# Patient Record
Sex: FEMALE | Race: WHITE | Employment: UNEMPLOYED
[De-identification: names, ages, dates, MRNs, and addresses within clinical notes are randomized per-mention and may not be internally consistent; named-entity substitution may affect disease eponyms.]

---

## 2017-01-17 RX ORDER — CYCLOBENZAPRINE HCL 10 MG
10 TABLET ORAL 3 TIMES DAILY PRN
COMMUNITY
End: 2020-01-16 | Stop reason: SDUPTHER

## 2017-01-17 RX ORDER — POLYETHYLENE GLYCOL 3350 17 G/17G
17 POWDER, FOR SOLUTION ORAL DAILY PRN
COMMUNITY
End: 2017-01-20

## 2017-01-17 RX ORDER — PANTOPRAZOLE SODIUM 40 MG/1
40 TABLET, DELAYED RELEASE ORAL DAILY
COMMUNITY
End: 2017-01-20 | Stop reason: SINTOL

## 2017-01-17 RX ORDER — CLONAZEPAM 0.5 MG/1
0.5 TABLET ORAL 2 TIMES DAILY PRN
COMMUNITY
End: 2017-01-20

## 2017-01-20 ENCOUNTER — OFFICE VISIT (OUTPATIENT)
Dept: GASTROENTEROLOGY | Facility: CLINIC | Age: 42
End: 2017-01-20

## 2017-01-20 VITALS
TEMPERATURE: 98.3 F | HEART RATE: 112 BPM | HEIGHT: 60 IN | OXYGEN SATURATION: 97 % | DIASTOLIC BLOOD PRESSURE: 90 MMHG | SYSTOLIC BLOOD PRESSURE: 130 MMHG | BODY MASS INDEX: 37.11 KG/M2 | WEIGHT: 189 LBS

## 2017-01-20 DIAGNOSIS — K59.04 CHRONIC IDIOPATHIC CONSTIPATION: ICD-10-CM

## 2017-01-20 DIAGNOSIS — K76.0 HEPATIC STEATOSIS: ICD-10-CM

## 2017-01-20 DIAGNOSIS — K29.50 CHRONIC GASTRITIS WITHOUT BLEEDING, UNSPECIFIED GASTRITIS TYPE: ICD-10-CM

## 2017-01-20 DIAGNOSIS — K76.9 LIVER LESION: ICD-10-CM

## 2017-01-20 DIAGNOSIS — R10.13 EPIGASTRIC PAIN: Primary | ICD-10-CM

## 2017-01-20 PROCEDURE — 99204 OFFICE O/P NEW MOD 45 MIN: CPT | Performed by: INTERNAL MEDICINE

## 2017-01-20 RX ORDER — OMEPRAZOLE 20 MG/1
20 TABLET, DELAYED RELEASE ORAL DAILY
Qty: 30 TABLET | Refills: 3 | Status: SHIPPED | OUTPATIENT
Start: 2017-01-20 | End: 2020-07-27 | Stop reason: ALTCHOICE

## 2017-01-20 RX ORDER — AMOXICILLIN 250 MG
2 CAPSULE ORAL 2 TIMES DAILY
Qty: 120 TABLET | Refills: 3 | Status: SHIPPED | OUTPATIENT
Start: 2017-01-20 | End: 2017-02-19

## 2017-01-20 ASSESSMENT — ENCOUNTER SYMPTOMS
CHOKING: 0
EYE ITCHING: 1
RECTAL PAIN: 0
COUGH: 1
SORE THROAT: 1
SINUS PRESSURE: 1
ANAL BLEEDING: 0
ABDOMINAL PAIN: 1
EYE REDNESS: 1
CONSTIPATION: 1
ABDOMINAL DISTENTION: 1
VOMITING: 1
SHORTNESS OF BREATH: 0
BLOOD IN STOOL: 0
NAUSEA: 1
DIARRHEA: 0
TROUBLE SWALLOWING: 1

## 2017-02-06 ENCOUNTER — HOSPITAL ENCOUNTER (OUTPATIENT)
Dept: CT IMAGING | Age: 42
Discharge: HOME | End: 2017-02-06
Payer: COMMERCIAL

## 2017-02-06 DIAGNOSIS — K76.9 LIVER LESION: ICD-10-CM

## 2017-02-06 PROCEDURE — 74170 CT ABD WO CNTRST FLWD CNTRST: CPT | Performed by: RADIOLOGY

## 2017-02-06 PROCEDURE — 74170 CT ABD WO CNTRST FLWD CNTRST: CPT

## 2017-02-06 PROCEDURE — 6360000004 HC RX CONTRAST MEDICATION: Performed by: INTERNAL MEDICINE

## 2017-02-06 RX ADMIN — IOVERSOL 100 ML: 741 INJECTION INTRA-ARTERIAL; INTRAVENOUS at 18:51

## 2017-02-08 ENCOUNTER — TELEPHONE (OUTPATIENT)
Dept: GASTROENTEROLOGY | Facility: CLINIC | Age: 42
End: 2017-02-08

## 2017-03-24 ENCOUNTER — HOSPITAL ENCOUNTER (EMERGENCY)
Age: 42
Discharge: HOME | End: 2017-03-24
Attending: EMERGENCY MEDICINE
Payer: COMMERCIAL

## 2017-03-24 VITALS
DIASTOLIC BLOOD PRESSURE: 90 MMHG | TEMPERATURE: 98.3 F | BODY MASS INDEX: 36.71 KG/M2 | OXYGEN SATURATION: 100 % | RESPIRATION RATE: 16 BRPM | WEIGHT: 187 LBS | HEART RATE: 93 BPM | HEIGHT: 60 IN | SYSTOLIC BLOOD PRESSURE: 140 MMHG

## 2017-03-24 DIAGNOSIS — M25.512 ACUTE PAIN OF LEFT SHOULDER: Primary | ICD-10-CM

## 2017-03-24 PROCEDURE — 6370000000 HC RX 637 (ALT 250 FOR IP): Performed by: EMERGENCY MEDICINE

## 2017-03-24 PROCEDURE — 99283 EMERGENCY DEPT VISIT LOW MDM: CPT

## 2017-03-24 RX ORDER — TIZANIDINE 4 MG/1
4 TABLET ORAL EVERY 6 HOURS PRN
COMMUNITY
End: 2018-04-18 | Stop reason: ALTCHOICE

## 2017-03-24 RX ORDER — ACETAMINOPHEN 500 MG
1000 TABLET ORAL ONCE
Status: COMPLETED | OUTPATIENT
Start: 2017-03-24 | End: 2017-03-24

## 2017-03-24 RX ORDER — DIAZEPAM 2 MG/1
2 TABLET ORAL EVERY 8 HOURS PRN
Qty: 10 TABLET | Refills: 0 | Status: SHIPPED | OUTPATIENT
Start: 2017-03-24 | End: 2017-04-03

## 2017-03-24 RX ORDER — AMOXICILLIN 250 MG
1 CAPSULE ORAL 2 TIMES DAILY
COMMUNITY
End: 2018-05-24 | Stop reason: SDUPTHER

## 2017-03-24 RX ORDER — FEXOFENADINE HCL 180 MG/1
180 TABLET ORAL DAILY
COMMUNITY
End: 2018-01-17 | Stop reason: ALTCHOICE

## 2017-03-24 RX ADMIN — ACETAMINOPHEN 1000 MG: 500 TABLET, FILM COATED ORAL at 18:53

## 2017-03-24 ASSESSMENT — PAIN SCALES - GENERAL
PAINLEVEL_OUTOF10: 8
PAINLEVEL_OUTOF10: 8

## 2017-03-24 ASSESSMENT — PAIN DESCRIPTION - LOCATION: LOCATION: SHOULDER

## 2017-03-24 ASSESSMENT — PAIN DESCRIPTION - ORIENTATION: ORIENTATION: LEFT

## 2017-03-24 ASSESSMENT — PAIN DESCRIPTION - DESCRIPTORS: DESCRIPTORS: SHARP

## 2017-04-17 ENCOUNTER — OFFICE VISIT (OUTPATIENT)
Dept: GASTROENTEROLOGY | Age: 42
End: 2017-04-17
Payer: COMMERCIAL

## 2017-04-17 VITALS
SYSTOLIC BLOOD PRESSURE: 134 MMHG | HEART RATE: 82 BPM | BODY MASS INDEX: 36.71 KG/M2 | DIASTOLIC BLOOD PRESSURE: 95 MMHG | HEIGHT: 60 IN | TEMPERATURE: 98.9 F | OXYGEN SATURATION: 100 % | WEIGHT: 187 LBS

## 2017-04-17 DIAGNOSIS — K59.00 CONSTIPATION, UNSPECIFIED CONSTIPATION TYPE: ICD-10-CM

## 2017-04-17 DIAGNOSIS — R10.13 EPIGASTRIC PAIN: ICD-10-CM

## 2017-04-17 DIAGNOSIS — K76.0 HEPATIC STEATOSIS: ICD-10-CM

## 2017-04-17 DIAGNOSIS — K76.9 LIVER LESION: Primary | ICD-10-CM

## 2017-04-17 PROCEDURE — 99213 OFFICE O/P EST LOW 20 MIN: CPT | Performed by: INTERNAL MEDICINE

## 2017-04-17 RX ORDER — SUCRALFATE 1 G/1
1 TABLET ORAL 4 TIMES DAILY
Qty: 120 TABLET | Refills: 3 | Status: SHIPPED | OUTPATIENT
Start: 2017-04-17 | End: 2017-07-07 | Stop reason: SDUPTHER

## 2017-04-17 ASSESSMENT — ENCOUNTER SYMPTOMS
NAUSEA: 1
CONSTIPATION: 1
ABDOMINAL DISTENTION: 1
DIARRHEA: 0
EYE ITCHING: 1
TROUBLE SWALLOWING: 1
RECTAL PAIN: 0
SHORTNESS OF BREATH: 0
ANAL BLEEDING: 0
CHOKING: 0
SORE THROAT: 1
EYE REDNESS: 1
SINUS PRESSURE: 1
BLOOD IN STOOL: 0
ABDOMINAL PAIN: 1
COUGH: 1
VOMITING: 0

## 2017-07-07 DIAGNOSIS — R10.13 EPIGASTRIC PAIN: ICD-10-CM

## 2017-07-12 RX ORDER — SUCRALFATE 1 G/1
TABLET ORAL
Qty: 120 TABLET | Refills: 0 | Status: SHIPPED | OUTPATIENT
Start: 2017-07-12 | End: 2017-08-28 | Stop reason: SDUPTHER

## 2017-08-28 DIAGNOSIS — R10.13 EPIGASTRIC PAIN: ICD-10-CM

## 2017-08-30 RX ORDER — SUCRALFATE 1 G/1
TABLET ORAL
Qty: 120 TABLET | Refills: 1 | Status: SHIPPED | OUTPATIENT
Start: 2017-08-30 | End: 2017-09-28 | Stop reason: SDUPTHER

## 2017-09-28 DIAGNOSIS — R10.13 EPIGASTRIC PAIN: ICD-10-CM

## 2017-09-29 RX ORDER — SUCRALFATE 1 G/1
TABLET ORAL
Qty: 120 TABLET | Refills: 0 | Status: SHIPPED | OUTPATIENT
Start: 2017-09-29 | End: 2018-01-17 | Stop reason: ALTCHOICE

## 2017-10-26 DIAGNOSIS — R10.13 EPIGASTRIC PAIN: ICD-10-CM

## 2017-10-30 RX ORDER — SUCRALFATE 1 G/1
TABLET ORAL
Qty: 120 TABLET | Refills: 0 | Status: SHIPPED | OUTPATIENT
Start: 2017-10-30 | End: 2017-11-27 | Stop reason: SDUPTHER

## 2017-11-27 DIAGNOSIS — R10.13 EPIGASTRIC PAIN: ICD-10-CM

## 2017-12-04 RX ORDER — SUCRALFATE 1 G/1
TABLET ORAL
Qty: 120 TABLET | Refills: 5 | Status: SHIPPED | OUTPATIENT
Start: 2017-12-04 | End: 2018-01-17 | Stop reason: ALTCHOICE

## 2017-12-20 DIAGNOSIS — K76.9 LIVER LESION: Primary | ICD-10-CM

## 2018-01-09 ENCOUNTER — HOSPITAL ENCOUNTER (OUTPATIENT)
Dept: CT IMAGING | Age: 43
Discharge: HOME | End: 2018-01-09
Payer: COMMERCIAL

## 2018-01-09 DIAGNOSIS — K76.9 LIVER LESION: ICD-10-CM

## 2018-01-09 LAB
BUN BLDV-MCNC: 10 MG/DL (ref 6–20)
CREAT SERPL-MCNC: 0.86 MG/DL (ref 0.5–0.9)
GFR AFRICAN AMERICAN: >60 ML/MIN
GFR NON-AFRICAN AMERICAN: >60 ML/MIN
GFR SERPL CREATININE-BSD FRML MDRD: NORMAL ML/MIN/{1.73_M2}
GFR SERPL CREATININE-BSD FRML MDRD: NORMAL ML/MIN/{1.73_M2}

## 2018-01-09 PROCEDURE — 2580000003 HC RX 258: Performed by: INTERNAL MEDICINE

## 2018-01-09 PROCEDURE — 82565 ASSAY OF CREATININE: CPT

## 2018-01-09 PROCEDURE — 36415 COLL VENOUS BLD VENIPUNCTURE: CPT

## 2018-01-09 PROCEDURE — 6360000004 HC RX CONTRAST MEDICATION: Performed by: INTERNAL MEDICINE

## 2018-01-09 PROCEDURE — 84520 ASSAY OF UREA NITROGEN: CPT

## 2018-01-09 PROCEDURE — 74170 CT ABD WO CNTRST FLWD CNTRST: CPT

## 2018-01-09 RX ORDER — SODIUM CHLORIDE 0.9 % (FLUSH) 0.9 %
10 SYRINGE (ML) INJECTION 2 TIMES DAILY
Status: DISCONTINUED | OUTPATIENT
Start: 2018-01-09 | End: 2018-01-12 | Stop reason: HOSPADM

## 2018-01-09 RX ORDER — 0.9 % SODIUM CHLORIDE 0.9 %
100 INTRAVENOUS SOLUTION INTRAVENOUS ONCE
Status: COMPLETED | OUTPATIENT
Start: 2018-01-09 | End: 2018-01-09

## 2018-01-09 RX ADMIN — IOPAMIDOL 100 ML: 755 INJECTION, SOLUTION INTRAVENOUS at 19:34

## 2018-01-09 RX ADMIN — SODIUM CHLORIDE 100 ML: 9 INJECTION, SOLUTION INTRAVENOUS at 19:33

## 2018-01-09 RX ADMIN — Medication 10 ML: at 19:34

## 2018-01-17 ENCOUNTER — OFFICE VISIT (OUTPATIENT)
Dept: GASTROENTEROLOGY | Age: 43
End: 2018-01-17
Payer: COMMERCIAL

## 2018-01-17 VITALS
BODY MASS INDEX: 37.5 KG/M2 | HEIGHT: 60 IN | HEART RATE: 95 BPM | SYSTOLIC BLOOD PRESSURE: 139 MMHG | OXYGEN SATURATION: 99 % | WEIGHT: 191 LBS | DIASTOLIC BLOOD PRESSURE: 99 MMHG

## 2018-01-17 DIAGNOSIS — K76.9 LIVER LESION: ICD-10-CM

## 2018-01-17 DIAGNOSIS — R10.13 EPIGASTRIC PAIN: Primary | ICD-10-CM

## 2018-01-17 DIAGNOSIS — K76.0 HEPATIC STEATOSIS: ICD-10-CM

## 2018-01-17 PROCEDURE — G8484 FLU IMMUNIZE NO ADMIN: HCPCS | Performed by: INTERNAL MEDICINE

## 2018-01-17 PROCEDURE — 99213 OFFICE O/P EST LOW 20 MIN: CPT | Performed by: INTERNAL MEDICINE

## 2018-01-17 PROCEDURE — G8427 DOCREV CUR MEDS BY ELIG CLIN: HCPCS | Performed by: INTERNAL MEDICINE

## 2018-01-17 PROCEDURE — 4004F PT TOBACCO SCREEN RCVD TLK: CPT | Performed by: INTERNAL MEDICINE

## 2018-01-17 PROCEDURE — G8417 CALC BMI ABV UP PARAM F/U: HCPCS | Performed by: INTERNAL MEDICINE

## 2018-01-17 ASSESSMENT — ENCOUNTER SYMPTOMS
CHOKING: 0
SINUS PRESSURE: 0
EYE REDNESS: 1
SORE THROAT: 0
TROUBLE SWALLOWING: 0
VOMITING: 0
ABDOMINAL PAIN: 1
DIARRHEA: 0
SHORTNESS OF BREATH: 0
RECTAL PAIN: 0
COUGH: 1
CONSTIPATION: 1
EYE ITCHING: 1
NAUSEA: 1
ANAL BLEEDING: 0
ABDOMINAL DISTENTION: 1
BLOOD IN STOOL: 0

## 2018-01-17 NOTE — PROGRESS NOTES
potential complications of fatty liver disease including cirrhosis, liver failure, and liver cancer. We also discussed extrahepatic complications of metabolic syndrome including cardiovascular events and malignancies. Follow up in 3 months. Marcus Winters MD [facility]      Please note that this chart was generated using voice recognition Dragon dictation software. Although every effort was made to ensure the accuracy of this automated transcription, some errors in transcription may have occurred.

## 2018-01-19 RX ORDER — NICOTINE POLACRILEX 4 MG/1
GUM, CHEWING ORAL
Qty: 90 TABLET | Refills: 2 | Status: SHIPPED | OUTPATIENT
Start: 2018-01-19 | End: 2018-05-24 | Stop reason: SDUPTHER

## 2018-01-22 RX ORDER — SENNA-LAX 8.6 MG/1
TABLET ORAL
Qty: 120 TABLET | Refills: 5 | Status: SHIPPED | OUTPATIENT
Start: 2018-01-22 | End: 2020-07-27

## 2018-02-05 ENCOUNTER — TELEPHONE (OUTPATIENT)
Dept: GASTROENTEROLOGY | Age: 43
End: 2018-02-05

## 2018-03-06 LAB
AVERAGE GLUCOSE: NORMAL
HBA1C MFR BLD: 7.4 %

## 2018-04-12 ENCOUNTER — HOSPITAL ENCOUNTER (OUTPATIENT)
Dept: DIABETES SERVICES | Age: 43
Setting detail: THERAPIES SERIES
Discharge: HOME | End: 2018-04-12
Payer: COMMERCIAL

## 2018-04-12 DIAGNOSIS — E11.00 TYPE 2 DIABETES MELLITUS WITH HYPEROSMOLARITY WITHOUT COMA, WITHOUT LONG-TERM CURRENT USE OF INSULIN (HCC): Primary | ICD-10-CM

## 2018-04-12 PROCEDURE — G0108 DIAB MANAGE TRN  PER INDIV: HCPCS

## 2018-04-12 RX ORDER — METFORMIN HYDROCHLORIDE 500 MG/1
500 TABLET, EXTENDED RELEASE ORAL
COMMUNITY
End: 2021-03-15 | Stop reason: SDUPTHER

## 2018-04-12 RX ORDER — FEXOFENADINE HCL 180 MG/1
60 TABLET ORAL
COMMUNITY
End: 2020-03-20 | Stop reason: SDUPTHER

## 2018-04-18 ENCOUNTER — OFFICE VISIT (OUTPATIENT)
Dept: GASTROENTEROLOGY | Age: 43
End: 2018-04-18
Payer: COMMERCIAL

## 2018-04-18 VITALS
OXYGEN SATURATION: 98 % | DIASTOLIC BLOOD PRESSURE: 87 MMHG | BODY MASS INDEX: 37.11 KG/M2 | WEIGHT: 189 LBS | SYSTOLIC BLOOD PRESSURE: 129 MMHG | HEIGHT: 60 IN | HEART RATE: 94 BPM

## 2018-04-18 DIAGNOSIS — K59.00 CONSTIPATION, UNSPECIFIED CONSTIPATION TYPE: ICD-10-CM

## 2018-04-18 DIAGNOSIS — R10.13 EPIGASTRIC PAIN: ICD-10-CM

## 2018-04-18 DIAGNOSIS — K76.0 HEPATIC STEATOSIS: Primary | ICD-10-CM

## 2018-04-18 PROCEDURE — 4004F PT TOBACCO SCREEN RCVD TLK: CPT | Performed by: INTERNAL MEDICINE

## 2018-04-18 PROCEDURE — G8417 CALC BMI ABV UP PARAM F/U: HCPCS | Performed by: INTERNAL MEDICINE

## 2018-04-18 PROCEDURE — G8427 DOCREV CUR MEDS BY ELIG CLIN: HCPCS | Performed by: INTERNAL MEDICINE

## 2018-04-18 PROCEDURE — 99213 OFFICE O/P EST LOW 20 MIN: CPT | Performed by: INTERNAL MEDICINE

## 2018-04-18 ASSESSMENT — ENCOUNTER SYMPTOMS
VOMITING: 0
ANAL BLEEDING: 0
CONSTIPATION: 1
NAUSEA: 1
ABDOMINAL PAIN: 0
SINUS PRESSURE: 0
SHORTNESS OF BREATH: 0
EYE REDNESS: 1
TROUBLE SWALLOWING: 0
RECTAL PAIN: 0
COUGH: 1
CHOKING: 0
DIARRHEA: 0
EYE ITCHING: 1
SORE THROAT: 0
BLOOD IN STOOL: 0
ABDOMINAL DISTENTION: 0

## 2018-04-23 ENCOUNTER — HOSPITAL ENCOUNTER (OUTPATIENT)
Age: 43
Discharge: HOME | End: 2018-04-23
Payer: COMMERCIAL

## 2018-04-23 DIAGNOSIS — R10.13 EPIGASTRIC PAIN: ICD-10-CM

## 2018-04-23 DIAGNOSIS — K76.0 HEPATIC STEATOSIS: ICD-10-CM

## 2018-04-23 DIAGNOSIS — K59.00 CONSTIPATION, UNSPECIFIED CONSTIPATION TYPE: ICD-10-CM

## 2018-04-23 LAB
ALBUMIN SERPL-MCNC: 3.9 G/DL (ref 3.5–5.2)
ALBUMIN SERPL-MCNC: 4 G/DL (ref 3.5–5.2)
ALBUMIN/GLOBULIN RATIO: ABNORMAL (ref 1–2.5)
ALBUMIN/GLOBULIN RATIO: ABNORMAL (ref 1–2.5)
ALP BLD-CCNC: 127 U/L (ref 35–104)
ALP BLD-CCNC: 128 U/L (ref 35–104)
ALT SERPL-CCNC: 116 U/L (ref 5–33)
ALT SERPL-CCNC: 117 U/L (ref 5–33)
ANION GAP SERPL CALCULATED.3IONS-SCNC: 12 MMOL/L (ref 9–17)
AST SERPL-CCNC: 55 U/L
AST SERPL-CCNC: 56 U/L
BILIRUB SERPL-MCNC: 0.56 MG/DL (ref 0.3–1.2)
BILIRUB SERPL-MCNC: 0.59 MG/DL (ref 0.3–1.2)
BILIRUBIN DIRECT: 0.14 MG/DL
BILIRUBIN, INDIRECT: 0.45 MG/DL (ref 0–1)
BUN BLDV-MCNC: 13 MG/DL (ref 6–20)
BUN/CREAT BLD: ABNORMAL (ref 9–20)
CALCIUM SERPL-MCNC: 8.7 MG/DL (ref 8.6–10.4)
CHLORIDE BLD-SCNC: 103 MMOL/L (ref 98–107)
CHOLESTEROL/HDL RATIO: 5.3
CHOLESTEROL: 190 MG/DL
CO2: 23 MMOL/L (ref 20–31)
CREAT SERPL-MCNC: 0.75 MG/DL (ref 0.5–0.9)
GFR AFRICAN AMERICAN: >60 ML/MIN
GFR NON-AFRICAN AMERICAN: >60 ML/MIN
GFR SERPL CREATININE-BSD FRML MDRD: ABNORMAL ML/MIN/{1.73_M2}
GFR SERPL CREATININE-BSD FRML MDRD: ABNORMAL ML/MIN/{1.73_M2}
GLOBULIN: ABNORMAL G/DL (ref 1.5–3.8)
GLUCOSE BLD-MCNC: 154 MG/DL (ref 70–99)
HDLC SERPL-MCNC: 36 MG/DL
LDL CHOLESTEROL: 131 MG/DL (ref 0–130)
POTASSIUM SERPL-SCNC: 3.6 MMOL/L (ref 3.7–5.3)
SODIUM BLD-SCNC: 138 MMOL/L (ref 135–144)
TOTAL PROTEIN: 6.9 G/DL (ref 6.4–8.3)
TOTAL PROTEIN: 7 G/DL (ref 6.4–8.3)
TRIGL SERPL-MCNC: 117 MG/DL
VLDLC SERPL CALC-MCNC: ABNORMAL MG/DL (ref 1–30)

## 2018-04-23 PROCEDURE — 80076 HEPATIC FUNCTION PANEL: CPT

## 2018-04-23 PROCEDURE — 80061 LIPID PANEL: CPT

## 2018-04-23 PROCEDURE — 80053 COMPREHEN METABOLIC PANEL: CPT

## 2018-04-23 PROCEDURE — 36415 COLL VENOUS BLD VENIPUNCTURE: CPT

## 2018-05-24 ENCOUNTER — OFFICE VISIT (OUTPATIENT)
Dept: FAMILY MEDICINE CLINIC | Age: 43
End: 2018-05-24
Payer: COMMERCIAL

## 2018-05-24 VITALS
OXYGEN SATURATION: 100 % | SYSTOLIC BLOOD PRESSURE: 134 MMHG | DIASTOLIC BLOOD PRESSURE: 88 MMHG | BODY MASS INDEX: 37.11 KG/M2 | HEIGHT: 60 IN | WEIGHT: 189 LBS | HEART RATE: 86 BPM

## 2018-05-24 DIAGNOSIS — K76.0 HEPATIC STEATOSIS: ICD-10-CM

## 2018-05-24 DIAGNOSIS — Z72.0 TOBACCO ABUSE: ICD-10-CM

## 2018-05-24 DIAGNOSIS — Z12.31 ENCOUNTER FOR SCREENING MAMMOGRAM FOR BREAST CANCER: ICD-10-CM

## 2018-05-24 DIAGNOSIS — K76.9 LIVER LESION: ICD-10-CM

## 2018-05-24 DIAGNOSIS — Z80.3 FH: BREAST CANCER IN FIRST DEGREE RELATIVE: ICD-10-CM

## 2018-05-24 DIAGNOSIS — E11.69 HYPERLIPIDEMIA ASSOCIATED WITH TYPE 2 DIABETES MELLITUS (HCC): Primary | ICD-10-CM

## 2018-05-24 DIAGNOSIS — K29.50 CHRONIC GASTRITIS WITHOUT BLEEDING, UNSPECIFIED GASTRITIS TYPE: ICD-10-CM

## 2018-05-24 DIAGNOSIS — R74.8 ELEVATED LIVER ENZYMES: ICD-10-CM

## 2018-05-24 DIAGNOSIS — E78.00 PURE HYPERCHOLESTEROLEMIA: ICD-10-CM

## 2018-05-24 DIAGNOSIS — M94.0 COSTOCHONDRITIS: ICD-10-CM

## 2018-05-24 DIAGNOSIS — E78.5 HYPERLIPIDEMIA ASSOCIATED WITH TYPE 2 DIABETES MELLITUS (HCC): Primary | ICD-10-CM

## 2018-05-24 DIAGNOSIS — Z00.00 PREVENTATIVE HEALTH CARE: ICD-10-CM

## 2018-05-24 DIAGNOSIS — Z13.1 ENCOUNTER FOR SCREENING FOR DIABETES MELLITUS: ICD-10-CM

## 2018-05-24 DIAGNOSIS — M79.7 PRIMARY FIBROMYALGIA SYNDROME: ICD-10-CM

## 2018-05-24 PROCEDURE — 99205 OFFICE O/P NEW HI 60 MIN: CPT | Performed by: NURSE PRACTITIONER

## 2018-05-24 PROCEDURE — 2022F DILAT RTA XM EVC RTNOPTHY: CPT | Performed by: NURSE PRACTITIONER

## 2018-05-24 PROCEDURE — 4004F PT TOBACCO SCREEN RCVD TLK: CPT | Performed by: NURSE PRACTITIONER

## 2018-05-24 PROCEDURE — G8417 CALC BMI ABV UP PARAM F/U: HCPCS | Performed by: NURSE PRACTITIONER

## 2018-05-24 PROCEDURE — 3046F HEMOGLOBIN A1C LEVEL >9.0%: CPT | Performed by: NURSE PRACTITIONER

## 2018-05-24 PROCEDURE — G8427 DOCREV CUR MEDS BY ELIG CLIN: HCPCS | Performed by: NURSE PRACTITIONER

## 2018-05-24 RX ORDER — FLUTICASONE PROPIONATE 50 MCG
SPRAY, SUSPENSION (ML) NASAL
COMMUNITY
Start: 2018-04-17 | End: 2020-07-27 | Stop reason: SDUPTHER

## 2018-05-24 RX ORDER — ATORVASTATIN CALCIUM 20 MG/1
TABLET, FILM COATED ORAL
COMMUNITY
Start: 2018-05-07 | End: 2020-01-16 | Stop reason: SDUPTHER

## 2018-05-24 RX ORDER — EPINEPHRINE 0.3 MG/.3ML
INJECTION SUBCUTANEOUS
COMMUNITY
Start: 2018-05-15 | End: 2020-01-16 | Stop reason: SDUPTHER

## 2018-05-24 RX ORDER — CHLORHEXIDINE GLUCONATE 0.12 MG/ML
RINSE ORAL
COMMUNITY
Start: 2018-02-20 | End: 2022-01-31 | Stop reason: SDUPTHER

## 2018-05-24 ASSESSMENT — PATIENT HEALTH QUESTIONNAIRE - PHQ9
SUM OF ALL RESPONSES TO PHQ QUESTIONS 1-9: 2
2. FEELING DOWN, DEPRESSED OR HOPELESS: 1
SUM OF ALL RESPONSES TO PHQ9 QUESTIONS 1 & 2: 2
1. LITTLE INTEREST OR PLEASURE IN DOING THINGS: 1

## 2018-05-24 ASSESSMENT — ENCOUNTER SYMPTOMS
ABDOMINAL PAIN: 0
EYE DISCHARGE: 0
COUGH: 0
SHORTNESS OF BREATH: 1
BLOOD IN STOOL: 0

## 2018-06-11 ENCOUNTER — HOSPITAL ENCOUNTER (OUTPATIENT)
Dept: DIABETES SERVICES | Age: 43
Setting detail: THERAPIES SERIES
Discharge: HOME | End: 2018-06-11
Payer: COMMERCIAL

## 2018-06-11 DIAGNOSIS — E11.00 TYPE 2 DIABETES MELLITUS WITH HYPEROSMOLARITY WITHOUT COMA, WITHOUT LONG-TERM CURRENT USE OF INSULIN (HCC): Primary | ICD-10-CM

## 2018-06-11 LAB
AVERAGE GLUCOSE: NORMAL
HBA1C MFR BLD: 6.9 %

## 2018-06-11 PROCEDURE — G0108 DIAB MANAGE TRN  PER INDIV: HCPCS

## 2018-07-03 ENCOUNTER — HOSPITAL ENCOUNTER (OUTPATIENT)
Dept: DIABETES SERVICES | Age: 43
Setting detail: THERAPIES SERIES
Discharge: HOME | End: 2018-07-03
Payer: COMMERCIAL

## 2018-07-03 PROCEDURE — G0108 DIAB MANAGE TRN  PER INDIV: HCPCS

## 2018-07-03 NOTE — PROGRESS NOTES
metformin ER started 4/12/18    Restarted 6/9 metformin and tolerating now - 6/11/18    Insulin / Injectable - Appropriate injection sites; proper storage; supplies needed; proper technique; safe needle disposal guidelines. 1       Monitoring blood glucose, interpreting and using results:  Identify recommended & personal blood glucose targets; importance of testing; testing supplies; HgbA1C target levels; Factors affecting blood glucose; Importance of logging blood glucose levels for pattern recognition; ketone testing; safe lancet disposal.   1 6/11/18RS   Now using BG meter 1 - 3 times day     BG - still needs get back on track - with daily checks - also stated she has A1C done this am now at PCP -  6.8%  6/11/18rs   Prevention, detection & treatment of acute complications:  Identify symptoms of hyper & hypoglycemia, and prevention & treatment strategies. 1    More low BG     Stated random times with lows  - will use pop and small amt of candy and follow up  With meal  - 6/11/18   Describe sick day guidelines & indications for  physician notification. Identify short term consequences of poor control. 1       Prevention, detection & treatment of chronic complications:  Define the natural course of diabetes & describe the relationship of blood glucose levels to long term complications of diabetes. Identify preventative measures & standards of care. 1       Developing strategies to address psychosocial issues:  Describe feelings about living with diabetes; Describe how stress, depression & anxiety affect blood glucose; Identify coping strategies; Identify support needed & support network available. 1 6/11/18RS   Very anxious and feels like her health is very bad    Developing strategies to promote health/change behavior: Identify 7 self-care behaviors; Personal health risk factors; Benefits, challenges & strategies for behavioral change;     6/11/18RS        Individualized goal selection.               My

## 2018-07-09 ENCOUNTER — OFFICE VISIT (OUTPATIENT)
Dept: FAMILY MEDICINE CLINIC | Age: 43
End: 2018-07-09
Payer: COMMERCIAL

## 2018-07-09 VITALS
WEIGHT: 186.2 LBS | DIASTOLIC BLOOD PRESSURE: 78 MMHG | OXYGEN SATURATION: 99 % | SYSTOLIC BLOOD PRESSURE: 134 MMHG | BODY MASS INDEX: 36.56 KG/M2 | HEIGHT: 60 IN | TEMPERATURE: 98.1 F | HEART RATE: 84 BPM

## 2018-07-09 DIAGNOSIS — Z12.31 ENCOUNTER FOR SCREENING MAMMOGRAM FOR BREAST CANCER: ICD-10-CM

## 2018-07-09 DIAGNOSIS — E11.69 HYPERLIPIDEMIA ASSOCIATED WITH TYPE 2 DIABETES MELLITUS (HCC): ICD-10-CM

## 2018-07-09 DIAGNOSIS — E78.5 HYPERLIPIDEMIA ASSOCIATED WITH TYPE 2 DIABETES MELLITUS (HCC): ICD-10-CM

## 2018-07-09 DIAGNOSIS — J02.9 ACUTE PHARYNGITIS, UNSPECIFIED ETIOLOGY: Primary | ICD-10-CM

## 2018-07-09 DIAGNOSIS — J02.9 SORE THROAT: ICD-10-CM

## 2018-07-09 LAB — S PYO AG THROAT QL: NORMAL

## 2018-07-09 PROCEDURE — G8427 DOCREV CUR MEDS BY ELIG CLIN: HCPCS | Performed by: NURSE PRACTITIONER

## 2018-07-09 PROCEDURE — 4004F PT TOBACCO SCREEN RCVD TLK: CPT | Performed by: NURSE PRACTITIONER

## 2018-07-09 PROCEDURE — 3045F PR MOST RECENT HEMOGLOBIN A1C LEVEL 7.0-9.0%: CPT | Performed by: NURSE PRACTITIONER

## 2018-07-09 PROCEDURE — 2022F DILAT RTA XM EVC RTNOPTHY: CPT | Performed by: NURSE PRACTITIONER

## 2018-07-09 PROCEDURE — G8417 CALC BMI ABV UP PARAM F/U: HCPCS | Performed by: NURSE PRACTITIONER

## 2018-07-09 PROCEDURE — 87880 STREP A ASSAY W/OPTIC: CPT | Performed by: NURSE PRACTITIONER

## 2018-07-09 PROCEDURE — 99213 OFFICE O/P EST LOW 20 MIN: CPT | Performed by: NURSE PRACTITIONER

## 2018-07-09 RX ORDER — AZITHROMYCIN 250 MG/1
TABLET, FILM COATED ORAL
Qty: 1 PACKET | Refills: 0 | Status: SHIPPED | OUTPATIENT
Start: 2018-07-09 | End: 2018-07-13

## 2018-07-09 RX ORDER — CALCIUM CITRATE/VITAMIN D3 200MG-6.25
TABLET ORAL
Refills: 3 | COMMUNITY
Start: 2018-06-27 | End: 2021-08-16

## 2018-07-09 ASSESSMENT — ENCOUNTER SYMPTOMS
SORE THROAT: 1
SINUS PRESSURE: 1
RHINORRHEA: 1
COUGH: 1

## 2018-07-09 NOTE — PROGRESS NOTES
Visit Information    Have you changed or started any medications since your last visit including any over-the-counter medicines, vitamins, or herbal medicines? no   Are you having any side effects from any of your medications? -  no  Have you stopped taking any of your medications? Is so, why? -  no    Have you seen any other physician or provider since your last visit? Yes - Records Obtained  Have you had any other diagnostic tests since your last visit? Yes - Records Obtained  Have you been seen in the emergency room and/or had an admission to a hospital since we last saw you? No  Have you had your routine dental cleaning in the past 6 months? no    Have you activated your [employer] account? If not, what are your barriers?  Yes     Patient Care Team:  DAV Snow - CNP as PCP - General (Certified Nurse Practitioner)  Andrea Loaiza MD as Consulting Physician (Gastroenterology)    Medical History Review  Past Medical, Family, and Social History reviewed and does contribute to the patient presenting condition    Health Maintenance   Topic Date Due    Diabetic foot exam  06/08/1986    A1C test (Diabetic or Prediabetic)  06/08/1986    Diabetic retinal exam  06/08/1986    HIV screen  06/08/1991    Diabetic microalbuminuria test  06/08/1994    DTaP/Tdap/Td vaccine (1 - Tdap) 06/08/1995    Pneumococcal med risk (1 of 1 - PPSV23) 06/08/1995    Cervical cancer screen  06/08/1997    Breast cancer screen  06/08/2016    Flu vaccine (1) 09/01/2018    Lipid screen  04/23/2019
Hyperlipidemia associated with type 2 diabetes mellitus ([facility]         Plan:       Diagnosis Orders   1. Acute pharyngitis, unspecified etiology     2. Sore throat  POCT rapid strep A   3. Encounter for screening mammogram for breast cancer     4. Hyperlipidemia associated with type 2 diabetes mellitus ([facility]         Amanda Woods received counseling on the following healthy behaviors: medication adherence  Reviewed prior labs and health maintenance. Continue current medications, diet and exercise. Discussed use, benefit, and side effects of prescribed medications. Barriers to medication compliance addressed. Patient given educational materials - see patient instructions. All patient questions answered. Patient voiced understanding. Return in about 2 weeks (around 7/23/2018) for Diabetes foot exam , 1/2 hr appt . Orders Placed This Encounter   Procedures    POCT rapid strep A     Orders Placed This Encounter   Medications    azithromycin (ZITHROMAX Z-MANUELA) 250 MG tablet     Sig: Take 2 tablets (500 mg) on Day 1, and then take 1 tablet (250 mg) on days 2 through 5. Dispense:  1 packet     Refill:  0       Patient given verbal and/or written educational instructions. Follow up as directed. I have reviewed and agree with the nursing documentation.     Electronically signed by DAV Porras CNP on 7/9/2018 at 4:13 PM

## 2018-07-23 ENCOUNTER — OFFICE VISIT (OUTPATIENT)
Dept: FAMILY MEDICINE CLINIC | Age: 43
End: 2018-07-23
Payer: COMMERCIAL

## 2018-07-23 VITALS
TEMPERATURE: 97.8 F | BODY MASS INDEX: 36.6 KG/M2 | DIASTOLIC BLOOD PRESSURE: 96 MMHG | OXYGEN SATURATION: 100 % | WEIGHT: 186.4 LBS | SYSTOLIC BLOOD PRESSURE: 132 MMHG | HEIGHT: 60 IN | HEART RATE: 94 BPM

## 2018-07-23 DIAGNOSIS — Z00.00 PREVENTATIVE HEALTH CARE: ICD-10-CM

## 2018-07-23 DIAGNOSIS — Z72.0 TOBACCO ABUSE: ICD-10-CM

## 2018-07-23 DIAGNOSIS — E11.69 HYPERLIPIDEMIA ASSOCIATED WITH TYPE 2 DIABETES MELLITUS (HCC): ICD-10-CM

## 2018-07-23 DIAGNOSIS — K76.9 LIVER LESION: ICD-10-CM

## 2018-07-23 DIAGNOSIS — E78.5 HYPERLIPIDEMIA ASSOCIATED WITH TYPE 2 DIABETES MELLITUS (HCC): ICD-10-CM

## 2018-07-23 DIAGNOSIS — Z80.3 FH: BREAST CANCER IN FIRST DEGREE RELATIVE: ICD-10-CM

## 2018-07-23 DIAGNOSIS — M79.7 PRIMARY FIBROMYALGIA SYNDROME: ICD-10-CM

## 2018-07-23 DIAGNOSIS — R03.0 ELEVATED BLOOD PRESSURE READING: Primary | ICD-10-CM

## 2018-07-23 PROCEDURE — G8417 CALC BMI ABV UP PARAM F/U: HCPCS | Performed by: NURSE PRACTITIONER

## 2018-07-23 PROCEDURE — G8427 DOCREV CUR MEDS BY ELIG CLIN: HCPCS | Performed by: NURSE PRACTITIONER

## 2018-07-23 PROCEDURE — 99214 OFFICE O/P EST MOD 30 MIN: CPT | Performed by: NURSE PRACTITIONER

## 2018-07-23 PROCEDURE — 4004F PT TOBACCO SCREEN RCVD TLK: CPT | Performed by: NURSE PRACTITIONER

## 2018-07-23 PROCEDURE — 3045F PR MOST RECENT HEMOGLOBIN A1C LEVEL 7.0-9.0%: CPT | Performed by: NURSE PRACTITIONER

## 2018-07-23 PROCEDURE — 2022F DILAT RTA XM EVC RTNOPTHY: CPT | Performed by: NURSE PRACTITIONER

## 2018-07-23 ASSESSMENT — ENCOUNTER SYMPTOMS
BLOOD IN STOOL: 0
COUGH: 0
ABDOMINAL PAIN: 0
EYE DISCHARGE: 0
SHORTNESS OF BREATH: 0

## 2018-08-16 RX ORDER — NICOTINE POLACRILEX 4 MG/1
GUM, CHEWING ORAL
Qty: 90 TABLET | Refills: 1 | Status: SHIPPED | OUTPATIENT
Start: 2018-08-16 | End: 2020-01-16 | Stop reason: SDUPTHER

## 2018-09-06 ENCOUNTER — HOSPITAL ENCOUNTER (OUTPATIENT)
Age: 43
Discharge: HOME | End: 2018-09-06
Payer: COMMERCIAL

## 2018-09-06 LAB
CREATININE URINE: 94.3 MG/DL (ref 28–217)
MICROALBUMIN/CREAT 24H UR: <12 MG/L
MICROALBUMIN/CREAT UR-RTO: NORMAL MCG/MG CREAT

## 2018-09-06 PROCEDURE — 82570 ASSAY OF URINE CREATININE: CPT

## 2018-09-06 PROCEDURE — 82043 UR ALBUMIN QUANTITATIVE: CPT

## 2019-01-28 ENCOUNTER — HOSPITAL ENCOUNTER (EMERGENCY)
Age: 44
Discharge: HOME | End: 2019-01-29
Attending: EMERGENCY MEDICINE
Payer: COMMERCIAL

## 2019-01-28 ENCOUNTER — APPOINTMENT (OUTPATIENT)
Dept: GENERAL RADIOLOGY | Age: 44
End: 2019-01-28
Payer: COMMERCIAL

## 2019-01-28 DIAGNOSIS — R07.9 CHEST PAIN, UNSPECIFIED TYPE: Primary | ICD-10-CM

## 2019-01-28 LAB
ABSOLUTE EOS #: 0.4 K/UL (ref 0–0.4)
ABSOLUTE IMMATURE GRANULOCYTE: NORMAL K/UL (ref 0–0.3)
ABSOLUTE LYMPH #: 3.7 K/UL (ref 1–4.8)
ABSOLUTE MONO #: 0.6 K/UL (ref 0.1–1.3)
ALBUMIN SERPL-MCNC: 4 G/DL (ref 3.5–5.2)
ALBUMIN/GLOBULIN RATIO: ABNORMAL (ref 1–2.5)
ALP BLD-CCNC: 139 U/L (ref 35–104)
ALT SERPL-CCNC: 93 U/L (ref 5–33)
ANION GAP SERPL CALCULATED.3IONS-SCNC: 13 MMOL/L (ref 9–17)
AST SERPL-CCNC: 47 U/L
BASOPHILS # BLD: 1 % (ref 0–2)
BASOPHILS ABSOLUTE: 0.1 K/UL (ref 0–0.2)
BILIRUB SERPL-MCNC: 0.38 MG/DL (ref 0.3–1.2)
BNP INTERPRETATION: NORMAL
BUN BLDV-MCNC: 12 MG/DL (ref 6–20)
BUN/CREAT BLD: ABNORMAL (ref 9–20)
CALCIUM SERPL-MCNC: 9.8 MG/DL (ref 8.6–10.4)
CHLORIDE BLD-SCNC: 100 MMOL/L (ref 98–107)
CO2: 25 MMOL/L (ref 20–31)
CREAT SERPL-MCNC: 0.74 MG/DL (ref 0.5–0.9)
D-DIMER QUANTITATIVE: <0.27 MG/L FEU (ref 0–0.59)
DIFFERENTIAL TYPE: NORMAL
EKG ATRIAL RATE: 93 BPM
EKG P AXIS: 60 DEGREES
EKG P-R INTERVAL: 158 MS
EKG Q-T INTERVAL: 368 MS
EKG QRS DURATION: 92 MS
EKG QTC CALCULATION (BAZETT): 457 MS
EKG R AXIS: 29 DEGREES
EKG T AXIS: 59 DEGREES
EKG VENTRICULAR RATE: 93 BPM
EOSINOPHILS RELATIVE PERCENT: 4 % (ref 0–4)
GFR AFRICAN AMERICAN: >60 ML/MIN
GFR NON-AFRICAN AMERICAN: >60 ML/MIN
GFR SERPL CREATININE-BSD FRML MDRD: ABNORMAL ML/MIN/{1.73_M2}
GFR SERPL CREATININE-BSD FRML MDRD: ABNORMAL ML/MIN/{1.73_M2}
GLUCOSE BLD-MCNC: 231 MG/DL (ref 70–99)
HCG QUALITATIVE: NEGATIVE
HCT VFR BLD CALC: 42.3 % (ref 36–46)
HEMOGLOBIN: 14.5 G/DL (ref 12–16)
IMMATURE GRANULOCYTES: NORMAL %
INR BLD: 1.1
LYMPHOCYTES # BLD: 38 % (ref 24–44)
MCH RBC QN AUTO: 30.7 PG (ref 26–34)
MCHC RBC AUTO-ENTMCNC: 34.4 G/DL (ref 31–37)
MCV RBC AUTO: 89.1 FL (ref 80–100)
MONOCYTES # BLD: 7 % (ref 1–7)
NRBC AUTOMATED: NORMAL PER 100 WBC
PARTIAL THROMBOPLASTIN TIME: 29.3 SEC (ref 24–36)
PDW BLD-RTO: 13.8 % (ref 11.5–14.9)
PLATELET # BLD: 222 K/UL (ref 150–450)
PLATELET ESTIMATE: NORMAL
PMV BLD AUTO: 11.4 FL (ref 6–12)
POTASSIUM SERPL-SCNC: 4 MMOL/L (ref 3.7–5.3)
PRO-BNP: <20 PG/ML
PROTHROMBIN TIME: 14.2 SEC (ref 11.8–14.6)
RBC # BLD: 4.74 M/UL (ref 4–5.2)
RBC # BLD: NORMAL 10*6/UL
SEG NEUTROPHILS: 50 % (ref 36–66)
SEGMENTED NEUTROPHILS ABSOLUTE COUNT: 4.9 K/UL (ref 1.3–9.1)
SODIUM BLD-SCNC: 138 MMOL/L (ref 135–144)
TOTAL PROTEIN: 7.4 G/DL (ref 6.4–8.3)
TROPONIN INTERP: NORMAL
TROPONIN T: NORMAL NG/ML
TROPONIN, HIGH SENSITIVITY: <6 NG/L (ref 0–14)
WBC # BLD: 9.8 K/UL (ref 3.5–11)
WBC # BLD: NORMAL 10*3/UL

## 2019-01-28 PROCEDURE — 99285 EMERGENCY DEPT VISIT HI MDM: CPT

## 2019-01-28 PROCEDURE — 84484 ASSAY OF TROPONIN QUANT: CPT

## 2019-01-28 PROCEDURE — 85610 PROTHROMBIN TIME: CPT

## 2019-01-28 PROCEDURE — 85379 FIBRIN DEGRADATION QUANT: CPT

## 2019-01-28 PROCEDURE — 71045 X-RAY EXAM CHEST 1 VIEW: CPT

## 2019-01-28 PROCEDURE — 80053 COMPREHEN METABOLIC PANEL: CPT

## 2019-01-28 PROCEDURE — 36415 COLL VENOUS BLD VENIPUNCTURE: CPT

## 2019-01-28 PROCEDURE — 84703 CHORIONIC GONADOTROPIN ASSAY: CPT

## 2019-01-28 PROCEDURE — 83880 ASSAY OF NATRIURETIC PEPTIDE: CPT

## 2019-01-28 PROCEDURE — 85025 COMPLETE CBC W/AUTO DIFF WBC: CPT

## 2019-01-28 PROCEDURE — 85730 THROMBOPLASTIN TIME PARTIAL: CPT

## 2019-01-28 PROCEDURE — 6370000000 HC RX 637 (ALT 250 FOR IP): Performed by: EMERGENCY MEDICINE

## 2019-01-28 RX ORDER — IBUPROFEN 200 MG
400 TABLET ORAL ONCE
Status: COMPLETED | OUTPATIENT
Start: 2019-01-28 | End: 2019-01-28

## 2019-01-28 RX ADMIN — IBUPROFEN 400 MG: 200 TABLET, FILM COATED ORAL at 23:33

## 2019-01-28 ASSESSMENT — PAIN SCALES - GENERAL
PAINLEVEL_OUTOF10: 6
PAINLEVEL_OUTOF10: 10

## 2019-01-28 ASSESSMENT — PAIN DESCRIPTION - LOCATION: LOCATION: CHEST

## 2019-01-29 VITALS
SYSTOLIC BLOOD PRESSURE: 117 MMHG | HEIGHT: 60 IN | BODY MASS INDEX: 36.32 KG/M2 | RESPIRATION RATE: 22 BRPM | OXYGEN SATURATION: 95 % | HEART RATE: 86 BPM | DIASTOLIC BLOOD PRESSURE: 76 MMHG | WEIGHT: 185 LBS

## 2019-01-29 LAB
TROPONIN INTERP: NORMAL
TROPONIN T: NORMAL NG/ML
TROPONIN, HIGH SENSITIVITY: <6 NG/L (ref 0–14)

## 2019-01-29 PROCEDURE — 93005 ELECTROCARDIOGRAM TRACING: CPT

## 2019-01-29 RX ORDER — IBUPROFEN 600 MG/1
600 TABLET ORAL 4 TIMES DAILY PRN
Qty: 20 TABLET | Refills: 0 | Status: SHIPPED | OUTPATIENT
Start: 2019-01-29 | End: 2020-01-16 | Stop reason: ALTCHOICE

## 2019-01-29 ASSESSMENT — PAIN SCALES - GENERAL: PAINLEVEL_OUTOF10: 2

## 2019-10-08 ENCOUNTER — HOSPITAL ENCOUNTER (OUTPATIENT)
Age: 44
Discharge: HOME | End: 2019-10-08
Payer: COMMERCIAL

## 2019-10-08 LAB
ABSOLUTE EOS #: 0.3 K/UL (ref 0–0.4)
ABSOLUTE IMMATURE GRANULOCYTE: NORMAL K/UL (ref 0–0.3)
ABSOLUTE LYMPH #: 2.8 K/UL (ref 1–4.8)
ABSOLUTE MONO #: 0.5 K/UL (ref 0.1–1.3)
ALBUMIN SERPL-MCNC: 4.3 G/DL (ref 3.5–5.2)
ALBUMIN/GLOBULIN RATIO: ABNORMAL (ref 1–2.5)
ALP BLD-CCNC: 123 U/L (ref 35–104)
ALT SERPL-CCNC: 86 U/L (ref 5–33)
ANION GAP SERPL CALCULATED.3IONS-SCNC: 10 MMOL/L (ref 9–17)
AST SERPL-CCNC: 54 U/L
BASOPHILS # BLD: 1 % (ref 0–2)
BASOPHILS ABSOLUTE: 0.1 K/UL (ref 0–0.2)
BILIRUB SERPL-MCNC: 0.32 MG/DL (ref 0.3–1.2)
BUN BLDV-MCNC: 8 MG/DL (ref 6–20)
BUN/CREAT BLD: ABNORMAL (ref 9–20)
CALCIUM SERPL-MCNC: 9.8 MG/DL (ref 8.6–10.4)
CHLORIDE BLD-SCNC: 101 MMOL/L (ref 98–107)
CHOLESTEROL/HDL RATIO: 4.8
CHOLESTEROL: 193 MG/DL
CO2: 24 MMOL/L (ref 20–31)
CREAT SERPL-MCNC: 0.69 MG/DL (ref 0.5–0.9)
CREATININE URINE: 69.3 MG/DL (ref 28–217)
DIFFERENTIAL TYPE: NORMAL
EOSINOPHILS RELATIVE PERCENT: 3 % (ref 0–4)
GFR AFRICAN AMERICAN: >60 ML/MIN
GFR NON-AFRICAN AMERICAN: >60 ML/MIN
GFR SERPL CREATININE-BSD FRML MDRD: ABNORMAL ML/MIN/{1.73_M2}
GFR SERPL CREATININE-BSD FRML MDRD: ABNORMAL ML/MIN/{1.73_M2}
GLUCOSE BLD-MCNC: 231 MG/DL (ref 70–99)
HCT VFR BLD CALC: 43.3 % (ref 36–46)
HDLC SERPL-MCNC: 40 MG/DL
HEMOGLOBIN: 14.7 G/DL (ref 12–16)
IMMATURE GRANULOCYTES: NORMAL %
LDL CHOLESTEROL: 120 MG/DL (ref 0–130)
LYMPHOCYTES # BLD: 30 % (ref 24–44)
MCH RBC QN AUTO: 30.5 PG (ref 26–34)
MCHC RBC AUTO-ENTMCNC: 34 G/DL (ref 31–37)
MCV RBC AUTO: 89.7 FL (ref 80–100)
MICROALBUMIN/CREAT 24H UR: <12 MG/L
MICROALBUMIN/CREAT UR-RTO: NORMAL MCG/MG CREAT
MONOCYTES # BLD: 5 % (ref 1–7)
NRBC AUTOMATED: NORMAL PER 100 WBC
PDW BLD-RTO: 14.1 % (ref 11.5–14.9)
PLATELET # BLD: 201 K/UL (ref 150–450)
PLATELET ESTIMATE: NORMAL
PMV BLD AUTO: 10.5 FL (ref 6–12)
POTASSIUM SERPL-SCNC: 4.3 MMOL/L (ref 3.7–5.3)
RBC # BLD: 4.83 M/UL (ref 4–5.2)
RBC # BLD: NORMAL 10*6/UL
SEG NEUTROPHILS: 61 % (ref 36–66)
SEGMENTED NEUTROPHILS ABSOLUTE COUNT: 5.6 K/UL (ref 1.3–9.1)
SODIUM BLD-SCNC: 135 MMOL/L (ref 135–144)
TOTAL PROTEIN: 7.8 G/DL (ref 6.4–8.3)
TRIGL SERPL-MCNC: 165 MG/DL
TSH SERPL DL<=0.05 MIU/L-ACNC: 2.24 MIU/L (ref 0.3–5)
VITAMIN B-12: 540 PG/ML (ref 232–1245)
VITAMIN D 25-HYDROXY: 25.9 NG/ML (ref 30–100)
VLDLC SERPL CALC-MCNC: ABNORMAL MG/DL (ref 1–30)
WBC # BLD: 9.2 K/UL (ref 3.5–11)
WBC # BLD: NORMAL 10*3/UL

## 2019-10-08 PROCEDURE — 85025 COMPLETE CBC W/AUTO DIFF WBC: CPT

## 2019-10-08 PROCEDURE — 36415 COLL VENOUS BLD VENIPUNCTURE: CPT

## 2019-10-08 PROCEDURE — 82570 ASSAY OF URINE CREATININE: CPT

## 2019-10-08 PROCEDURE — 80061 LIPID PANEL: CPT

## 2019-10-08 PROCEDURE — 82607 VITAMIN B-12: CPT

## 2019-10-08 PROCEDURE — 82043 UR ALBUMIN QUANTITATIVE: CPT

## 2019-10-08 PROCEDURE — 82306 VITAMIN D 25 HYDROXY: CPT

## 2019-10-08 PROCEDURE — 80053 COMPREHEN METABOLIC PANEL: CPT

## 2019-10-08 PROCEDURE — 84443 ASSAY THYROID STIM HORMONE: CPT

## 2020-01-16 ENCOUNTER — OFFICE VISIT (OUTPATIENT)
Dept: FAMILY MEDICINE CLINIC | Age: 45
End: 2020-01-16
Payer: COMMERCIAL

## 2020-01-16 ENCOUNTER — OFFICE VISIT (OUTPATIENT)
Dept: PSYCHIATRY | Age: 45
End: 2020-01-16
Payer: COMMERCIAL

## 2020-01-16 VITALS
HEIGHT: 60 IN | TEMPERATURE: 97 F | SYSTOLIC BLOOD PRESSURE: 128 MMHG | BODY MASS INDEX: 36.4 KG/M2 | HEART RATE: 89 BPM | WEIGHT: 185.4 LBS | DIASTOLIC BLOOD PRESSURE: 82 MMHG | OXYGEN SATURATION: 98 %

## 2020-01-16 VITALS
HEART RATE: 88 BPM | OXYGEN SATURATION: 96 % | DIASTOLIC BLOOD PRESSURE: 74 MMHG | HEIGHT: 60 IN | SYSTOLIC BLOOD PRESSURE: 134 MMHG | WEIGHT: 185.8 LBS | BODY MASS INDEX: 36.48 KG/M2

## 2020-01-16 PROBLEM — E78.5 HYPERLIPIDEMIA: Status: ACTIVE | Noted: 2020-01-16

## 2020-01-16 PROBLEM — E11.9 TYPE 2 DIABETES MELLITUS WITHOUT COMPLICATION, WITHOUT LONG-TERM CURRENT USE OF INSULIN (HCC): Status: ACTIVE | Noted: 2020-01-16

## 2020-01-16 PROBLEM — F41.9 ANXIETY: Status: ACTIVE | Noted: 2020-01-16

## 2020-01-16 PROBLEM — M94.0 COSTOCHONDRITIS: Status: RESOLVED | Noted: 2018-05-24 | Resolved: 2020-01-16

## 2020-01-16 PROBLEM — K59.00 CONSTIPATION: Status: RESOLVED | Noted: 2017-04-17 | Resolved: 2020-01-16

## 2020-01-16 PROCEDURE — 4004F PT TOBACCO SCREEN RCVD TLK: CPT | Performed by: FAMILY MEDICINE

## 2020-01-16 PROCEDURE — G8484 FLU IMMUNIZE NO ADMIN: HCPCS | Performed by: FAMILY MEDICINE

## 2020-01-16 PROCEDURE — G8427 DOCREV CUR MEDS BY ELIG CLIN: HCPCS | Performed by: FAMILY MEDICINE

## 2020-01-16 PROCEDURE — 99213 OFFICE O/P EST LOW 20 MIN: CPT | Performed by: FAMILY MEDICINE

## 2020-01-16 PROCEDURE — 2022F DILAT RTA XM EVC RTNOPTHY: CPT | Performed by: FAMILY MEDICINE

## 2020-01-16 PROCEDURE — 4004F PT TOBACCO SCREEN RCVD TLK: CPT | Performed by: NURSE PRACTITIONER

## 2020-01-16 PROCEDURE — 90792 PSYCH DIAG EVAL W/MED SRVCS: CPT | Performed by: NURSE PRACTITIONER

## 2020-01-16 PROCEDURE — 3046F HEMOGLOBIN A1C LEVEL >9.0%: CPT | Performed by: FAMILY MEDICINE

## 2020-01-16 PROCEDURE — G8417 CALC BMI ABV UP PARAM F/U: HCPCS | Performed by: FAMILY MEDICINE

## 2020-01-16 RX ORDER — HYDROXYZINE HYDROCHLORIDE 10 MG/1
TABLET, FILM COATED ORAL
COMMUNITY
End: 2020-01-16 | Stop reason: ALTCHOICE

## 2020-01-16 RX ORDER — NAPROXEN 500 MG/1
TABLET ORAL
COMMUNITY
End: 2020-01-16 | Stop reason: ALTCHOICE

## 2020-01-16 RX ORDER — PSEUDOEPHEDRINE HCL 30 MG/1
TABLET ORAL
COMMUNITY
Start: 2020-01-07 | End: 2021-06-14 | Stop reason: ALTCHOICE

## 2020-01-16 RX ORDER — NORTRIPTYLINE HYDROCHLORIDE 10 MG/1
CAPSULE ORAL
COMMUNITY
End: 2020-01-16 | Stop reason: SDUPTHER

## 2020-01-16 RX ORDER — TRAZODONE HYDROCHLORIDE 100 MG/1
TABLET ORAL
COMMUNITY
End: 2020-01-16 | Stop reason: ALTCHOICE

## 2020-01-16 RX ORDER — ERGOCALCIFEROL 1.25 MG/1
CAPSULE ORAL
COMMUNITY
Start: 2020-01-06 | End: 2021-03-13 | Stop reason: ALTCHOICE

## 2020-01-16 RX ORDER — ASPIRIN 81 MG/1
TABLET, DELAYED RELEASE ORAL
COMMUNITY
Start: 2020-01-07 | End: 2020-05-14

## 2020-01-16 RX ORDER — METRONIDAZOLE 500 MG/1
TABLET ORAL
COMMUNITY
End: 2020-01-16 | Stop reason: ALTCHOICE

## 2020-01-16 RX ORDER — PRAZOSIN HYDROCHLORIDE 1 MG/1
1 CAPSULE ORAL NIGHTLY
Qty: 30 CAPSULE | Refills: 3 | Status: SHIPPED | OUTPATIENT
Start: 2020-01-16 | End: 2020-05-18

## 2020-01-16 RX ORDER — GLUCOSAM/CHON-MSM1/C/MANG/BOSW 500-416.6
TABLET ORAL
COMMUNITY
Start: 2020-01-14 | End: 2022-06-01 | Stop reason: ALTCHOICE

## 2020-01-16 RX ORDER — LEVOFLOXACIN 500 MG/1
TABLET, FILM COATED ORAL
COMMUNITY
Start: 2019-12-09 | End: 2020-01-16 | Stop reason: ALTCHOICE

## 2020-01-16 RX ORDER — ATORVASTATIN CALCIUM 40 MG/1
TABLET, FILM COATED ORAL
COMMUNITY
Start: 2020-01-14 | End: 2020-01-16 | Stop reason: SDUPTHER

## 2020-01-16 RX ORDER — BUSPIRONE HYDROCHLORIDE 5 MG/1
5 TABLET ORAL
COMMUNITY
End: 2020-01-16 | Stop reason: ALTCHOICE

## 2020-01-16 RX ORDER — DOXYCYCLINE HYCLATE 100 MG/1
CAPSULE ORAL
COMMUNITY
Start: 2019-11-11 | End: 2020-01-16 | Stop reason: ALTCHOICE

## 2020-01-16 RX ORDER — TRAZODONE HYDROCHLORIDE 50 MG/1
TABLET ORAL
COMMUNITY
End: 2020-01-16 | Stop reason: ALTCHOICE

## 2020-01-16 RX ORDER — ATORVASTATIN CALCIUM 20 MG/1
20 TABLET, FILM COATED ORAL DAILY
Qty: 30 TABLET | Refills: 3 | Status: SHIPPED | OUTPATIENT
Start: 2020-01-16 | End: 2020-01-16 | Stop reason: SDUPTHER

## 2020-01-16 RX ORDER — CEFDINIR 300 MG/1
CAPSULE ORAL
COMMUNITY
Start: 2020-01-07 | End: 2020-01-16 | Stop reason: SINTOL

## 2020-01-16 RX ORDER — TRAMADOL HYDROCHLORIDE 50 MG/1
TABLET ORAL
COMMUNITY
End: 2020-01-16 | Stop reason: ALTCHOICE

## 2020-01-16 RX ORDER — SUCRALFATE 1 G/1
TABLET ORAL
COMMUNITY
End: 2020-01-16 | Stop reason: ALTCHOICE

## 2020-01-16 RX ORDER — QUETIAPINE FUMARATE 25 MG/1
25 TABLET, FILM COATED ORAL NIGHTLY
Qty: 30 TABLET | Refills: 0 | Status: SHIPPED | OUTPATIENT
Start: 2020-01-16 | End: 2020-01-24

## 2020-01-16 RX ORDER — EPINEPHRINE 0.3 MG/.3ML
INJECTION SUBCUTANEOUS
Qty: 2 EACH | Refills: 0 | Status: SHIPPED | OUTPATIENT
Start: 2020-01-16 | End: 2021-06-14 | Stop reason: SDUPTHER

## 2020-01-16 RX ORDER — HYDROXYZINE HYDROCHLORIDE 25 MG/1
25 TABLET, FILM COATED ORAL 3 TIMES DAILY PRN
Qty: 90 TABLET | Refills: 0 | Status: SHIPPED | OUTPATIENT
Start: 2020-01-16 | End: 2020-02-15

## 2020-01-16 RX ORDER — SERTRALINE HYDROCHLORIDE 25 MG/1
TABLET, FILM COATED ORAL
COMMUNITY
End: 2020-01-16

## 2020-01-16 RX ORDER — BENZONATATE 100 MG/1
CAPSULE ORAL
COMMUNITY
End: 2020-01-16 | Stop reason: ALTCHOICE

## 2020-01-16 RX ORDER — ATORVASTATIN CALCIUM 20 MG/1
20 TABLET, FILM COATED ORAL DAILY
Qty: 30 TABLET | Refills: 3 | Status: SHIPPED | OUTPATIENT
Start: 2020-01-16 | End: 2020-05-14

## 2020-01-16 RX ORDER — KETOTIFEN FUMARATE 0.35 MG/ML
SOLUTION/ DROPS OPHTHALMIC
COMMUNITY
End: 2020-05-14 | Stop reason: ALTCHOICE

## 2020-01-16 RX ORDER — OMEPRAZOLE 20 MG/1
CAPSULE, DELAYED RELEASE ORAL
COMMUNITY
Start: 2020-01-06 | End: 2021-06-14 | Stop reason: SDUPTHER

## 2020-01-16 RX ORDER — PREDNISONE 20 MG/1
TABLET ORAL
COMMUNITY
End: 2020-01-16 | Stop reason: ALTCHOICE

## 2020-01-16 RX ORDER — TRIAMCINOLONE ACETONIDE 5 MG/G
CREAM TOPICAL
COMMUNITY
End: 2020-05-14

## 2020-01-16 RX ORDER — ORPHENADRINE CITRATE 100 MG/1
TABLET, EXTENDED RELEASE ORAL
COMMUNITY
End: 2020-01-16 | Stop reason: ALTCHOICE

## 2020-01-16 RX ORDER — ALPRAZOLAM 0.25 MG/1
0.25 TABLET ORAL
COMMUNITY
End: 2020-01-24

## 2020-01-16 RX ORDER — ONDANSETRON 4 MG/1
TABLET, ORALLY DISINTEGRATING ORAL
COMMUNITY
End: 2020-01-16 | Stop reason: ALTCHOICE

## 2020-01-16 RX ORDER — TETRACYCLINE HYDROCHLORIDE 500 MG/1
CAPSULE ORAL
COMMUNITY
End: 2020-07-27

## 2020-01-16 RX ORDER — TIZANIDINE 2 MG/1
TABLET ORAL
COMMUNITY
End: 2020-05-14

## 2020-01-16 RX ORDER — CHOLECALCIFEROL (VITAMIN D3) 125 MCG
CAPSULE ORAL
COMMUNITY
End: 2020-04-08

## 2020-01-16 RX ORDER — CHLORAL HYDRATE 500 MG
CAPSULE ORAL
COMMUNITY
Start: 2020-01-06 | End: 2020-05-14

## 2020-01-16 ASSESSMENT — ENCOUNTER SYMPTOMS
ABDOMINAL PAIN: 0
SORE THROAT: 0
COUGH: 0
SHORTNESS OF BREATH: 0
NAUSEA: 0

## 2020-01-16 NOTE — PROGRESS NOTES
Behavioral Health Consultation  Stevan Arndt, MSN, APRN-CNP, PMHNP-BC  1/16/2020, 3:50 PM      Time spent with Patient:  60 minutes  This was not a telehealth visit. Chief Complaint:Anxiety  Referring Provider:Dr. Min Ruiz:  Patient complains of a multi-year history of anxiety. She reports anhedonia, depressed mood, tearfulness, feelings of hopelessness, feelings of worthlessness/excessive guilt, insomnia, fatigue, changes in appetite/weight, decreased libido, difficulty concentrating, irritability, excessive worry, panic attacks, obsessive thoughts, compulsive behaviors, suicidal thoughts or behavior and impaired memory. She reports difficulty falling asleep, difficulty staying asleep, restless unsatisfying sleep and early morning waking on most nights of the week. Pt reports taking xanax. Pt Symptoms/signs of dario: none. She denies hallucinations. Symptoms have been rapidly worsening with time. External stressors: family issues. Pt reports excessive worry or anxiety, difficulty controlling the worry, restlessness; feeling keyed up or on edge, easily fatigued, difficulty concentrating, irritability, muscle tension, sleep disturbance:  difficulty falling asleep, difficulty staying asleep, restless unsatisfying sleep and early morning waking, for at least 6 months, anxiety/worry about a number of events/activities, causing significant distress in important areas of function, is not due to physiological effects of substances or medical condition and does not occur exclusively during a mood disorder or psychotic disorder.  Patient exposed to traumatic event - actual or threatened death, serious injury, or sexual violence, intrusive symotoms:  images, thoughts, or perceptions of the event, dreams of the event, dissociative flashbacks, intense psychological distress when reminded of the event and intense physiological reactivity when reminded of the event, avoidance symptoms: avoids thoughts, was [id number]. Family Mental Health history:   Pertinent family history: depression and alcoholism. Mother is depressed, she is alcoholic- gotten violent with someone. MSE:    Appearance: alert, cooperative  Appetite: abnormal: eating a lot lately. Ambulation: gait normal.  Sleep disturbance: Yes  Loss of pleasure: Yes  Speech: spontaneous, normal rate, normal volume and well articulated  Mood: Anxious  Depressed  Affect: depressed affect  Thought Content: intact, hopelessness and helplessness  Insight: Fair  Judgment: Intact  Memory: Intact long-term and Intact short-term  Suicide Assessment: no suicidal ideation  Homicide Assessment: denies current homicidal ideation, plan and intent      History:      Review of Systems:   Constitutional: negative  HENT: positive for parotidectomy, adenoids removed, tubes in ears. Eyes: positive for eyeglasses, dry eye,   Respiratory: positive for smoking history  Cardiovascular: positive for great saphenous vein removed, on cholesterol meds. Gastrointestinal: positive for acid reflux, fatty liver, gastritis. Genitourinary: negative  Musculoskeletal: positive for fibromyalgia,   Skin:positive for itchiness,   Neurological:negative  Endo/Heme/Allergies:positive for dm, hx of anemia, lots of food allergies, 6 drug allergies.          Current Outpatient Medications:     tiZANidine (ZANAFLEX) 2 MG tablet, tizanidine 2 mg tablet, Disp: , Rfl:     omeprazole (PRILOSEC) 20 MG delayed release capsule, TAKE 1 CAPSULE BY MOUTH ONCE DAILY AS NEEDED, Disp: , Rfl:     Omega-3 Fatty Acids (FISH OIL) 1000 MG CAPS, TAKE 1 (ONE) CAPSULE TWO TIMES DAILY, Disp: , Rfl:     ketotifen (ZADITOR) 0.025 % ophthalmic solution, ketotifen 0.025 % (0.035 %) eye drops, Disp: , Rfl:     TRUEPLUS LANCETS 28G MISC, 1 (ONE) MISC MISC TWO TIMES DAILY, Disp: , Rfl:     SM MUCUS RELIEF MAX STRENGTH 1200 MG TB12, TAKE 1 (ONE) TABLET EVERY TWELVE HOURS, Disp: , Rfl:     vitamin D (ERGOCALCIFEROL) 1.25 MG (61077 UT) CAPS capsule, 1 (ONE) CAPSULE ONCE A WEEK, Disp: , Rfl:     ALPRAZolam (XANAX) 0.25 MG tablet, Take 0.25 mg by mouth., Disp: , Rfl:     EPINEPHrine (EPIPEN) 0.3 MG/0.3ML SOAJ injection, Use as directed, Disp: 2 each, Rfl: 0    atorvastatin (LIPITOR) 20 MG tablet, Take 1 tablet by mouth daily, Disp: 30 tablet, Rfl: 3    prazosin (MINIPRESS) 1 MG capsule, Take 1 capsule by mouth nightly, Disp: 30 capsule, Rfl: 3    QUEtiapine (SEROQUEL) 25 MG tablet, Take 1 tablet by mouth nightly, Disp: 30 tablet, Rfl: 0    hydrOXYzine (ATARAX) 25 MG tablet, Take 1 tablet by mouth 3 times daily as needed for Anxiety, Disp: 90 tablet, Rfl: 0    TRUE METRIX BLOOD GLUCOSE TEST strip, PATIENT CHECKS SUGAR 2-3 TIMES DAILY AND PRN, Disp: , Rfl: 3    chlorhexidine (PERIDEX) 0.12 % solution, , Disp: , Rfl:     fluticasone (FLONASE) 50 MCG/ACT nasal spray, , Disp: , Rfl:     fexofenadine (ALLEGRA) 180 MG tablet, Take 60 mg by mouth , Disp: , Rfl:     metFORMIN (GLUCOPHAGE-XR) 500 MG extended release tablet, Take 500 mg by mouth, Disp: , Rfl:     triamcinolone (ARISTOCORT) 0.5 % cream, triamcinolone acetonide 0.5 % topical cream, Disp: , Rfl:     melatonin 5 MG TABS tablet, melatonin 5 mg tablet, Disp: , Rfl:     SM ASPIRIN ADULT LOW STRENGTH 81 MG EC tablet, TAKE 1 TABLET BY MOUTH DAILY, Disp: , Rfl:     tetracycline (ACHROMYCIN;SUMYCIN) 500 MG capsule, tetracycline 500 mg capsule, Disp: , Rfl:     SENNA LAX 8.6 MG tablet, TAKE TWO TABLET BY MOUTH TWICE A DAY (Patient not taking: Reported on 1/16/2020), Disp: 120 tablet, Rfl: 5    omeprazole (PRILOSEC OTC) 20 MG tablet, Take 1 tablet by mouth daily (Patient not taking: Reported on 1/16/2020), Disp: 30 tablet, Rfl: 3     PDMP Monitoring:    Last PDMP [place] as Reviewed [facility]):  Review User Review Instant Review Result            Urine Drug Screenings (1 yr)     No resulted procedures found.         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 Family History   Problem Relation Age of Onset    Breast Cancer Mother         age 27    High Blood Pressure Mother     Diabetes Mother     High Blood Pressure Maternal Grandfather     Diabetes Maternal Grandfather        Last Labs:   Lab Results   Component Value Date    LABA1C 6.9 06/11/2018     No results found for: EAG   Lab Results   Component Value Date    WBC 9.2 10/08/2019    HGB 14.7 10/08/2019    HCT 43.3 10/08/2019    MCV 89.7 10/08/2019     10/08/2019    LYMPHOPCT 30 10/08/2019    RBC 4.83 10/08/2019    MCH 30.5 10/08/2019    MCHC 34.0 10/08/2019    RDW 14.1 10/08/2019          Lab Results   Component Value Date     10/08/2019    K 4.3 10/08/2019     10/08/2019    CO2 24 10/08/2019    BUN 8 10/08/2019    CREATININE 0.69 10/08/2019    GLUCOSE 231 (H) 10/08/2019    CALCIUM 9.8 10/08/2019    PROT 7.8 10/08/2019    LABALBU 4.3 10/08/2019    BILITOT 0.32 10/08/2019    ALKPHOS 123 (H) 10/08/2019    AST 54 (H) 10/08/2019    ALT 86 (H) 10/08/2019    LABGLOM >60 10/08/2019    GFRAA >60 10/08/2019    GLOB NOT REPORTED 04/23/2018      . last      Diagnosis:      1. PTSD (post-traumatic stress disorder)    2. Moderate episode of recurrent major depressive disorder ([facility]    3. Anxiety disorder, unspecified type        Plan:    Discussed ordering a genesight. Discussed risks and benefits of medications, as well as need for yearly lab work. Follow up with Jaylen Acosta for therapy. Greater than 50% was spent coordinating care, and therapy.     No orders of the defined types were placed in this encounter.      Orders Placed This Encounter   Medications    prazosin (MINIPRESS) 1 MG capsule     Sig: Take 1 capsule by mouth nightly     Dispense:  30 capsule     Refill:  3    QUEtiapine (SEROQUEL) 25 MG tablet     Sig: Take 1 tablet by mouth nightly     Dispense:  30 tablet     Refill:  0    hydrOXYzine (ATARAX) 25 MG tablet     Sig: Take 1 tablet by mouth 3 times daily as needed for Anxiety     Dispense:  90 tablet     Refill:  0       Pt interventions:    Discussed importance of medication adherence, Discussed risks, benefits, side effects of medication and need for follow up treatment and Discussed benefits of referral for specialty care

## 2020-01-16 NOTE — PROGRESS NOTES
Subjective:      Patient ID: Alejandro Turner is a 38 y.o. female. Visit Information    Have you changed or started any medications since your last visit including any over-the-counter medicines, vitamins, or herbal medicines? no   Are you having any side effects from any of your medications? -  no  Have you stopped taking any of your medications? Is so, why? -  no    Have you seen any other physician or provider since your last visit? No  Have you had any other diagnostic tests since your last visit? No  Have you been seen in the emergency room and/or had an admission to a hospital since we last saw you? No  Have you had your routine dental cleaning in the past 6 months? no    Have you activated your [employer] account? If not, what are your barriers? Yes     Patient Care Team:  Sarah Driver MD as PCP - General (Family Medicine)  Jd Houston MD as Consulting Physician (Gastroenterology)    Medical History Review  Past Medical, Family, and Social History reviewed and does not contribute to the patient presenting condition    Health Maintenance   Topic Date Due    Pneumococcal 0-64 years Vaccine (1 of 1 - PPSV23) 06/08/1982    Diabetic foot exam  06/08/1986    Diabetic retinal exam  06/08/1986    DTaP/Tdap/Td vaccine (1 - Tdap) 06/08/1987    HIV screen  06/08/1991    Hepatitis B vaccine (1 of 3 - Risk 3-dose series) 06/08/1995    Cervical cancer screen  06/08/1997    Breast cancer screen  06/08/2016    A1C test (Diabetic or Prediabetic)  06/11/2019    Flu vaccine (1) 09/01/2019    Diabetic microalbuminuria test  10/08/2020    Lipid screen  10/08/2020     HPI  41-year-old female is seen in the office first time by me has history of diabetes is on metformin and she sees an endocrinologist blood sugars are running between  160-2 00s.   Is having a lot of problems with anxiety will refer her to psychiatrist  has hyperlipidemia on Lipitor  Review of Systems   Constitutional: Negative for appetite change unspecified hyperlipidemia type               Plan:         Orders Placed This Encounter   Procedures    External Referral To Psychiatry     Referral Priority:   Routine     Referral Type:   Eval and Treat     Referral Reason:   Specialty Services Required     Referred to Provider:   DAV Soares NP     Requested Specialty:   Nurse Practitioner     Number of Visits Requested:   1     Orders Placed This Encounter   Medications    EPINEPHrine (EPIPEN) 0.3 MG/0.3ML SOAJ injection     Sig: Use as directed     Dispense:  2 each     Refill:  0    DISCONTD: atorvastatin (LIPITOR) 20 MG tablet     Sig: Take 1 tablet by mouth daily     Dispense:  30 tablet     Refill:  3    atorvastatin (LIPITOR) 20 MG tablet     Sig: Take 1 tablet by mouth daily     Dispense:  30 tablet     Refill:  3     Return in about 3 months (around 4/16/2020) for hyperlipdemia.     Continue current medications reviewed from the [facility]

## 2020-01-23 ENCOUNTER — OFFICE VISIT (OUTPATIENT)
Dept: PSYCHOLOGY | Age: 45
End: 2020-01-23
Payer: COMMERCIAL

## 2020-01-23 PROCEDURE — 90791 PSYCH DIAGNOSTIC EVALUATION: CPT | Performed by: SOCIAL WORKER

## 2020-01-23 ASSESSMENT — PATIENT HEALTH QUESTIONNAIRE - PHQ9
7. TROUBLE CONCENTRATING ON THINGS, SUCH AS READING THE NEWSPAPER OR WATCHING TELEVISION: 2
8. MOVING OR SPEAKING SO SLOWLY THAT OTHER PEOPLE COULD HAVE NOTICED. OR THE OPPOSITE, BEING SO FIGETY OR RESTLESS THAT YOU HAVE BEEN MOVING AROUND A LOT MORE THAN USUAL: 3
6. FEELING BAD ABOUT YOURSELF - OR THAT YOU ARE A FAILURE OR HAVE LET YOURSELF OR YOUR FAMILY DOWN: 3
5. POOR APPETITE OR OVEREATING: 1
SUM OF ALL RESPONSES TO PHQ QUESTIONS 1-9: 21
2. FEELING DOWN, DEPRESSED OR HOPELESS: 3
1. LITTLE INTEREST OR PLEASURE IN DOING THINGS: 3
9. THOUGHTS THAT YOU WOULD BE BETTER OFF DEAD, OR OF HURTING YOURSELF: 0
3. TROUBLE FALLING OR STAYING ASLEEP: 3
SUM OF ALL RESPONSES TO PHQ9 QUESTIONS 1 & 2: 6
SUM OF ALL RESPONSES TO PHQ QUESTIONS 1-9: 21
4. FEELING TIRED OR HAVING LITTLE ENERGY: 3

## 2020-01-23 ASSESSMENT — ANXIETY QUESTIONNAIRES
6. BECOMING EASILY ANNOYED OR IRRITABLE: 3-NEARLY EVERY DAY
3. WORRYING TOO MUCH ABOUT DIFFERENT THINGS: 3-NEARLY EVERY DAY
5. BEING SO RESTLESS THAT IT IS HARD TO SIT STILL: 3-NEARLY EVERY DAY
4. TROUBLE RELAXING: 3-NEARLY EVERY DAY
2. NOT BEING ABLE TO STOP OR CONTROL WORRYING: 3-NEARLY EVERY DAY
GAD7 TOTAL SCORE: 21
1. FEELING NERVOUS, ANXIOUS, OR ON EDGE: 3-NEARLY EVERY DAY
7. FEELING AFRAID AS IF SOMETHING AWFUL MIGHT HAPPEN: 3-NEARLY EVERY DAY
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
## 2020-01-24 ENCOUNTER — OFFICE VISIT (OUTPATIENT)
Dept: PSYCHIATRY | Age: 45
End: 2020-01-24
Payer: COMMERCIAL

## 2020-01-24 ENCOUNTER — HOSPITAL ENCOUNTER (EMERGENCY)
Age: 45
Discharge: HOME | End: 2020-01-24
Attending: EMERGENCY MEDICINE
Payer: COMMERCIAL

## 2020-01-24 ENCOUNTER — APPOINTMENT (OUTPATIENT)
Dept: GENERAL RADIOLOGY | Age: 45
End: 2020-01-24
Payer: COMMERCIAL

## 2020-01-24 VITALS
HEIGHT: 60 IN | HEART RATE: 89 BPM | WEIGHT: 185 LBS | OXYGEN SATURATION: 99 % | SYSTOLIC BLOOD PRESSURE: 139 MMHG | TEMPERATURE: 98.9 F | BODY MASS INDEX: 36.32 KG/M2 | DIASTOLIC BLOOD PRESSURE: 84 MMHG | RESPIRATION RATE: 18 BRPM

## 2020-01-24 VITALS — OXYGEN SATURATION: 98 % | SYSTOLIC BLOOD PRESSURE: 132 MMHG | DIASTOLIC BLOOD PRESSURE: 84 MMHG | HEART RATE: 95 BPM

## 2020-01-24 PROCEDURE — 73030 X-RAY EXAM OF SHOULDER: CPT

## 2020-01-24 PROCEDURE — G8427 DOCREV CUR MEDS BY ELIG CLIN: HCPCS | Performed by: NURSE PRACTITIONER

## 2020-01-24 PROCEDURE — 93005 ELECTROCARDIOGRAM TRACING: CPT | Performed by: EMERGENCY MEDICINE

## 2020-01-24 PROCEDURE — 6370000000 HC RX 637 (ALT 250 FOR IP): Performed by: EMERGENCY MEDICINE

## 2020-01-24 PROCEDURE — G8484 FLU IMMUNIZE NO ADMIN: HCPCS | Performed by: NURSE PRACTITIONER

## 2020-01-24 PROCEDURE — G8417 CALC BMI ABV UP PARAM F/U: HCPCS | Performed by: NURSE PRACTITIONER

## 2020-01-24 PROCEDURE — 99283 EMERGENCY DEPT VISIT LOW MDM: CPT

## 2020-01-24 PROCEDURE — 99212 OFFICE O/P EST SF 10 MIN: CPT | Performed by: NURSE PRACTITIONER

## 2020-01-24 RX ORDER — DESVENLAFAXINE 25 MG/1
TABLET, EXTENDED RELEASE ORAL
Qty: 42 TABLET | Refills: 0 | Status: SHIPPED | OUTPATIENT
Start: 2020-01-24 | End: 2020-04-08

## 2020-01-24 RX ORDER — LIDOCAINE 4 G/G
1 PATCH TOPICAL DAILY
Status: DISCONTINUED | OUTPATIENT
Start: 2020-01-24 | End: 2020-01-24 | Stop reason: HOSPADM

## 2020-01-24 RX ORDER — LIDOCAINE 50 MG/G
1 PATCH TOPICAL DAILY
Qty: 30 PATCH | Refills: 0 | Status: SHIPPED | OUTPATIENT
Start: 2020-01-24 | End: 2021-10-29

## 2020-01-24 ASSESSMENT — ENCOUNTER SYMPTOMS: BACK PAIN: 0

## 2020-01-24 ASSESSMENT — PAIN DESCRIPTION - ORIENTATION: ORIENTATION: LEFT

## 2020-01-24 ASSESSMENT — PAIN SCALES - GENERAL: PAINLEVEL_OUTOF10: 9

## 2020-01-24 ASSESSMENT — PAIN DESCRIPTION - DESCRIPTORS: DESCRIPTORS: SHARP

## 2020-01-24 ASSESSMENT — PAIN DESCRIPTION - FREQUENCY: FREQUENCY: CONTINUOUS

## 2020-01-24 ASSESSMENT — PAIN DESCRIPTION - LOCATION: LOCATION: SHOULDER

## 2020-01-24 ASSESSMENT — PAIN DESCRIPTION - PAIN TYPE: TYPE: ACUTE PAIN

## 2020-01-24 NOTE — PROGRESS NOTES
Documentation      Office Visit from 1/16/2020 in [address]   Periodic Controlled Substance Monitoring  No signs of potential drug abuse or diversion identified. filed at 01/16/2020 1538        Urine Drug Screenings (1 yr)     No resulted procedures found. Medication Contract and Consent for Opioid Use Documents Filed      No documents found                 [place]AR[place] checked and there were no signs of substance abuse, or prescription misuse.          Social History     Socioeconomic History    Marital status:      Spouse name: Not on file    Number of children: Not on file    Years of education: Not on file    Highest education level: Not on file   Occupational History    Not on file   Social Needs    Financial resource strain: Not on file    Food insecurity:     Worry: Not on file     Inability: Not on file    Transportation needs:     Medical: Not on file     Non-medical: Not on file   Tobacco Use    Smoking status: Current Every Day Smoker     Packs/day: 0.50     Years: 27.00     Pack years: 13.50     Types: Cigarettes    Smokeless tobacco: Never Used   Substance and Sexual Activity    Alcohol use: No    Drug use: No    Sexual activity: Not on file   Lifestyle    Physical activity:     Days per week: Not on file     Minutes per session: Not on file    Stress: Not on file   Relationships    Social connections:     Talks on phone: Not on file     Gets together: Not on file     Attends Anabaptist service: Not on file     Active member of club or organization: Not on file     Attends meetings of clubs or organizations: Not on file     Relationship status: Not on file    Intimate partner violence:     Fear of current or ex partner: Not on file     Emotionally abused: Not on file     Physically abused: Not on file     Forced sexual activity: Not on file   Other Topics Concern    Not on file   Social History Narrative    Not on file       TOBACCO: Irene Vargas  reports that she has been smoking cigarettes. She has a 13.50 pack-year smoking history. She has never used smokeless tobacco.  ETOH: Montse Willis  reports no history of alcohol use. 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 Family History   Problem Relation Age of Onset    Breast Cancer Mother         age 27    High Blood Pressure Mother     Diabetes Mother     High Blood Pressure Maternal Grandfather     Diabetes Maternal Grandfather      Last Labs:   Lab Results   Component Value Date    LABA1C 6.9 06/11/2018     No results found for: EAG   Lab Results   Component Value Date    WBC 9.2 10/08/2019    HGB 14.7 10/08/2019    HCT 43.3 10/08/2019    MCV 89.7 10/08/2019     10/08/2019    LYMPHOPCT 30 10/08/2019    RBC 4.83 10/08/2019    MCH 30.5 10/08/2019    MCHC 34.0 10/08/2019    RDW 14.1 10/08/2019          Lab Results   Component Value Date     10/08/2019    K 4.3 10/08/2019     10/08/2019    CO2 24 10/08/2019    BUN 8 10/08/2019    CREATININE 0.69 10/08/2019    GLUCOSE 231 (H) 10/08/2019    CALCIUM 9.8 10/08/2019    PROT 7.8 10/08/2019    LABALBU 4.3 10/08/2019    BILITOT 0.32 10/08/2019    ALKPHOS 123 (H) 10/08/2019    AST 54 (H) 10/08/2019    ALT 86 (H) 10/08/2019    LABGLOM >60 10/08/2019    GFRAA >60 10/08/2019    GLOB NOT REPORTED 04/23/2018      . last    Diagnosis:      1. PTSD (post-traumatic stress disorder)    2.  Moderate episode of recurrent major depressive disorder ([facility] [address]      Plan:    Discussed  Starting desvenlafaxine and titraing slowly. Discussed risks and benefits of medications, as well as need for yearly lab work. Follow up with Marvin Vásquez for continued therapy. Greater than 50% was spent coordinating care, and therapy. Continue work with PCP to manage medical concerns, and [facility] [place] for continued follow-up. No orders of the defined types were placed in this encounter. No orders of the defined types were placed in this encounter.       Pt interventions:    Discussed importance of medication adherence, Discussed risks, benefits, side effects of medication and need for follow up treatment, Discussed benefits of referral for specialty care and Discussed self-care (sleep, nutrition, rewarding activities, social support, exercise)

## 2020-01-24 NOTE — ED PROVIDER NOTES
EMERGENCY DEPARTMENT ENCOUNTER    Pt Name: Stiven Velázquez  MRN: [id number]  [place] 1975  Date of evaluation: 1/24/20  CHIEF COMPLAINT       Chief Complaint   Patient presents with    Shoulder Pain     HISTORY OF PRESENT ILLNESS     Shoulder Problem   Location:  Shoulder  Shoulder location:  L shoulder  Injury: no    Pain details:     Quality:  Aching    Radiates to:  Does not radiate    Severity:  Moderate    Onset quality:  Gradual    Duration:  2 days    Timing:  Constant    Progression:  Worsening  Handedness:  Ambidextrous  Dislocation: no    Prior injury to area:  No  Relieved by:  Nothing  Worsened by: Movement  Ineffective treatments: tried massage,   Associated symptoms: no back pain, no decreased range of motion, no fatigue, no fever, no muscle weakness, no neck pain, no numbness, no stiffness, no swelling and no tingling    hx of right shoulder surgery  Told she will likely need left shoulder also  Has a ride here    REVIEW OF SYSTEMS     Review of Systems   Constitutional: Negative for fatigue and fever. Musculoskeletal: Negative for back pain, neck pain and stiffness. All other systems reviewed and are negative.     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

## 2020-01-26 LAB
EKG ATRIAL RATE: 80 BPM
EKG P AXIS: 65 DEGREES
EKG P-R INTERVAL: 162 MS
EKG Q-T INTERVAL: 376 MS
EKG QRS DURATION: 88 MS
EKG QTC CALCULATION (BAZETT): 433 MS
EKG R AXIS: 16 DEGREES
EKG T AXIS: 53 DEGREES
EKG VENTRICULAR RATE: 80 BPM

## 2020-01-26 PROCEDURE — 93010 ELECTROCARDIOGRAM REPORT: CPT | Performed by: INTERNAL MEDICINE

## 2020-01-27 ENCOUNTER — TELEPHONE (OUTPATIENT)
Dept: FAMILY MEDICINE CLINIC | Age: 45
End: 2020-01-27

## 2020-01-27 NOTE — TELEPHONE ENCOUNTER
[facility] ([facility]) ED Follow up Call    Reason for ED visit:  Strain of left trapezius muscle    1/27/2020         FU appts/Provider:    Future Appointments   Date Time Provider Izabel Nelson   1/30/2020  2:00 PM Moira ramos psy [facility]   2/14/2020  9:00 AM DAV Arzola NP [facility] [address]   4/21/2020  9:00 AM Shanita Rocha MD [facility]         VOICEMAIL DOCUMENTATION - ERASE IF NOT USED  Hi, this message is for [place]. This is Maricruz Frank from [address] [place] [address] office. Just calling to see how you are doing after your recent visit to the Emergency Room. [address] [place] [address] wants to make sure you were able to fill any prescriptions and that you understand your discharge instructions. Please return our call if you need to make a follow up appointment with your provider or have any further needs. Our phone number is [phone]. Have a great day.

## 2020-01-30 ENCOUNTER — OFFICE VISIT (OUTPATIENT)
Dept: PSYCHOLOGY | Age: 45
End: 2020-01-30
Payer: COMMERCIAL

## 2020-01-30 PROCEDURE — 90837 PSYTX W PT 60 MINUTES: CPT | Performed by: SOCIAL WORKER
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
## 2020-03-20 ENCOUNTER — TELEPHONE (OUTPATIENT)
Dept: FAMILY MEDICINE CLINIC | Age: 45
End: 2020-03-20

## 2020-03-20 RX ORDER — FEXOFENADINE HCL 180 MG/1
180 TABLET ORAL DAILY
Qty: 30 TABLET | Refills: 0 | Status: SHIPPED | OUTPATIENT
Start: 2020-03-20 | End: 2020-04-19

## 2020-03-30 ENCOUNTER — VIRTUAL VISIT (OUTPATIENT)
Dept: PSYCHOLOGY | Age: 45
End: 2020-03-30
Payer: COMMERCIAL

## 2020-03-30 PROCEDURE — 99443 PR PHYS/QHP TELEPHONE EVALUATION 21-30 MIN: CPT | Performed by: SOCIAL WORKER

## 2020-03-31 NOTE — PROGRESS NOTES
depression. Diagnoses and all orders for this visit:    Major depressive disorder, recurrent episode, moderate ([address]        INTERVENTION  Discussed potential treatments for  depression, Established rapport and Supportive techniques      PLAN  Continue psychotherapy with [facility] via telehealth. INTERACTIVE COMPLEXITY  Is interactive complexity present?   No  Reason:  N/A  Additional Supporting Information:  N/A       Electronically signed by Mary Oneal on 4/9/2020 at 10:28 AM

## 2020-04-03 ENCOUNTER — VIRTUAL VISIT (OUTPATIENT)
Dept: PSYCHOLOGY | Age: 45
End: 2020-04-03
Payer: COMMERCIAL

## 2020-04-03 ENCOUNTER — TELEPHONE (OUTPATIENT)
Dept: PSYCHOLOGY | Age: 45
End: 2020-04-03

## 2020-04-03 PROCEDURE — 90837 PSYTX W PT 60 MINUTES: CPT | Performed by: SOCIAL WORKER
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
## 2020-04-06 ENCOUNTER — TELEPHONE (OUTPATIENT)
Dept: PSYCHOLOGY | Age: 45
End: 2020-04-06

## 2020-04-09 ENCOUNTER — VIRTUAL VISIT (OUTPATIENT)
Dept: PSYCHOLOGY | Age: 45
End: 2020-04-09
Payer: COMMERCIAL

## 2020-04-09 ENCOUNTER — TELEPHONE (OUTPATIENT)
Dept: FAMILY MEDICINE CLINIC | Age: 45
End: 2020-04-09

## 2020-04-09 PROCEDURE — 90834 PSYTX W PT 45 MINUTES: CPT | Performed by: SOCIAL WORKER

## 2020-04-09 NOTE — PROGRESS NOTES
identify how others view her vs. How she views herself to begin working on self-esteem. Elen Leal is working on achieving balance in her life. Elen Leal was in agreement with recommendations. PHQ Scores 1/23/2020 5/24/2018   PHQ2 Score 6 2   PHQ9 Score 21 2     Interpretation of Total Score Depression Severity: 1-4 = Minimal depression, 5-9 = Mild depression, 10-14 = Moderate depression, 15-19 = Moderately severe depression, 20-27 = Severe depression    How often pt has had thoughts of death or hurting self (if PHQ positive for depression):       FRIEDA 7 SCORE 1/23/2020   FRIEDA-7 Total Score 21     Interpretation of FRIEDA-7 score: 5-9 = mild anxiety, 10-14 = moderate anxiety, 15+ = severe anxiety. Recommend referral to behavioral health for scores 10 or greater. DIAGNOSIS  Elen Leal was seen today for depression. Diagnoses and all orders for this visit:    Major depressive disorder, recurrent episode, moderate ([facility] [address]        INTERVENTION  Discussed potential treatments for  depression, Discussed self-care (sleep, nutrition, rewarding activities, social support, exercise) and Discussed potential barriers to change      PLAN  Continue psychotherapy with [facility]. INTERACTIVE COMPLEXITY  Is interactive complexity present?   No  Reason:  N/A  Additional Supporting Information:  N/A       Electronically signed by Jason Gage on 4/26/2020 at 9:03 PM

## 2020-04-10 ENCOUNTER — TELEPHONE (OUTPATIENT)
Dept: PSYCHIATRY | Age: 45
End: 2020-04-10

## 2020-04-10 ENCOUNTER — TELEPHONE (OUTPATIENT)
Dept: FAMILY MEDICINE CLINIC | Age: 45
End: 2020-04-10

## 2020-04-13 ENCOUNTER — TELEPHONE (OUTPATIENT)
Dept: PSYCHIATRY | Age: 45
End: 2020-04-13

## 2020-04-23 ENCOUNTER — VIRTUAL VISIT (OUTPATIENT)
Dept: PSYCHOLOGY | Age: 45
End: 2020-04-23
Payer: COMMERCIAL

## 2020-04-23 PROCEDURE — 90834 PSYTX W PT 45 MINUTES: CPT | Performed by: SOCIAL WORKER

## 2020-05-08 ENCOUNTER — VIRTUAL VISIT (OUTPATIENT)
Dept: PSYCHOLOGY | Age: 45
End: 2020-05-08
Payer: COMMERCIAL

## 2020-05-08 PROCEDURE — 90834 PSYTX W PT 45 MINUTES: CPT | Performed by: SOCIAL WORKER

## 2020-05-08 RX ORDER — LURASIDONE HYDROCHLORIDE 20 MG/1
TABLET, FILM COATED ORAL
Qty: 30 TABLET | Refills: 0 | Status: SHIPPED | OUTPATIENT
Start: 2020-05-08 | End: 2020-05-14

## 2020-05-13 ENCOUNTER — HOSPITAL ENCOUNTER (EMERGENCY)
Age: 45
Discharge: HOME | End: 2020-05-13
Attending: EMERGENCY MEDICINE
Payer: COMMERCIAL

## 2020-05-13 VITALS
RESPIRATION RATE: 15 BRPM | HEIGHT: 60 IN | WEIGHT: 185 LBS | OXYGEN SATURATION: 98 % | TEMPERATURE: 98.4 F | BODY MASS INDEX: 36.32 KG/M2 | HEART RATE: 101 BPM | SYSTOLIC BLOOD PRESSURE: 119 MMHG | DIASTOLIC BLOOD PRESSURE: 85 MMHG

## 2020-05-13 PROCEDURE — 99282 EMERGENCY DEPT VISIT SF MDM: CPT

## 2020-05-13 ASSESSMENT — PAIN DESCRIPTION - LOCATION: LOCATION: LEG

## 2020-05-13 ASSESSMENT — PAIN DESCRIPTION - ORIENTATION: ORIENTATION: RIGHT

## 2020-05-13 ASSESSMENT — PAIN DESCRIPTION - PAIN TYPE: TYPE: ACUTE PAIN

## 2020-05-13 ASSESSMENT — PAIN SCALES - GENERAL: PAINLEVEL_OUTOF10: 8

## 2020-05-13 NOTE — ED PROVIDER NOTES
[address] ED  eMERGENCY dEPARTMENT eNCOUnter      Pt Name: Maria Teresa Adhikari  MRN: [id number]  [place] 1975  Date of evaluation: 5/13/20      CHIEF COMPLAINT       Chief Complaint   Patient presents with    Leg Pain     Right- post fall on Mothers day- stepped wrong off second step     HISTORY OF PRESENT ILLNESS   HPI 38 y.o. female was emergency department for evaluation of right ankle pain. The patient reports that she twisted her ankle on Sunday. She went to an urgent care, she had x-rays, there is no fracture, and Ace wrap was applied. She says that she was starting to feel better, she went out and do some shopping today and the pain in her right ankle got worsened. She called her doctor's office, they recommended she go back to an urgent care, she waited there for about an hour and then they said since she already had an x-ray to go to the emergency department to have an MRI done. Reports her pain is 8 out of 10, located over the lateral aspect of her right ankle, no new fall or injury. She has been using an Ace wrap for pain. REVIEW OF SYSTEMS       Review of Systems   Musculoskeletal: Positive for arthralgias. Skin: Negative for rash and wound. Neurological: Negative for numbness.        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[address]     Sjogren's disease ([address]        SURGICAL HISTORY       Past Surgical History:   Procedure Laterality Date    HYSTERECTOMY      partial hysterectomy  PAROTIDECTOMY Right     TONSILLECTOMY      UPPER GASTROINTESTINAL ENDOSCOPY  01/26/2016    chronic inactive gastritis    VARICOSE VEIN SURGERY Right        CURRENT MEDICATIONS       Previous Medications    ATORVASTATIN (LIPITOR) 20 MG TABLET    Take 1 tablet by mouth daily    CHLORHEXIDINE (PERIDEX) 0.12 % SOLUTION        EPINEPHRINE (EPIPEN) 0.3 MG/0.3ML SOAJ INJECTION    Use as directed    FLUTICASONE (FLONASE) 50 MCG/ACT NASAL SPRAY        KETOTIFEN (ZADITOR) 0.025 % OPHTHALMIC SOLUTION    ketotifen 0.025 % (0.035 %) eye drops    LATUDA 20 MG TABS TABLET    TAKE 1 TABLET BY MOUTH DAILY    LIDOCAINE (LIDODERM) 5 %    Place 1 patch onto the skin daily 12 hours on, 12 hours off. MELATONIN 3 MG TABS TABLET    Take 1 tablet by mouth nightly as needed (sleep)    METFORMIN (GLUCOPHAGE-XR) 500 MG EXTENDED RELEASE TABLET    Take 500 mg by mouth    OMEGA-3 FATTY ACIDS (FISH OIL) 1000 MG CAPS    TAKE 1 (ONE) CAPSULE TWO TIMES DAILY    OMEPRAZOLE (PRILOSEC OTC) 20 MG TABLET    Take 1 tablet by mouth daily    OMEPRAZOLE (PRILOSEC) 20 MG DELAYED RELEASE CAPSULE    TAKE 1 CAPSULE BY MOUTH ONCE DAILY AS NEEDED    PRAZOSIN (MINIPRESS) 1 MG CAPSULE    Take 1 capsule by mouth nightly    SENNA LAX 8.6 MG TABLET    TAKE TWO TABLET BY MOUTH TWICE A DAY    SM ASPIRIN ADULT LOW STRENGTH 81 MG EC TABLET    TAKE 1 TABLET BY MOUTH DAILY    SM MUCUS RELIEF MAX STRENGTH 1200 MG TB12    TAKE 1 (ONE) TABLET EVERY TWELVE HOURS    TETRACYCLINE (ACHROMYCIN;SUMYCIN) 500 MG CAPSULE    tetracycline 500 mg capsule    TIZANIDINE (ZANAFLEX) 2 MG TABLET    tizanidine 2 mg tablet    TRIAMCINOLONE (ARISTOCORT) 0.5 % CREAM    triamcinolone acetonide 0.5 % topical cream    TRUE METRIX BLOOD GLUCOSE TEST STRIP    PATIENT CHECKS SUGAR 2-3 TIMES DAILY AND PRN    TRUEPLUS LANCETS 28G MISC    1 (ONE) MISC MISC TWO TIMES DAILY    VILAZODONE HCL (VIIBRYD) 20 MG TABS    Take 0.5 tablets by mouth daily for 7 days, THEN 1 tablet daily for 23 days. dislocation, or destructive lesion.        Impression:        1. No acute findings.  Consider MRI if you suspect occult process.     EMERGENCY DEPARTMENT COURSE:   Vitals:    Vitals:    05/13/20 1459   BP: 119/85   Pulse: 101   Resp: 15   Temp: 98.4 °F (36.9 °C)   TempSrc: Oral   SpO2: 98%   Weight: 185 lb (83.9 kg)   Height: 5' (1.524 m)       The patient was given the following medications while in the emergency department:  No orders of the defined types were placed in this encounter. -------------------------  CRITICAL CARE:   CONSULTS: None  PROCEDURES: Procedures     FINAL IMPRESSION      1.  Acute right ankle pain          DISPOSITION/PLAN   DISPOSITION Decision To Discharge 05/13/2020 03:14:39 PM      PATIENT REFERRED TO:  Funmi Monroe MD  [facility] [address] [facility]  [phone]    In 1 week        DISCHARGE MEDICATIONS:  New Prescriptions    No medications on file         Sage Way MD  Attending Emergency Physician                      Sage Way MD  05/13/20 3874

## 2020-05-14 ENCOUNTER — TELEPHONE (OUTPATIENT)
Dept: FAMILY MEDICINE CLINIC | Age: 45
End: 2020-05-14

## 2020-05-14 PROBLEM — E55.9 VITAMIN D DEFICIENCY: Status: ACTIVE | Noted: 2020-05-14

## 2020-05-14 PROBLEM — J30.2 OTHER SEASONAL ALLERGIC RHINITIS: Status: ACTIVE | Noted: 2017-10-06

## 2020-05-14 PROBLEM — K21.9 GASTRO-ESOPHAGEAL REFLUX DISEASE WITHOUT ESOPHAGITIS: Status: ACTIVE | Noted: 2017-10-06

## 2020-05-14 PROBLEM — M75.121 COMPLETE ROTATOR CUFF TEAR OR RUPTURE OF RIGHT SHOULDER, NOT SPECIFIED AS TRAUMATIC: Status: ACTIVE | Noted: 2017-10-06

## 2020-05-14 PROBLEM — F17.210 NICOTINE DEPENDENCE, CIGARETTES, UNCOMPLICATED: Status: ACTIVE | Noted: 2017-10-06

## 2020-05-14 PROBLEM — E78.2 MIXED HYPERLIPIDEMIA: Status: ACTIVE | Noted: 2020-01-16

## 2020-05-14 RX ORDER — FEXOFENADINE HCL 180 MG/1
1 TABLET ORAL DAILY
COMMUNITY
Start: 2020-04-23 | End: 2020-07-17 | Stop reason: SDUPTHER

## 2020-05-14 NOTE — TELEPHONE ENCOUNTER
[facility]) ED Follow up Call    Reason for ED visit:           Jasmin Miranda , this is Isrrael Moore from Dr. Erin Byrne office, just calling to see how you are doing after your recent ED visit. Did you receive discharge instructions? Yes  Do you understand the discharge instructions? Yes  Did the ED give you any new prescriptions? No:   Were you able to fill your prescriptions? No:       Do you have one of our red, yellow and green  Zone sheets that help you to determine when you should go to the ED? Yes      Do you need or want to make a follow up appt with your PCP? Yes    Do you have any further needs in the home i.e. Equipment?   No        FU appts/Provider:    Future Appointments   Date Time Provider Izabel Nelson   5/15/2020 12:00 PM Shahla Ventura stv psych [facility]   5/15/2020  2:30 PM DAV Cuevas CNP [facility]   6/29/2020  9:00 AM DAV Cuevas CNP [facility]

## 2020-05-14 NOTE — PROGRESS NOTES
1 (ONE) TABLET EVERY TWELVE HOURS Yes Historical Provider, MD   vitamin D (ERGOCALCIFEROL) 1.25 MG (11014 UT) CAPS capsule 1 (ONE) CAPSULE ONCE A WEEK Yes Historical Provider, MD   tetracycline (ACHROMYCIN;SUMYCIN) 500 MG capsule tetracycline 500 mg capsule Yes Historical Provider, MD   EPINEPHrine (EPIPEN) 0.3 MG/0.3ML SOAJ injection Use as directed Yes Rickie Royal MD   prazosin (MINIPRESS) 1 MG capsule Take 1 capsule by mouth nightly Yes DAV Lomas NP   TRUE METRIX BLOOD GLUCOSE TEST strip PATIENT CHECKS SUGAR 2-3 TIMES DAILY AND PRN Yes Historical Provider, MD   chlorhexidine (PERIDEX) 0.12 % solution  Yes Historical Provider, MD   fluticasone (FLONASE) 50 MCG/ACT nasal spray  Yes Historical Provider, MD   metFORMIN (GLUCOPHAGE-XR) 500 MG extended release tablet Take 500 mg by mouth Yes Historical Provider, MD   SENNA LAX 8.6 MG tablet Rosa Mallory Yes Tammie Anthony MD   omeprazole (PRILOSEC OTC) 20 MG tablet Take 1 tablet by mouth daily Yes Tammie Anthony MD   vilazodone HCl (VIIBRYD) 20 MG TABS Take 0.5 tablets by mouth daily for 7 days, THEN 1 tablet daily for 23 days.   DAV Lomas NP   traZODone (DESYREL) 100 MG tablet trazodone 100 mg tablet  Historical Provider, MD       Social History     Tobacco Use    Smoking status: Current Every Day Smoker     Packs/day: 0.50     Years: 27.00     Pack years: 13.50     Types: Cigarettes    Smokeless tobacco: Never Used   Substance Use Topics    Alcohol use: No    Drug use: No        PHYSICAL EXAMINATION:  Vital Signs: (As obtained by patient/caregiver or practitioner observation)    Constitutional: [x] Appears well-developed and well-nourished [x] No apparent distress      [x] Abnormal- obese  Mental status  [x] Alert and awake  [x] Oriented to person/place/time [x]Able to follow commands      Eyes:  EOM    [x]  Normal  [] Abnormal-  Sclera  [x]  Normal  [] Abnormal -         Discharge [x]  None visible  [] Abnormal -    HENT:   [x] Normocephalic, atraumatic. [] Abnormal   [x] Mouth/Throat: Mucous membranes are moist.     External Ears [x] Normal  [] Abnormal-     Neck: [x] No visualized mass     Pulmonary/Chest: [x] Respiratory effort normal.  [x] No visualized signs of difficulty breathing or respiratory distress        [] Abnormal     Musculoskeletal:   [x] Normal gait with no signs of ataxia         [x] Normal range of motion of neck        [] Abnormal-     Neurological:        [x] No Facial Asymmetry (Cranial nerve 7 motor function) (limited exam to video visit)          [x] No gaze palsy        [] Abnormal-     Skin:        [x] No significant exanthematous lesions or discoloration noted on facial skin         [] Abnormal-     Psychiatric:       [x] Normal Affect [x] No Hallucinations        [x] Abnormal- Anxious    Other pertinent observable physical exam findings- Swollen right ankle, bigger than the left. Tender to patients palpation on the lateral aspect. Decreased ROM especially with flexion. Circulation seems intact. No bruising noted, Non weight bearing (uses crutches).      Lab Results   Component Value Date    WBC 9.2 10/08/2019    HGB 14.7 10/08/2019    HCT 43.3 10/08/2019    MCV 89.7 10/08/2019     10/08/2019     Lab Results   Component Value Date     10/08/2019    K 4.3 10/08/2019     10/08/2019    CO2 24 10/08/2019    BUN 8 10/08/2019    CREATININE 0.69 10/08/2019    GLUCOSE 231 10/08/2019    CALCIUM 9.8 10/08/2019      Lab Results   Component Value Date    ALT 86 (H) 10/08/2019    AST 54 (H) 10/08/2019    ALKPHOS 123 (H) 10/08/2019    BILITOT 0.32 10/08/2019     Lab Results   Component Value Date    TSH 2.24 10/08/2019     Lab Results   Component Value Date    CHOL 193 10/08/2019    CHOL 190 04/23/2018     Lab Results   Component Value Date    TRIG 165 (H) 10/08/2019    TRIG 117 04/23/2018     Lab Results   Component Value Date    HDL 40 (L) 10/08/2019    HDL 36 (L) 04/23/2018

## 2020-05-15 ENCOUNTER — TELEPHONE (OUTPATIENT)
Dept: FAMILY MEDICINE CLINIC | Age: 45
End: 2020-05-15

## 2020-05-15 ENCOUNTER — VIRTUAL VISIT (OUTPATIENT)
Dept: PSYCHOLOGY | Age: 45
End: 2020-05-15
Payer: COMMERCIAL

## 2020-05-15 ENCOUNTER — TELEMEDICINE (OUTPATIENT)
Dept: FAMILY MEDICINE CLINIC | Age: 45
End: 2020-05-15
Payer: COMMERCIAL

## 2020-05-15 VITALS — WEIGHT: 186 LBS | HEIGHT: 60 IN | BODY MASS INDEX: 36.52 KG/M2

## 2020-05-15 PROBLEM — E78.00 PURE HYPERCHOLESTEROLEMIA: Status: ACTIVE | Noted: 2018-05-24

## 2020-05-15 PROBLEM — E11.9 DIABETES MELLITUS (HCC): Status: ACTIVE | Noted: 2020-01-16

## 2020-05-15 PROBLEM — Z80.3 FAMILY HISTORY OF MALIGNANT NEOPLASM OF BREAST: Status: ACTIVE | Noted: 2018-05-24

## 2020-05-15 PROCEDURE — G8427 DOCREV CUR MEDS BY ELIG CLIN: HCPCS | Performed by: FAMILY MEDICINE

## 2020-05-15 PROCEDURE — 99214 OFFICE O/P EST MOD 30 MIN: CPT | Performed by: FAMILY MEDICINE

## 2020-05-15 PROCEDURE — 90837 PSYTX W PT 60 MINUTES: CPT | Performed by: SOCIAL WORKER

## 2020-05-15 RX ORDER — IBUPROFEN 600 MG/1
1 TABLET ORAL 4 TIMES DAILY PRN
COMMUNITY
Start: 2019-01-29 | End: 2020-05-15

## 2020-05-15 RX ORDER — CYCLOBENZAPRINE HCL 10 MG
1 TABLET ORAL 3 TIMES DAILY PRN
COMMUNITY

## 2020-05-15 RX ORDER — IBUPROFEN 600 MG/1
600 TABLET ORAL 4 TIMES DAILY PRN
Qty: 180 TABLET | Refills: 1 | Status: SHIPPED | OUTPATIENT
Start: 2020-05-15 | End: 2020-08-07

## 2020-05-15 RX ORDER — ATORVASTATIN CALCIUM 20 MG/1
1 TABLET, FILM COATED ORAL DAILY
COMMUNITY
Start: 2018-05-07 | End: 2020-07-27 | Stop reason: ALTCHOICE

## 2020-05-15 RX ORDER — ASPIRIN 81 MG/1
1 TABLET ORAL DAILY
COMMUNITY
Start: 2020-01-07 | End: 2021-06-14 | Stop reason: ALTCHOICE

## 2020-05-15 ASSESSMENT — ENCOUNTER SYMPTOMS
ABDOMINAL PAIN: 0
NAUSEA: 0
DIARRHEA: 0
CONSTIPATION: 0
COUGH: 0
SHORTNESS OF BREATH: 0
RESPIRATORY NEGATIVE: 1

## 2020-05-15 NOTE — TELEPHONE ENCOUNTER
Doe Reyes called again she needed the MRI without contrast and stat   Diagnosis Orders   1. Supination-eversion injury of right ankle, subsequent encounter  MRI ANKLE RIGHT WO CONTRAST   2.  Sprain of other ligament of right ankle, subsequent encounter  MRI ANKLE RIGHT WO CONTRAST

## 2020-05-15 NOTE — PROGRESS NOTES
dates back to her identity and not being quite sure who she is. She discussed how [employer] has played a huge role in that as well as being from two very different families. She elaborated on how her father and some of his family not accepting her has negatively impacted her emotionally and how she feels about herself. Shobha Hamilton shared a desire for acceptance from her father that she has never gotten. Shobha Hamilton also shared frustration with always being surrounded by drama, she reported that everyone comes to her with her problems. Shobha Hamilton has had a lot of emotions that she carries from others; she shared today in session how this weighs upon her. Previous Recommendations: Continue psychotherapy with [facility] [place] via telehealth. MENTAL STATUS EXAM  Mood was sad with flat affect. Suicidal ideation was denied. Homicidal ideation was denied. Hygiene was N/A. Dress was appropriate. Behavior was Within Normal Limits with No observation or self-report of difficulties ambulating. Attitude was Engageable. Eye-contact was good. Speech: rate - WNL, rhythm - WNL, volume - WNL. Verbalizations were goal directed. Thought processes were intact and goal-oriented without evidence of delusions, hallucinations, obsessions, or dario; with moderate cognitive distortions. Associations were characterized by intact cognitive processes. Pt was oriented to person, place, time, and general circumstances;  recent:  good. Insight and judgment were estimated to be good, AEB, a fair  understanding of cyclical maladaptive patterns, and the ability to use insight to inform behavior change. ASSESSMENT  Polly Shin presented to the appointment today for evaluation and treatment of symptoms of depression and anxiety. She is currently deemed no risk to herself or others and meets criteria for Major Depressive Disorder and Anxiety Disorder, unspecified.  Shobha Hamilton shared that she continues to experience anxiety and depression, she

## 2020-05-15 NOTE — TELEPHONE ENCOUNTER
Page received regarding the order, I changed the order to with contrast only  LVM to [facility] to page me,  at [phone] from scheduling   Diagnosis Orders   1. Supination-eversion injury of right ankle, subsequent encounter  MRI FOOT RIGHT W CONTRAST   2.  Sprain of other ligament of right ankle, subsequent encounter  MRI FOOT RIGHT W CONTRAST      Future Appointments   Date Time Provider Izabel Nelson   5/18/2020  9:00 AM DAV Johnson - NP [facility] [address]   5/20/2020 12:00 PM DONA Ding stv psych [facility]   6/29/2020  9:00 AM DAV Cuevas - CNP [facility] [address] [facility]

## 2020-05-16 ENCOUNTER — HOSPITAL ENCOUNTER (OUTPATIENT)
Dept: MRI IMAGING | Age: 45
Discharge: HOME | End: 2020-05-18
Payer: COMMERCIAL

## 2020-05-16 PROCEDURE — 73721 MRI JNT OF LWR EXTRE W/O DYE: CPT

## 2020-05-18 ENCOUNTER — TELEMEDICINE (OUTPATIENT)
Dept: PSYCHIATRY | Age: 45
End: 2020-05-18
Payer: COMMERCIAL

## 2020-05-18 PROCEDURE — G8427 DOCREV CUR MEDS BY ELIG CLIN: HCPCS | Performed by: NURSE PRACTITIONER

## 2020-05-18 PROCEDURE — 99213 OFFICE O/P EST LOW 20 MIN: CPT | Performed by: NURSE PRACTITIONER

## 2020-05-18 RX ORDER — LANOLIN ALCOHOL/MO/W.PET/CERES
3 CREAM (GRAM) TOPICAL NIGHTLY PRN
Qty: 30 TABLET | Refills: 3 | Status: SHIPPED | OUTPATIENT
Start: 2020-05-18 | End: 2022-02-01 | Stop reason: SDUPTHER

## 2020-05-18 NOTE — PROGRESS NOTES
Behavioral Health Consultation  Elton Rhoades, MSN, APRN-CNP, PMHNP-BC  5/18/2020, 11:24 AM      Time spent with Patient:  30 minutes  This  was a telehealth visit. Patient Location: Home in [place], [place]  Provider Location: Home office in [place], [place]  This virtual visit was conducted via interactive/real-time audio/video. Chief Complaint:follow up. [place]:  Reason for visit is medication management follow up. She has not been compliant with medications. Pt reports that medications have not  been working. Pt reports side effects from medications. Deandra Bolivar states that she felt like she was \"stuck in a cloud,\" when taking the viibryd. Deandra Bolivar states that Friday last week was the last time she took the medication. Deandra Bolivar states that she has been feeling better off meds, and doing therapy. Deandra Bolivar states that she would like to try to manage things without medications. Pt denies hallucinations. Pt reports there has been changes to appetite. Pt reports sleep has been \"really good\". Deandra Bolivar states that she has been sleeping really well with the melatonin. Pt denies  current exercise. Pt denies current suicidal ideation, plan and intent. Pt  denies current homicidal ideation, plan and [email or web address]). Social: for 15 years, 10 kids total, 4 kids from a previous marriage,  has five of his own, and they adopted one and just got custody of her nephew. 2 living in house still.  Staying at home currently. Crystalcarine Isha support from .    Past Psychiatric history:   The patient has a history of  depression, anxiety disorder, obsessive-compulsive disorder, bipolar disorder, PTSD and attempted suicide.     Current treatment includes nothing.    Previous treatment has included: Prozac- went aggressive while on this med- 7-8 years prior, Zoloft- worked once- but the second time she took it- vomiting, Effexor- felt it worked initially but can't remember why she stopped, Wellbutrin- getting Moderate episode of recurrent major depressive disorder ([facility] [address]      Plan:    Discussed having her continue on the melatonin and therapy. Discussed risks and benefits of medications, as well as need for yearly lab work. Follow up with Dorita Mcclain for continued therapy. 25 minutes spent face to face with greater than 50% was spent coordinating care, and therapy. Continue work with PCP to manage medical concerns, and [facility] for continued follow-up. No orders of the defined types were placed in this encounter.      Orders Placed This Encounter   Medications    melatonin 3 MG TABS tablet     Sig: Take 1 tablet by mouth nightly as needed (sleep)     Dispense:  30 tablet     Refill:  3       Pt interventions:    Discussed importance of medication adherence, Discussed risks, benefits, side effects of medication and need for follow up treatment, Discussed benefits of referral for specialty care and Discussed self-care (sleep, nutrition, rewarding activities, social support, exercise)

## 2020-05-20 ENCOUNTER — VIRTUAL VISIT (OUTPATIENT)
Dept: PSYCHOLOGY | Age: 45
End: 2020-05-20
Payer: COMMERCIAL

## 2020-05-20 PROCEDURE — 90834 PSYTX W PT 45 MINUTES: CPT | Performed by: SOCIAL WORKER

## 2020-05-20 ASSESSMENT — PATIENT HEALTH QUESTIONNAIRE - PHQ9
1. LITTLE INTEREST OR PLEASURE IN DOING THINGS: 2
7. TROUBLE CONCENTRATING ON THINGS, SUCH AS READING THE NEWSPAPER OR WATCHING TELEVISION: 3
5. POOR APPETITE OR OVEREATING: 0
2. FEELING DOWN, DEPRESSED OR HOPELESS: 3
3. TROUBLE FALLING OR STAYING ASLEEP: 0
8. MOVING OR SPEAKING SO SLOWLY THAT OTHER PEOPLE COULD HAVE NOTICED. OR THE OPPOSITE, BEING SO FIGETY OR RESTLESS THAT YOU HAVE BEEN MOVING AROUND A LOT MORE THAN USUAL: 0
SUM OF ALL RESPONSES TO PHQ QUESTIONS 1-9: 12
6. FEELING BAD ABOUT YOURSELF - OR THAT YOU ARE A FAILURE OR HAVE LET YOURSELF OR YOUR FAMILY DOWN: 3
9. THOUGHTS THAT YOU WOULD BE BETTER OFF DEAD, OR OF HURTING YOURSELF: 0
SUM OF ALL RESPONSES TO PHQ QUESTIONS 1-9: 12
SUM OF ALL RESPONSES TO PHQ9 QUESTIONS 1 & 2: 5
4. FEELING TIRED OR HAVING LITTLE ENERGY: 1
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
## 2020-05-20 NOTE — PROGRESS NOTES
with, as well as how she believes others perceive her. Among having negative feelings about self, she shared that she has also had issues with her  lately, making things harder for [facility]. She reported that she also doesn't have any friends or supports in her life outside of her mother. Previous Recommendations: Continue engaging in psychotherapy to address depressive and anxious symptoms. Developing healthy self-esteem. MENTAL STATUS EXAM  Mood was anxious with flat affect. Suicidal ideation was denied. Homicidal ideation was denied. Hygiene was N/A. Dress was appropriate. Behavior was Within Normal Limits with No observation or self-report of difficulties ambulating. Attitude was Engageable. Eye-contact was good. Speech: rate - WNL, rhythm - WNL, volume - WNL. Verbalizations were coherent. Thought processes were intact and goal-oriented without evidence of delusions, hallucinations, obsessions, or dario; with moderate cognitive distortions. Associations were characterized by intact cognitive processes. Pt was oriented to person, place, time, and general circumstances;  recent:  good. Insight and judgment were estimated to be good, AEB, a fair  understanding of cyclical maladaptive patterns, and the ability to use insight to inform behavior change. ASSESSMENT  Brodie Wang presented to the appointment today for evaluation and treatment of symptoms of depression. She is currently deemed no risk to herself or others and meets criteria for Major Depressive Disorder. [facility] verbalized being unhappy with herself physically and emotionally. She shared that she has noticed an increase in depression and anxiety, she denied any suicidal ideation but made a comment during session of \"Why am I here? \" [facility] appeared to be frustrated with some of the relationships in her life and identified a lack of support.  We discussed the goal of [facility] beginning to work on developing healthy

## 2020-06-03 ENCOUNTER — VIRTUAL VISIT (OUTPATIENT)
Dept: PSYCHOLOGY | Age: 45
End: 2020-06-03
Payer: COMMERCIAL

## 2020-06-03 PROCEDURE — 90834 PSYTX W PT 45 MINUTES: CPT | Performed by: SOCIAL WORKER

## 2020-06-09 NOTE — PROGRESS NOTES
ADULT BEHAVIORAL HEALTH FOLLOW UP  DONA Siddiqui  Licensed Independent          Visit Date: 6/3/2020   Time of appointment: 12:29 pm    Time spent with Patient: 46 minutes. This is patient's ninth appointment. Patient Location: [facility]/Clinician Location: Home office; [facility]   This was a telehealth visit. This virtual visit was conducted via interactive/real-time audio/video. Reason for Consult:  Depression     PCP:  DAV Cuevas CNP      Pt provided informed consent for the behavioral health program. Discussed with patient model of service to include the limits of confidentiality (i.e. abuse reporting, suicide intervention, etc.) and short-term intervention focused approach. Pt indicated understanding. Patient provided verbal consent to engage in telehealth psychotherapy. This visit was completed virtually using doxy. me due to contact restrictions related to the COVID-19 pandemic. This session was held in a private space (patient's bedroom in her home located in [facility]). Evelyn Quinones is a 41 y.o. female who presents for follow up of depression. Chris Segura shared that she has had a lot going on in her life recently. She reported that her mother has recently moved in with the family so this has been a transition. Chris Segura also shared that people have been trying to break up or mess with her marriage, which has been upsetting for Chris Segura. Chris Segura shared what recent things have transpired that encourage her to believe this. She reported feeling overwhelmed and stated she has not yet finished homework assignments from last session. Chris Segura shared she does not have a lot of time to herself to work on things. Chris Segura identified that not being able to work the kind of jobs she was used to continues to frustrate her and she is having difficulty trying to accept that she can no longer do that kind of work.      Previous Recommendations: Chris Segura was encouraged to continue working on her homework assignment of discounting the negatives, working on developing healthy self-esteem. Fay Alas and I also briefly discussed the grief process, encouraged Rasheedafrank Ivoryes to research more about this on her own as she enjoys reading. Fay Alas shared that she enjoys helping people and would like to think about how she can help others, Fay Alas shared that she might be interested in getting another health care certification. We also discussed implementing self-care time for Fay Alas to focus on herself. MENTAL STATUS EXAM  Mood was depressed with congruent affect. Suicidal ideation was denied. Homicidal ideation was denied. Hygiene was n/a. Dress was appropriate. Behavior was Within Normal Limits with No observation or self-report of difficulties ambulating. Attitude was Engageable. Eye-contact was good. Speech: rate - WNL, rhythm - WNL, volume - WNL. Verbalizations were coherent. Thought processes were intact and goal-oriented without evidence of delusions, hallucinations, obsessions, or dario; with moderate cognitive distortions. Associations were characterized by intact cognitive processes. Pt was oriented to person, place, time, and general circumstances;  recent:  good. Insight and judgment were estimated to be good, AEB, a good  understanding of cyclical maladaptive patterns, and the ability to use insight to inform behavior change. ASSESSMENT  González Dietrich presented to the appointment today for evaluation and treatment of symptoms of depression. She is currently deemed no risk to herself or others and meets criteria for Major Depressive Disorder. Fay Alas shared that she continues to experience depression. 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  I encouraged Fay Alas to continue working on her homework assignment given at

## 2020-06-26 PROBLEM — E11.42 TYPE 2 DIABETES MELLITUS WITH DIABETIC POLYNEUROPATHY (HCC): Status: ACTIVE | Noted: 2018-05-24

## 2020-06-29 ENCOUNTER — TELEMEDICINE (OUTPATIENT)
Dept: PSYCHOLOGY | Age: 45
End: 2020-06-29
Payer: COMMERCIAL

## 2020-06-29 PROCEDURE — 90834 PSYTX W PT 45 MINUTES: CPT | Performed by: SOCIAL WORKER

## 2020-06-29 NOTE — PROGRESS NOTES
ADULT BEHAVIORAL HEALTH FOLLOW UP  DONA Germain  Licensed Independent          Visit Date: 6/29/2020   Time of appointment: 12:28 pm    Time spent with Patient: 38 minutes. This is patient's tenth appointment. Patient Location: [facility]/Clinician Location: Home office; [facility]   This was a telehealth visit. This virtual visit was conducted via interactive/real-time audio/video. Reason for Consult:  Depression     PCP:  DAV Cuevas CNP      Pt provided informed consent for the behavioral health program. Discussed with patient model of service to include the limits of confidentiality (i.e. abuse reporting, suicide intervention, etc.) and short-term intervention focused approach. Pt indicated understanding. Patient provided verbal consent to engage in telehealth psychotherapy. This visit was completed virtually using My Chart due to contact restrictions related to the COVID-19 pandemic. This session was held in a private space (patient's bedroom in her home located in [place] Gabriel Weems is a 41 y.o. female who presents for follow up of depression. Gage bishop shared that she has continued to feel overwhelmed with everything going on in her life. [place] shared that her son continues to have significant behaviors and defiance. She reported not knowing what else to do for him and that this can be overwhelming. 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 [place] shared that the transition of her mother moving in has went ok, she shared that the family is getting a break while her mother goes over to another family member's home to baby sit. [place] shared the only frustrating thing with her mother has been her mother expecting [place] to do things for her that she knows she can manage herself.  Gage bishop shared that being so busy has been stressful, she reported \"everyone always wants to be at grandmas\", referring to her home since they have a pool. Abbi Martinez shared that she did take advantage of time this weekend to briefly get away from the house without the kids, where she went camping with friends/family. She shared being disappointed due to only being there a short time and for the behavior of others who were staying at the camp grounds, who were being loud and often dangerous drinking and driving through the camp site. Previous Recommendations: Continue tele-health psychotherapy, work on improving self-care. MENTAL STATUS EXAM  Mood was depressed with congruent affect. Suicidal ideation was denied. Homicidal ideation was denied. Hygiene was n/a. Dress was appropriate. Behavior was Within Normal Limits with No observation or self-report of difficulties ambulating. Attitude was Engageable. Eye-contact was good. Speech: rate - WNL, rhythm - WNL, volume - WNL. Verbalizations were coherent. Thought processes were intact and goal-oriented without evidence of delusions, hallucinations, obsessions, or dario; with moderate cognitive distortions. Associations were characterized by intact cognitive processes. Pt was oriented to person, place, time, and general circumstances;  recent:  good. Insight and judgment were estimated to be good, AEB, a good  understanding of cyclical maladaptive patterns, and the ability to use insight to inform behavior change. ASSESSMENT  Seymour Talbot presented to the appointment today for evaluation and treatment of symptoms of depression. She is currently deemed no risk to herself or others and meets criteria for Major Depressive Disorder. Abbi Martinez shared that she continues to experience depression.  She shared that although she was able to get out of the house and go camping this weekend, that it was a very short trip, that she didn't get to truly enjoy due to other's behavior on the camp grounds. [facility] shared also being disappointed that she has only sold about 20 pieces of jewelry from her collection, as she is hoping to make enough money to complete an online training for substance abuse. [facility] continues to be frustrated with her functioning and wishes to be able to work like she once did. We discussed the importance of [facility] trying to do things that will promote self-care, even if only small tasks, as she is often taking care of others. PHQ Scores 5/20/2020 1/23/2020 5/24/2018   PHQ2 Score 5 6 2   PHQ9 Score 12 21 2     Interpretation of Total Score Depression Severity: 1-4 = Minimal depression, 5-9 = Mild depression, 10-14 = Moderate depression, 15-19 = Moderately severe depression, 20-27 = Severe depression    How often pt has had thoughts of death or hurting self (if PHQ positive for depression):       FRIEDA 7 SCORE 1/23/2020   FRIEDA-7 Total Score 21     Interpretation of FRIEDA-7 score: 5-9 = mild anxiety, 10-14 = moderate anxiety, 15+ = severe anxiety. Recommend referral to behavioral health for scores 10 or greater. DIAGNOSIS  [facility] was seen today for depression. Diagnoses and all orders for this visit:    Major depressive disorder, recurrent episode, moderate ([address]        INTERVENTION  Discussed various factors related to the development and maintenance of  depression (including biological, cognitive, behavioral, and environmental factors), Trained in strategies for increasing balanced thinking, Provided education, Discussed self-care (sleep, nutrition, rewarding activities, social support, exercise), Supportive techniques and Emphasized self-care as important for managing overall health. PLAN  Continue tele-health psychotherapy to address depression and anxiety. Continue to develop self-care routines. INTERACTIVE COMPLEXITY  Is interactive complexity present?   No  Reason:  N/A  Additional Supporting Information:  N/A       Electronically signed by STEVEN SOSA [facility] Yasmin Sweeney on 7/6/2020 at 12:51 PM

## 2020-07-08 ENCOUNTER — HOSPITAL ENCOUNTER (OUTPATIENT)
Dept: VASCULAR LAB | Age: 45
Discharge: HOME | End: 2020-07-08
Payer: COMMERCIAL

## 2020-07-08 PROCEDURE — 93971 EXTREMITY STUDY: CPT

## 2020-07-17 RX ORDER — FEXOFENADINE HCL 180 MG/1
180 TABLET ORAL DAILY
Qty: 90 TABLET | Refills: 2 | Status: SHIPPED | OUTPATIENT
Start: 2020-07-17 | End: 2020-07-27 | Stop reason: SDUPTHER

## 2020-07-17 NOTE — TELEPHONE ENCOUNTER
Please Approve or Refuse.   Send to Pharmacy per Pt's Request:      Next Visit Date:  7/27/2020   Last Visit Date: 1/16/2020    Hemoglobin A1C (%)   Date Value   06/11/2018 6.9   03/06/2018 7.4             ( goal A1C is < 7)   BP Readings from Last 3 Encounters:   05/13/20 119/85   01/24/20 139/84   01/24/20 132/84          (goal 120/80)  BUN   Date Value Ref Range Status   10/08/2019 8 6 - 20 mg/dL Final     CREATININE   Date Value Ref Range Status   10/08/2019 0.69 0.50 - 0.90 mg/dL Final     Potassium   Date Value Ref Range Status   10/08/2019 4.3 3.7 - 5.3 mmol/L Final

## 2020-07-27 ENCOUNTER — OFFICE VISIT (OUTPATIENT)
Dept: FAMILY MEDICINE CLINIC | Age: 45
End: 2020-07-27
Payer: COMMERCIAL

## 2020-07-27 VITALS
OXYGEN SATURATION: 98 % | TEMPERATURE: 97.8 F | SYSTOLIC BLOOD PRESSURE: 132 MMHG | BODY MASS INDEX: 35.54 KG/M2 | HEART RATE: 90 BPM | WEIGHT: 182 LBS | DIASTOLIC BLOOD PRESSURE: 80 MMHG

## 2020-07-27 PROBLEM — E66.01 CLASS 2 SEVERE OBESITY DUE TO EXCESS CALORIES WITH SERIOUS COMORBIDITY AND BODY MASS INDEX (BMI) OF 36.0 TO 36.9 IN ADULT (HCC): Status: ACTIVE | Noted: 2020-07-27

## 2020-07-27 PROBLEM — E66.812 CLASS 2 SEVERE OBESITY DUE TO EXCESS CALORIES WITH SERIOUS COMORBIDITY AND BODY MASS INDEX (BMI) OF 36.0 TO 36.9 IN ADULT: Status: ACTIVE | Noted: 2020-07-27

## 2020-07-27 PROCEDURE — 4004F PT TOBACCO SCREEN RCVD TLK: CPT | Performed by: FAMILY MEDICINE

## 2020-07-27 PROCEDURE — 3046F HEMOGLOBIN A1C LEVEL >9.0%: CPT | Performed by: FAMILY MEDICINE

## 2020-07-27 PROCEDURE — G8417 CALC BMI ABV UP PARAM F/U: HCPCS | Performed by: FAMILY MEDICINE

## 2020-07-27 PROCEDURE — 81003 URINALYSIS AUTO W/O SCOPE: CPT | Performed by: FAMILY MEDICINE

## 2020-07-27 PROCEDURE — 99214 OFFICE O/P EST MOD 30 MIN: CPT | Performed by: FAMILY MEDICINE

## 2020-07-27 PROCEDURE — 2022F DILAT RTA XM EVC RTNOPTHY: CPT | Performed by: FAMILY MEDICINE

## 2020-07-27 PROCEDURE — G8427 DOCREV CUR MEDS BY ELIG CLIN: HCPCS | Performed by: FAMILY MEDICINE

## 2020-07-27 RX ORDER — GLIPIZIDE 5 MG/1
5 TABLET ORAL 2 TIMES DAILY
Qty: 90 TABLET | Refills: 3 | Status: SHIPPED | OUTPATIENT
Start: 2020-07-27 | End: 2021-03-22

## 2020-07-27 RX ORDER — FEXOFENADINE HCL 180 MG/1
180 TABLET ORAL DAILY
Qty: 90 TABLET | Refills: 5 | Status: SHIPPED | OUTPATIENT
Start: 2020-07-27 | End: 2021-07-15

## 2020-07-27 RX ORDER — FLUTICASONE PROPIONATE 50 MCG
1 SPRAY, SUSPENSION (ML) NASAL 2 TIMES DAILY
Qty: 1 BOTTLE | Refills: 1 | Status: SHIPPED | OUTPATIENT
Start: 2020-07-27 | End: 2020-08-24

## 2020-07-27 RX ORDER — DOXYCYCLINE HYCLATE 100 MG
100 TABLET ORAL 2 TIMES DAILY
Qty: 14 TABLET | Refills: 0 | Status: SHIPPED | OUTPATIENT
Start: 2020-07-27 | End: 2022-01-17 | Stop reason: SDUPTHER

## 2020-07-27 RX ORDER — PRAVASTATIN SODIUM 10 MG
10 TABLET ORAL DAILY
Qty: 30 TABLET | Refills: 3 | Status: SHIPPED | OUTPATIENT
Start: 2020-07-27 | End: 2020-11-16 | Stop reason: SDUPTHER

## 2020-07-27 ASSESSMENT — ENCOUNTER SYMPTOMS
SHORTNESS OF BREATH: 0
CONSTIPATION: 0
COUGH: 0
RESPIRATORY NEGATIVE: 1
ABDOMINAL PAIN: 0
NAUSEA: 0
DIARRHEA: 0

## 2020-07-27 NOTE — PROGRESS NOTES
[facility] PHYSICIANS  [facility] FAMILY PHYSICIANS  [facility]rez [facility] [address].  [address] [address]  Dept: [phone]     2020   Chief Complaint   Patient presents with    Diabetes     HPI  Salome Murrieta (:  1975) is a 41 y.o. female was an established patient Dr. Sunni Ruffin. Patient has a history of diabetes, hyperlipidemia, fibromyalgia, vitamin D deficiency, morbid obesity, seasonal allergic rhinitis. Patient with a known seasonal allergy and recurrent sinusitis. She is currently on Allegra. She c/o sinus pressure, post nasal drip and non prod cough. Symptoms are seasonal. She reports worsening of symptoms. DIABETES MELLITUS- 8.2%  Patient has a  stable Diabetes Mellitus. Patient's recent hemoglobin A1c was 8.2% at her endocrinologist office. Lab Results   Component Value Date    LABA1C 6.9 2018   . Current therapy includes metformin. . Patient's dose was increased but unable to tolerate. Dose decreased to 500 mg. I will agg glipizide. Patient is responding poorly with this therapy. Patient reports home glucose monitoring as Stable readings. Patient denieshypoglycemia episodes such as{symptoms. Patient denies blurred vision. Endocrinologist . The patient has not seen an ophthalmologist with in one last year. The patient is  on ACE inhibitors. The patient has not a foot examination. The patient has not seen an podiatrist with in last year. Chepe Shipman is currently on pravastatin (Pravachol). Patient was originally on Lipitor. But patient stopped taking the medication due to some myalgias. Medication changed from Lipitor to pravastatin. Patient denies adverse reaction to this medication. Compliance with treatment thus far has been excellent. The patient is not known to have coexisting coronary artery disease.  The 10-year CVD risk score (D'Agostino, et al., 2008) is: 13.9%    Values used to calculate the score:      Age: 41 years      Sex: without esophagitis    Complete rotator cuff tear or rupture of right shoulder, not specified as traumatic    Nicotine dependence, cigarettes, uncomplicated    Other seasonal allergic rhinitis    Vitamin D deficiency    Pure hypercholesterolemia    Diabetes mellitus ([address]    Family history of malignant neoplasm of breast    Type 2 diabetes mellitus with diabetic polyneuropathy (HCC)    BMI 36.0-36.9,adult    Class 2 severe obesity due to excess calories with serious comorbidity and body mass index (BMI) of 36.0 to 36.9 in adult [facility])      Past Medical History:   Diagnosis Date    Abdominal pain     Antinuclear factor positive     Anxiety 1/16/2020    Arthritis     osteoarthritis    Autoimmune disease ([address]     unknown exactly, being worked up   [employer] severe obesity due to excess calories with serious comorbidity and body mass index (BMI) of 36.0 to 36.9 in adult ([address] 7/27/2020    Depression     Diabetes mellitus ([address]     Edema     Environmental allergies     Epigastric pain     Fibromyalgia     Gastritis     Gastro-esophageal reflux disease without esophagitis 10/6/2017    Hepatic steatosis     Liver lesion     Lumbago     Other seasonal allergic rhinitis 10/6/2017    Plantar fasciitis     Primary fibromyalgia syndrome     PTSD (post-traumatic stress disorder)     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[address]     Sjogren's disease ([address]     Type 2 diabetes mellitus with diabetic polyneuropathy ([facility] 5/24/2018      Past Surgical History:   Procedure Laterality Date    HYSTERECTOMY      partial hysterectomy    PAROTIDECTOMY Right     TONSILLECTOMY      UPPER GASTROINTESTINAL ENDOSCOPY  01/26/2016    chronic inactive gastritis    VARICOSE VEIN SURGERY Right      Family History   Problem Relation Age of Onset    Breast Cancer Mother         age 27   [place] Exon High Blood Pressure Mother     Diabetes Mother     High Blood Pressure Maternal Grandfather     Diabetes Maternal Grandfather      Current Outpatient Medications   Medication Sig Dispense Refill    glipiZIDE (GLUCOTROL) 5 MG tablet Take 1 tablet by mouth 2 times daily 90 tablet 3    pravastatin (PRAVACHOL) 10 MG tablet Take 1 tablet by mouth daily 30 tablet 3    fexofenadine (ALLEGRA) 180 MG tablet Take 1 tablet by mouth daily 90 tablet 5    fluticasone (FLONASE) 50 MCG/ACT nasal spray 1 spray by Nasal route 2 times daily 1 Bottle 1    doxycycline hyclate (VIBRA-TABS) 100 MG tablet Take 1 tablet by mouth 2 times daily for 7 days 14 tablet 0    melatonin 3 MG TABS tablet Take 1 tablet by mouth nightly as needed (sleep) 30 tablet 3    Elastic Bandages & Supports (AIRCAST SPORT ANKLE BRACE/RGHT) MISC Wear all the time 1 each 0    ibuprofen (ADVIL;MOTRIN) 600 MG tablet Take 1 tablet by mouth 4 times daily as needed for Pain 180 tablet 1    lidocaine (LIDODERM) 5 % Place 1 patch onto the skin daily 12 hours on, 12 hours off.  30 patch 0    omeprazole (PRILOSEC) 20 MG delayed release capsule TAKE 1 CAPSULE BY MOUTH ONCE DAILY AS NEEDED      TRUEPLUS LANCETS 28G MISC 1 (ONE) MISC MISC TWO TIMES DAILY      vitamin D (ERGOCALCIFEROL) 1.25 MG (91553 UT) CAPS capsule 1 (ONE) CAPSULE ONCE A WEEK      EPINEPHrine (EPIPEN) 0.3 MG/0.3ML SOAJ injection Use as directed 2 each 0    TRUE METRIX BLOOD GLUCOSE TEST strip PATIENT CHECKS SUGAR 2-3 TIMES DAILY AND PRN  3    chlorhexidine (PERIDEX) 0.12 % solution       metFORMIN (GLUCOPHAGE-XR) 500 MG extended release tablet Take 500 mg by mouth      cyclobenzaprine (FLEXERIL) 10 MG tablet Take 1 tablet by mouth 3 times daily as needed      aspirin (SM ASPIRIN ADULT LOW STRENGTH) 81 MG EC tablet Take 1 tablet by mouth daily      SM MUCUS RELIEF MAX STRENGTH 1200 MG TB12 TAKE 1 (ONE) TABLET EVERY TWELVE HOURS       Current Facility-Administered Medications   Medication Dose Route Frequency Provider Last Rate Last Dose    Tetanus-Diphth-Acell Pertussis (BOOSTRIX) injection 0.5 mL  0.5 mL Intramuscular Once times daily as needed for Pain Yes Chalino Rodriguez, APRN - CNP   lidocaine (LIDODERM) 5 % Place 1 patch onto the skin daily 12 hours on, 12 hours off. Yes Perez Amador MD   omeprazole (PRILOSEC) 20 MG delayed release capsule TAKE 1 CAPSULE BY MOUTH ONCE DAILY AS NEEDED Yes Historical Provider, MD   TRUEPLUS LANCETS 28G MISC 1 (ONE) MISC MISC TWO TIMES DAILY Yes Historical Provider, MD   vitamin D (ERGOCALCIFEROL) 1.25 MG (09630 UT) CAPS capsule 1 (ONE) CAPSULE ONCE A WEEK Yes Historical Provider, MD   EPINEPHrine (EPIPEN) 0.3 MG/0.3ML SOAJ injection Use as directed Yes Tacos Cho MD   TRUE METRIX BLOOD GLUCOSE TEST strip PATIENT CHECKS SUGAR 2-3 TIMES DAILY AND PRN Yes Historical Provider, MD   chlorhexidine (PERIDEX) 0.12 % solution  Yes Historical Provider, MD   metFORMIN (GLUCOPHAGE-XR) 500 MG extended release tablet Take 500 mg by mouth Yes Historical Provider, MD   cyclobenzaprine (FLEXERIL) 10 MG tablet Take 1 tablet by mouth 3 times daily as needed  Historical Provider, MD   aspirin ( ASPIRIN ADULT LOW STRENGTH) 81 MG EC tablet Take 1 tablet by mouth daily  Historical Provider, MD    MUCUS RELIEF MAX STRENGTH 1200 MG TB12 TAKE 1 (ONE) TABLET EVERY TWELVE HOURS  Historical Provider, MD   traZODone (DESYREL) 100 MG tablet trazodone 100 mg tablet  Historical Provider, MD      Social History     Tobacco Use    Smoking status: Current Every Day Smoker     Packs/day: 0.50     Years: 27.00     Pack years: 13.50     Types: Cigarettes    Smokeless tobacco: Never Used   Substance Use Topics    Alcohol use: No      Vitals:    07/27/20 0811   BP: 132/80   Pulse: 90   Temp: 97.8 °F (36.6 °C)   SpO2: 98%   Weight: 182 lb (82.6 kg)     Estimated body mass index is 35.54 kg/m² as calculated from the following:    Height as of 5/15/20: 5' (1.524 m). Weight as of this encounter: 182 lb (82.6 kg). Physical Exam  Vitals signs and nursing note reviewed.    Constitutional:       Appearance: She is 10/08/2019    CO2 24 10/08/2019    BUN 8 10/08/2019    CREATININE 0.69 10/08/2019    GLUCOSE 231 10/08/2019    CALCIUM 9.8 10/08/2019      Lab Results   Component Value Date    ALT 86 (H) 10/08/2019    AST 54 (H) 10/08/2019    ALKPHOS 123 (H) 10/08/2019    BILITOT 0.32 10/08/2019     Lab Results   Component Value Date    TSH 2.24 10/08/2019     Lab Results   Component Value Date    CHOL 193 10/08/2019    CHOL 190 04/23/2018     Lab Results   Component Value Date    TRIG 165 (H) 10/08/2019    TRIG 117 04/23/2018     Lab Results   Component Value Date    HDL 40 (L) 10/08/2019    HDL 36 (L) 04/23/2018     Lab Results   Component Value Date    LDLCHOLESTEROL 120 10/08/2019    LDLCHOLESTEROL 131 (H) 04/23/2018     Lab Results   Component Value Date    CHOLHDLRATIO 4.8 10/08/2019    CHOLHDLRATIO 5.3 (H) 04/23/2018     Lab Results   Component Value Date    LABA1C 6.9 06/11/2018      Lab Results   Component Value Date    XCQHKGIU59 540 10/08/2019     No results found for: FOLATE  Lab Results   Component Value Date    VITD25 25.9 (L) 10/08/2019     ASSESSMENT/PLAN:  1. Chronic pansinusitis  Worsening   Start Doxycycline  DISCUSSED AND ADVISED TO:  Rest.  Advised to increase fluid intake. Eat more fruits and vegetables. Take medications as discussed. Report for worsening symptoms.  - doxycycline hyclate (VIBRA-TABS) 100 MG tablet; Take 1 tablet by mouth 2 times daily for 7 days  Dispense: 14 tablet; Refill: 0    2. Type 2 diabetes mellitus without complication, without long-term current use of insulin (HCC)  Failure to Improve  Start glipizide  COntinue metformin  - POCT glycosylated hemoglobin (Hb A1C); Future  - Microalbumin, Ur; Future  - CBC Auto Differential; Future  - Urinalysis Reflex to Culture; Future  - glipiZIDE (GLUCOTROL) 5 MG tablet; Take 1 tablet by mouth 2 times daily  Dispense: 90 tablet; Refill: 3    3.  Mixed hyperlipidemia  Likely stable  Start Pravachol  Advised to decrease the consumption of red meats, fried foods, trans fats, sweets, sugary beverages. Advised to increase fish, vegetables, and fruits consumption. Advised to add fiber or OTC supplements in diet. Discussed weight loss which will result in improvement of lipids levels. Advised to increase daily physical activities and add regular exercises. - Comprehensive Metabolic Panel, Fasting; Future  - Lipid, Fasting; Future  - pravastatin (PRAVACHOL) 10 MG tablet; Take 1 tablet by mouth daily  Dispense: 30 tablet; Refill: 3    4. Primary fibromyalgia syndrome  Stable  COntinue to monitor  - TSH without Reflex; Future    5. Other seasonal allergic rhinitis  Failure to Improve  Start Flonase  Continue with the same therapy Allegra  ADVISED TO:  Avoid known allergens/irritants. Stop smoking or avoid second hand smoke. Stay hydrated. Report for worsening symptoms  - fexofenadine (ALLEGRA) 180 MG tablet; Take 1 tablet by mouth daily  Dispense: 90 tablet; Refill: 5  - fluticasone (FLONASE) 50 MCG/ACT nasal spray; 1 spray by Nasal route 2 times daily  Dispense: 1 Bottle; Refill: 1    6. Vitamin D deficiency  Start Vitamin D supplementation  DISCUSSED AND ADVISED TO:  Foods that contain a lot of vitamin D includes [place], tuna, and mackerel. Cheese, egg yolks, and beef liver have small amounts of vit D.  Milk, soy drinks, orange juice, yogurt, margarine, and some kinds of cereal have vitamin D added to them. Continue to use sunblock when out in the sun to prevent skin cancer.  - Vitamin D 25 Hydroxy; Future    7. Class 2 severe obesity due to excess calories with serious comorbidity and body mass index (BMI) of 36.0 to 36.9 in adult (HCC)  BMI increasing  DISCUSSED AND ADVISED TO:  Eat a low-fat and low carbohydrates diet. Avoid fried foods especially fast food. Choose healthier options for snacks. Have 5-6 servings of fruits and vegetables per day. Cut down on eating processed food.   Add 30 minutes to 1 hour aerobic exercise for 3-4 days a week.      8. Screening for viral disease  Recommended  - HIV Screen; Future  - Hepatitis B Surface Antigen; Future    9. Breast cancer screening by mammogram  Recommended  - ECHO DIGITAL SCREEN W OR WO CAD BILATERAL; Future     Controlled Substance Monitoring:  Acute and Chronic Pain Monitoring:   RX Monitoring 4/8/2020   Periodic Controlled Substance Monitoring No signs of potential drug abuse or diversion identified. Orders Placed This Encounter   Procedures    ECHO DIGITAL SCREEN W OR WO CAD BILATERAL     Standing Status:   Future     Standing Expiration Date:   9/26/2021     Order Specific Question:   Reason for exam:     Answer:   annual    Microalbumin, Ur     Standing Status:   Future     Standing Expiration Date:   7/26/2021    HIV Screen     Standing Status:   Future     Standing Expiration Date:   7/26/2021    Hepatitis B Surface Antigen     Standing Status:   Future     Standing Expiration Date:   7/26/2021    CBC Auto Differential     Standing Status:   Future     Standing Expiration Date:   7/27/2021    Comprehensive Metabolic Panel, Fasting     Standing Status:   Future     Standing Expiration Date:   7/27/2021    Lipid, Fasting     Standing Status:   Future     Standing Expiration Date:   7/27/2021    Vitamin D 25 Hydroxy     Standing Status:   Future     Standing Expiration Date:   7/27/2021    Urinalysis Reflex to Culture     Standing Status:   Future     Standing Expiration Date:   7/27/2021     Order Specific Question:   SPECIFY(EX-CATH,MIDSTREAM,CYSTO,ETC)?      Answer:   midstream    TSH without Reflex     Standing Status:   Future     Standing Expiration Date:   7/27/2021    POCT glycosylated hemoglobin (Hb A1C)     Standing Status:   Future     Standing Expiration Date:   7/26/2021     Orders Placed This Encounter   Medications    Tetanus-Diphth-Acell Pertussis (BOOSTRIX) injection 0.5 mL    glipiZIDE (GLUCOTROL) 5 MG tablet     Sig: Take 1 tablet by mouth 2 times daily Dispense:  90 tablet     Refill:  3    pravastatin (PRAVACHOL) 10 MG tablet     Sig: Take 1 tablet by mouth daily     Dispense:  30 tablet     Refill:  3    fexofenadine (ALLEGRA) 180 MG tablet     Sig: Take 1 tablet by mouth daily     Dispense:  90 tablet     Refill:  5    fluticasone (FLONASE) 50 MCG/ACT nasal spray     Si spray by Nasal route 2 times daily     Dispense:  1 Bottle     Refill:  1    doxycycline hyclate (VIBRA-TABS) 100 MG tablet     Sig: Take 1 tablet by mouth 2 times daily for 7 days     Dispense:  14 tablet     Refill:  0      Medications Discontinued During This Encounter   Medication Reason    atorvastatin (LIPITOR) 20 MG tablet Therapy completed    fexofenadine (ALLEGRA) 180 MG tablet REORDER    fluticasone (FLONASE) 50 MCG/ACT nasal spray REORDER    omeprazole (PRILOSEC OTC) 20 MG tablet Therapy completed    SENNA LAX 8.6 MG tablet LIST CLEANUP    tetracycline (ACHROMYCIN;SUMYCIN) 500 MG capsule LIST CLEANUP      Health Maintenance Due   Topic Date Due    Pneumococcal 0-64 years Vaccine (1 of 1 - PPSV23) 1982    Diabetic foot exam  1986    Diabetic retinal exam  1986    HIV screen  1991    Hepatitis B vaccine (1 of 3 - Risk 3-dose series) 1995    DTaP/Tdap/Td vaccine (1 - Tdap) 1995    Cervical cancer screen  1997    Breast cancer screen  2016    A1C test (Diabetic or Prediabetic)  2019      Return in about 3 months (around 10/27/2020) for Chronic conditions, Rev. results. Earleby Moritz received counseling on the following healthy behaviors: nutrition, exercise and medication adherence  Reviewed prior labs and health maintenance  Continue current medications, diet and exercise. Discussed use, benefit, and side effects of prescribed medications. Barriers to medication compliance addressed. Patient given educational materials - see patient instructions  Was a self-tracking handout given in paper form or via [employer]? Yes    Requested Prescriptions     Signed Prescriptions Disp Refills    glipiZIDE (GLUCOTROL) 5 MG tablet 90 tablet 3     Sig: Take 1 tablet by mouth 2 times daily    pravastatin (PRAVACHOL) 10 MG tablet 30 tablet 3     Sig: Take 1 tablet by mouth daily    fexofenadine (ALLEGRA) 180 MG tablet 90 tablet 5     Sig: Take 1 tablet by mouth daily    fluticasone (FLONASE) 50 MCG/ACT nasal spray 1 Bottle 1     Si spray by Nasal route 2 times daily    doxycycline hyclate (VIBRA-TABS) 100 MG tablet 14 tablet 0     Sig: Take 1 tablet by mouth 2 times daily for 7 days       All patient questions answered. Patient voiced understanding. Quality Measures    Body mass index is 35.54 kg/m². Elevated. Weight control planned discussed Healthy diet and regular exercise. BP: 132/80 Blood pressure is normal. Treatment plan consists of No treatment change needed. Lab Results   Component Value Date    LDLCHOLESTEROL 120 10/08/2019    (goal LDL reduction with dx if diabetes is 50% LDL reduction)      PHQ Scores 2020   PHQ2 Score 5 6 2   PHQ9 Score 12 21 2     Interpretation of Total Score Depression Severity: 1-4 = Minimal depression, 5-9 = Mild depression, 10-14 = Moderate depression, 15-19 = Moderately severe depression, 20-27 = Severe depression   This note was completed by using the assistance of a speech-recognition program. However, inadvertent computerized transcription errors may be present. Although every effort was made to ensure accuracy, no guarantees can be provided that every mistake has been identified and corrected by editing   An electronic signature was used to authenticate this note.   --DAV Tijerina - CNP on 2020 at 6:34 PM

## 2020-07-27 NOTE — PATIENT INSTRUCTIONS
Patient Education        Fibromyalgia: Care Instructions  Overview     Fibromyalgia is a painful condition that is not completely understood by medical experts. The cause of fibromyalgia is not known. It can make you feel tired and ache all over. It causes tender spots at specific points of the body that hurt only when you press on them. You may have trouble sleeping, as well as other symptoms. These problems can upset your work and home life. Symptoms tend to come and go, although they may never go away completely. Fibromyalgia does not harm your muscles, joints, or organs. Follow-up care is a key part of your treatment and safety. Be sure to make and go to all appointments, and call your doctor if you are having problems. It's also a good idea to know your test results and keep a list of the medicines you take. How can you care for yourself at home? · Exercise often. Walk, swim, or bike to help with pain and sleep problems and to make you feel better. · Try to get a good night's sleep. Go to bed and get up at the same time each day, whether you feel rested or not. Make sure you have a good mattress and pillow. · Reduce stress. Avoid things that cause you stress, if you can. If not, work at making them less stressful. Learn to use biofeedback, guided imagery, meditation, or other methods to relax. · Make healthy changes. Eat a balanced diet, quit smoking, and limit alcohol and caffeine. · Use a heating pad set on low or take warm baths or showers for pain. Using cold packs for up to 20 minutes at a time can also relieve pain. Put a thin cloth between the cold pack and your skin. A gentle massage might help too. · Be safe with medicines. Take your medicines exactly as prescribed. Call your doctor if you think you are having a problem with your medicine. Your doctor may talk to you about taking antidepressant medicines.  These medicines may improve sleep, relieve pain, and in some cases treat

## 2020-07-28 ENCOUNTER — TELEMEDICINE (OUTPATIENT)
Dept: PSYCHOLOGY | Age: 45
End: 2020-07-28
Payer: COMMERCIAL

## 2020-07-28 PROCEDURE — 90837 PSYTX W PT 60 MINUTES: CPT | Performed by: SOCIAL WORKER

## 2020-08-07 RX ORDER — IBUPROFEN 600 MG/1
600 TABLET ORAL 4 TIMES DAILY PRN
Qty: 180 TABLET | Refills: 1 | Status: SHIPPED | OUTPATIENT
Start: 2020-08-07 | End: 2020-10-30

## 2020-08-09 NOTE — PROGRESS NOTES
it is her step-father. Mp Lindsey was very emotional and tearful in sharing her feelings relating to the timing of giving up effort and energy into connecting with her real father, to then the very next day, receiving a phone call about her step-father. Mp Lindsey shared that her step-father passed away when she was only [age] and she wonders how life might have been different if he were still here. Previous Recommendations: Continue tele-health psychotherapy to address depression and anxiety. Continue to develop self-care routines. MENTAL STATUS EXAM  Mood was euthymic with calm affect. Suicidal ideation was denied. Homicidal ideation was denied. Hygiene was n/a. Dress was appropriate. Behavior was Within Normal Limits with No observation or self-report of difficulties ambulating. Attitude was Engageable. Eye-contact was good. Speech: rate - WNL, rhythm - WNL, volume - WNL. Verbalizations were coherent. Thought processes were intact and goal-oriented without evidence of delusions, hallucinations, obsessions, or dario; with moderate cognitive distortions. Associations were characterized by intact cognitive processes. Pt was oriented to person, place, time, and general circumstances;  recent:  good. Insight and judgment were estimated to be good, AEB, a good  understanding of cyclical maladaptive patterns, and the ability to use insight to inform behavior change. ASSESSMENT  Connie Phan presented to the appointment today for evaluation and treatment of symptoms of depression. She is currently deemed no risk to herself or others and meets criteria for Major Depressive Disorder. Mp Lindsey shared that she has finally made peace with not being apart of her father's side of the family.  Not being connected to her dad's family has been something that's bothered Mp Lindsey emotionally, often leaving her to feel she wasn't good enough and leading to internalizing negative thoughts and feelings impacting Chantal's self-view. [place] shared that she continues to work through depressive feelings and decreasing her anxiety. [place] was in agreement with continuing therapy, at this time we have discussed the goal of bi-weekly therapy sessions. PHQ Scores 5/20/2020 1/23/2020 5/24/2018   PHQ2 Score 5 6 2   PHQ9 Score 12 21 2     Interpretation of Total Score Depression Severity: 1-4 = Minimal depression, 5-9 = Mild depression, 10-14 = Moderate depression, 15-19 = Moderately severe depression, 20-27 = Severe depression    How often pt has had thoughts of death or hurting self (if PHQ positive for depression):       FRIEDA 7 SCORE 1/23/2020   FRIEDA-7 Total Score 21     Interpretation of FRIEDA-7 score: 5-9 = mild anxiety, 10-14 = moderate anxiety, 15+ = severe anxiety. Recommend referral to behavioral health for scores 10 or greater. DIAGNOSIS  Gage bishop was seen today for depression and anxiety. Diagnoses and all orders for this visit:    Major depressive disorder, recurrent episode, moderate (HCC)    Anxiety disorder, unspecified type        INTERVENTION  Discussed various factors related to the development and maintenance of  depression (including biological, cognitive, behavioral, and environmental factors), Trained in strategies for increasing balanced thinking, Provided education, Discussed self-care (sleep, nutrition, rewarding activities, social support, exercise), Supportive techniques and Emphasized self-care as important for managing overall health. Discussed letter writing activity - using this as a form of self-expression. Kemi Adrian was recommended to continue telehealth psychotherapy to address depressive and anxious symptoms. She was scheduled for a 2 week f/u appointment on 8/11 @ 11 AM. Encouraged pt to continue self-care initiatives. We discussed [place] writing a letter to her father to express her thoughts and feelings related to their relationship.      INTERACTIVE COMPLEXITY  Is interactive complexity present?   No  Reason:  N/A  Additional Supporting Information:  N/A       Electronically signed by Josey Best on 8/9/2020 at 4:08 PM

## 2020-08-11 ENCOUNTER — VIRTUAL VISIT (OUTPATIENT)
Dept: PSYCHOLOGY | Age: 45
End: 2020-08-11
Payer: COMMERCIAL

## 2020-08-11 PROCEDURE — 90834 PSYTX W PT 45 MINUTES: CPT | Performed by: SOCIAL WORKER

## 2020-08-11 NOTE — PROGRESS NOTES
ADULT BEHAVIORAL HEALTH FOLLOW UP  DONA Ohara  Licensed Independent          Visit Date: 8/11/2020   Time of appointment: 11:21 AM-12:10 PM  Time spent with Patient: 49 minutes. This is patient's 12th appointment. Patient Location: [facility]/Clinician Location: [address] [place] [address]   This was a telehealth visit. This virtual visit was conducted via interactive/real-time audio/video. Reason for Consult:  Depression and Anxiety     PCP:  DAV Cuevas CNP      Pt provided informed consent for the behavioral health program. Discussed with patient model of service to include the limits of confidentiality (i.e. abuse reporting, suicide intervention, etc.) and short-term intervention focused approach. Pt indicated understanding. Patient provided verbal consent to engage in telehealth psychotherapy. This visit was completed virtually using Doxy. me due to contact restrictions related to the COVID-19 pandemic. This session was held in a private space in patient's home in [facility]. Janes Xiao is a 41 y.o. female who presents for follow up of depression. Myriam Waldrop shared that the family has been very busy over the last couple weeks and that this has been overwhelming at times. She reported that they recently went camping and her  had a medical emergency while they were on the trip. Myriam Waldrop shared that she had to catch her  who almost fell into the fire pit while they were camping. She reported that he later went to the doctor after they returned home and was told he had a stroke. Myriam Waldrop shared how scary this incident was to witness and how the family has been dealing with so much over the last several months. She shared that her  is thankfully ok and that the family is doing well. She shared that she continues to work on decreasing her anxiety and focusing on calming behavior.  She admitted that she did not get a chance to write the letter to her step-father like she wanted, but she has been thinking about how to start this. Gage bishop shared she is focused on getting the kids ready to start their online school and is hopeful to take another trip soon to practice self-care. Previous Recommendations: Gage bishop was recommended to continue telehealth psychotherapy to address depressive and anxious symptoms. She was scheduled for a 2 week f/u appointment on 8/11 @ 11 AM. Encouraged pt to continue self-care initiatives. We discussed [place] writing a letter to her father to express her thoughts and feelings related to their relationship. MENTAL STATUS EXAM  Mood was euthymic with calm affect. Suicidal ideation was denied. Homicidal ideation was denied. Hygiene was n/a. Dress was appropriate. Behavior was Within Normal Limits with No observation or self-report of difficulties ambulating. Attitude was Engageable. Eye-contact was good. Speech: rate - WNL, rhythm - WNL, volume - WNL. Verbalizations were coherent. Thought processes were intact and goal-oriented without evidence of delusions, hallucinations, obsessions, or dario; with moderate cognitive distortions. Associations were characterized by intact cognitive processes. Pt was oriented to person, place, time, and general circumstances;  recent:  good. Insight and judgment were estimated to be good, AEB, a good  understanding of cyclical maladaptive patterns, and the ability to use insight to inform behavior change. ASSESSMENT  Briseyda Jacome presented to the appointment today for evaluation and treatment of symptoms of depression and anxiety. She is currently deemed no risk to herself or others and meets criteria for Major Depressive Disorder and anxiety disorder, unspecified type. [place] shared that she continues to work through depressive feelings and working on calming her thoughts through interventions we discussed for anxiety.  [place] was in agreement

## 2020-08-14 ENCOUNTER — APPOINTMENT (OUTPATIENT)
Dept: GENERAL RADIOLOGY | Age: 45
End: 2020-08-14
Payer: COMMERCIAL

## 2020-08-14 ENCOUNTER — HOSPITAL ENCOUNTER (EMERGENCY)
Age: 45
Discharge: HOME | End: 2020-08-14
Attending: EMERGENCY MEDICINE
Payer: COMMERCIAL

## 2020-08-14 ENCOUNTER — NURSE TRIAGE (OUTPATIENT)
Dept: OTHER | Facility: CLINIC | Age: 45
End: 2020-08-14

## 2020-08-14 VITALS
SYSTOLIC BLOOD PRESSURE: 127 MMHG | BODY MASS INDEX: 35.34 KG/M2 | RESPIRATION RATE: 18 BRPM | OXYGEN SATURATION: 98 % | HEIGHT: 60 IN | TEMPERATURE: 98.1 F | HEART RATE: 90 BPM | DIASTOLIC BLOOD PRESSURE: 74 MMHG | WEIGHT: 180 LBS

## 2020-08-14 PROCEDURE — 73610 X-RAY EXAM OF ANKLE: CPT

## 2020-08-14 PROCEDURE — 99283 EMERGENCY DEPT VISIT LOW MDM: CPT

## 2020-08-14 ASSESSMENT — PAIN DESCRIPTION - PAIN TYPE: TYPE: ACUTE PAIN

## 2020-08-14 ASSESSMENT — PAIN DESCRIPTION - LOCATION: LOCATION: ANKLE

## 2020-08-14 ASSESSMENT — ENCOUNTER SYMPTOMS
NAUSEA: 0
COUGH: 0
SORE THROAT: 0
VOMITING: 0
SHORTNESS OF BREATH: 0
ABDOMINAL PAIN: 0

## 2020-08-14 ASSESSMENT — PAIN SCALES - GENERAL: PAINLEVEL_OUTOF10: 8

## 2020-08-14 ASSESSMENT — PAIN DESCRIPTION - ORIENTATION: ORIENTATION: RIGHT

## 2020-08-14 NOTE — ED PROVIDER NOTES
[address] ED  eMERGENCY dEPARTMENT eNCOUnter   Attending Attestation     Pt Name: Colin Vyas  MRN: [id number]  [place] 1975  Date of evaluation: 8/14/20       Colin Vyas is a 41 y.o. female who presents with Ankle Pain (problems since Mother's day. splint not placed d/t spouse admitted for CVA)      History:   Patient has had chronic ankle pain had torn ligaments with avulsion fractures was supposed to get casted this past week due to failed healing from the splint but was unable to make the appointment. No new injury. Exam: Vitals:   Vitals:    08/14/20 1744   BP: 127/74   Pulse: 90   Resp: 18   Temp: 98.1 °F (36.7 °C)   TempSrc: Oral   SpO2: 98%   Weight: 180 lb (81.6 kg)   Height: 5' (1.524 m)         I performed a history and physical examination of the patient and discussed management with the resident. I reviewed the residents note and agree with the documented findings and plan of care. Any areas of disagreement are noted on the chart. I was personally present for the key portions of any procedures. I have documented in the chart those procedures where I was not present during the key portions. I have personally reviewed all images and agree with the resident's interpretation. I have reviewed the emergency nurses triage note. I agree with the chief complaint, past medical history, past surgical history, allergies, medications, social and family history as documented unless otherwise noted below. Documentation of the HPI, Physical Exam and Medical Decision Making performed by medical students or scribes is based on my personal performance of the HPI, PE and MDM. I personally evaluated and examined the patient in conjunction with the APC and agree with the assessment, treatment plan, and disposition of the patient as recorded by the APC. Additional findings are as noted.     Reyna [place] MD  Attending Emergency  Physician             Junie Prince MD  08/14/20 0875

## 2020-08-14 NOTE — TELEPHONE ENCOUNTER
Reason for Disposition   Followed an ankle or foot injury   Can't stand (bear weight) or walk (e.g., 4 steps)    Answer Assessment - Initial Assessment Questions  1. ONSET: \"When did the pain start?\"       1600 8/14/2020  2. LOCATION: \"Where is the pain located? \"       Right ankle  3. PAIN: \"How bad is the pain? \"    (Scale 1-10; or mild, moderate, severe)    -  MILD (1-3): doesn't interfere with normal activities     -  MODERATE (4-7): interferes with normal activities (e.g., work or school) or awakens from sleep, limping     -  SEVERE (8-10): excruciating pain, unable to do any normal activities, unable to walk      8/10  4. WORK OR EXERCISE: \"Has there been any recent work or exercise that involved this part of the body? \"       no  5. CAUSE: \"What do you think is causing the foot pain? \"      When I hurt it on mothers day  6. OTHER SYMPTOMS: \"Do you have any other symptoms? \" (e.g., leg pain, rash, fever, numbness)      no  7. PREGNANCY: \"Is there any chance you are pregnant? \" \"When was your last menstrual period? \"      n/a    Answer Assessment - Initial Assessment Questions  1. MECHANISM: \"How did the injury happen? \" (e.g., twisting injury, direct blow)       Twisted it on mothers day  2. ONSET: \"When did the injury happen? \" (Minutes or hours ago)       On mothers day  3. LOCATION: \"Where is the injury located? \"       Right ankle  4. APPEARANCE of INJURY: \"What does the injury look like? \"       Swollen   5. WEIGHT-BEARING: \"Can you put weight on that foot? \" \"Can you walk (four steps or more)? \"        no  6. SIZE: For cuts, bruises, or swelling, ask: \"How large is it? \" (e.g., inches or centimeters;  entire joint)       It is stuck  7. PAIN: \"Is there pain? \" If so, ask: \"How bad is the pain? \"    (e.g., Scale 1-10; or mild, moderate, severe)      8/10  8. TETANUS: For any breaks in the skin, ask: \"When was the last tetanus booster? \"      no  9. OTHER SYMPTOMS: \"Do you have any other symptoms? \"       no  10. PREGNANCY: \"Is there any chance you are pregnant? \" \"When was your last menstrual period? \"        n/a    Protocols used: FOOT PAIN-ADULT-[place], ANKLE AND FOOT INJURY-ADULT-[place]    Caller states she hurt her Right ankle on Mother's Day. Caller was supposed to go back to dr and missed appointment because of  going to ED. Caller states foot is turned to the side and is 8/10. Caller is unable to walk on it. Recommendation Go to ED/UCC.

## 2020-08-14 NOTE — ED PROVIDER NOTES
[address]  Emergency Department Encounter  EmergencyMedicine Resident     Pt Name:Chantal Gonzalez  MRN: [id number]  [place] 1975  Date of evaluation: 8/14/20  PCP:  DAV Nicolas CNP    CHIEF COMPLAINT       Chief Complaint   Patient presents with    Ankle Pain     problems since Mother's day. splint not placed d/t spouse admitted for CVA       HISTORY OF PRESENT ILLNESS  (Location/Symptom, Timing/Onset, Context/Setting, Quality, Duration, Modifying Factors, Severity.)      Neeraj Schneider is a 41 y.o. female who presents to the emergency department with right ankle acute on chronic pain. Patient injured her ankle via rolling in May and was evaluated by podiatry since that time. She has an ankle brace and a boot that she wears occasionally at home and has crutches but does not know how to use them. Today she was sitting on the couch with her legs up by her side and attempted to stand up and found she could not bear weight on her right foot. She would have gone to podiatry clinic but they are closed after 1 PM so she came to the emergency department for evaluation. She denies numbness or tingling to the area, abrasion or other injury, and denies other symptoms at this time. Purportedly was going to have a splint applied versus cast at the podiatry clinic last week but could not get in due to another family member having a medical emergency.     PAST MEDICAL / SURGICAL / SOCIAL / FAMILY HISTORY      has a past medical history of Abdominal pain, Antinuclear factor positive, Anxiety, Arthritis, Autoimmune disease ([address] Class 2 severe obesity due to excess calories with serious comorbidity and body mass index (BMI) of 36.0 to 36.9 in [facility]), Depression, Diabetes mellitus ([address] Edema, Environmental allergies, Epigastric pain, Fibromyalgia, Gastritis, Gastro-esophageal reflux disease without esophagitis, Hepatic steatosis, Liver lesion, Lumbago, Other seasonal allergic rhinitis, Plantar fasciitis, Primary fibromyalgia syndrome, PTSD (post-traumatic stress disorder), 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 has a past surgical history that includes Parotidectomy (Right); Tonsillectomy; Hysterectomy; Upper gastrointestinal endoscopy (01/26/2016); and Varicose vein surgery (Right).     Social History     Socioeconomic History    Marital status:      Spouse name: Not on file    Number of children: Not on file    Years of education: Not on file    Highest education level: Not on file   Occupational History    Not on file   Social Needs    Financial resource strain: Not on file    Food insecurity     Worry: Not on file     Inability: Not on file    Transportation needs     Medical: Not on file     Non-medical: Not on file   Tobacco Use    Smoking status: Current Every Day Smoker     Packs/day: 0.50     Years: 27.00     Pack years: 13.50     Types: Cigarettes    Smokeless tobacco: Never Used   Substance and Sexual Activity    Alcohol use: No    Drug use: No    Sexual activity: Not on file   Lifestyle    Physical activity     Days per week: Not on file     Minutes per session: Not on file    Stress: Not on file   Relationships    Social connections     Talks on phone: Not on file     Gets together: Not on file     Attends Sabianist service: Not on file     Active member of club or organization: Not on file     Attends meetings of clubs or organizations: Not on file     Relationship status: Not on file    Intimate partner violence     Fear of current or ex partner: Not on file     Emotionally abused: Not on file     Physically abused: Not on file     Forced sexual activity: Not on file   Other Topics Concern    Not on file   Social History Narrative    Not on file       Family History   Problem Relation Age of Onset    Breast Cancer Mother         age 27   [facility] High Blood Pressure Mother     Diabetes Mother     High Blood Pressure Maternal Grandfather     Diabetes Maternal Grandfather        Allergies:  Morphine; Penicillins; Amoxicillin; Hydrocodone-acetaminophen; Oxycodone-acetaminophen; Percocet [oxycodone-acetaminophen]; Trazodone hcl; Ultram [tramadol]; and Vicodin [hydrocodone-acetaminophen]    Home Medications:  Prior to Admission medications    Medication Sig Start Date End Date Taking?  Authorizing Provider   ibuprofen (ADVIL;MOTRIN) 600 MG tablet TAKE 1 TABLET BY MOUTH 4 TIMES DAILY AS NEEDED FOR PAIN 8/7/20 11/5/20  DAV Cuevas CNP   glipiZIDE (GLUCOTROL) 5 MG tablet Take 1 tablet by mouth 2 times daily 7/27/20   DAV Cuevas CNP   pravastatin (PRAVACHOL) 10 MG tablet Take 1 tablet by mouth daily 7/27/20 8/26/20  DAV Cuevas CNP   fexofenadine (ALLEGRA) 180 MG tablet Take 1 tablet by mouth daily 7/27/20   DAV Cuevas CNP   fluticasone (FLONASE) 50 MCG/ACT nasal spray 1 spray by Nasal route 2 times daily 7/27/20   DAV Cuevas CNP   melatonin 3 MG TABS tablet Take 1 tablet by mouth nightly as needed (sleep) 5/18/20   DAV Granados NP   cyclobenzaprine (FLEXERIL) 10 MG tablet Take 1 tablet by mouth 3 times daily as needed    Historical Provider, MD   aspirin ( ASPIRIN ADULT LOW STRENGTH) 81 MG EC tablet Take 1 tablet by mouth daily 1/7/20   Historical Provider, MD   Elastic Bandages & Supports (AIRCAST SPORT ANKLE BRACE/RGHT) MISC Wear all the time 5/15/20   DAV Cuevas CNP   lidocaine (LIDODERM) 5 % Place 1 patch onto the skin daily 12 hours on, 12 hours off. 1/24/20   Stanley Cordero MD   omeprazole (PRILOSEC) 20 MG delayed release capsule TAKE 1 CAPSULE BY MOUTH ONCE DAILY AS NEEDED 1/6/20   Historical Provider, MD   TRUEPLUS LANCETS 28G MISC 1 (ONE) MISC MISC TWO TIMES DAILY 1/14/20   Historical Provider, MD   SM MUCUS RELIEF MAX STRENGTH 1200 MG TB12 TAKE 1 (ONE) TABLET EVERY TWELVE HOURS and state organizations. These policies and algorithms were followed during the patient's care in the ED.     (Please note that portions of thisnote were completed with a voice recognition program.  Efforts were made to edit the dictations but occasionally words are mis-transcribed.)        Irene Frey MD  Resident  08/14/20 0179

## 2020-08-17 ENCOUNTER — TELEPHONE (OUTPATIENT)
Dept: FAMILY MEDICINE CLINIC | Age: 45
End: 2020-08-17

## 2020-08-17 NOTE — TELEPHONE ENCOUNTER
[facility]) ED Follow up Call    Reason for ED visit:           Jeannine Garcia , this is Snowdedra Malloy from Dr. Torsten Rodriguez's office, just calling to see how you are doing after your recent ED visit. Did you receive discharge instructions? Yes  Do you understand the discharge instructions? Yes  Did the ED give you any new prescriptions? Yes  Were you able to fill your prescriptions? Yes      Do you have one of our red, yellow and green  Zone sheets that help you to determine when you should go to the ED? Yes      Do you need or want to make a follow up appt with your PCP? No    Do you have any further needs in the home i.e. Equipment?   No        FU appts/Provider:    Future Appointments   Date Time Provider Izabel Nelson   8/31/2020 11:00 AM DONA Love psych [facility]   9/10/2020  6:00 PM [facility] DIAG MAMMO RM [facility]MO [facility]   11/30/2020  8:00 AM Chalino Rodriguez, APRN - CNP fp sc TOP

## 2020-08-24 RX ORDER — FLUTICASONE PROPIONATE 50 MCG
SPRAY, SUSPENSION (ML) NASAL
Qty: 16 G | Refills: 2 | Status: SHIPPED | OUTPATIENT
Start: 2020-08-24 | End: 2020-11-17

## 2020-08-31 ENCOUNTER — TELEMEDICINE (OUTPATIENT)
Dept: PSYCHOLOGY | Age: 45
End: 2020-08-31
Payer: COMMERCIAL

## 2020-08-31 PROCEDURE — 90832 PSYTX W PT 30 MINUTES: CPT | Performed by: SOCIAL WORKER

## 2020-08-31 NOTE — PROGRESS NOTES
ADULT BEHAVIORAL HEALTH FOLLOW UP  DONA Wright  Licensed Independent          Visit Date: 8/31/2020   Time of appointment: 11:28 AM  Time spent with Patient: 27 minutes. This is patient's 13th appointment. Patient Location: [facility]/Clinician Location: [address] [place] [address]   This was a telehealth visit. This virtual visit was conducted via interactive/real-time audio/video. Reason for Consult:  Anxiety and Depression     PCP:  DAV Cuevas CNP      Pt provided informed consent for the behavioral health program. Discussed with patient model of service to include the limits of confidentiality (i.e. abuse reporting, suicide intervention, etc.) and short-term intervention focused approach. Pt indicated understanding. Patient provided verbal consent to engage in telehealth psychotherapy. This visit was completed virtually using Doxy. me due to contact restrictions related to the COVID-19 pandemic. This session was held in a private space in patient's home in [facility]. [facility] 41 y.o. female who presents for follow up of depression and anxiety. Colton Natarajan shared that she has been feeling pretty good overall, she reported that she has been keeping herself busy, as the kids have recently started home school. She reported that she has been working hard on helping her nephew improve behavior and academics, so she allowed her grand daughter to also join their home in completing school work, in hopes that the grand daughter would help her nephew along. Colton Natarajan shared to her surprise her nephew ended up being the leader in the school work and assisting her grand daughter. Colton Natarajan elaborated on how her nephew is excelling and was completing a higher grade level of work through a computer game. Colton Natarajan shared that since her 's episode and health condition, the family has moved to a diet to incorporate healthier eating habits. She shared that she has lost 10 pounds since starting this and is happy about it. Vinod Cho reported that she did find out additional information regarding her ankle from twisting it several months ago. She shared that she is unable to have it casted but will be completing physical therapy as well as some other interventions. She is looking forward to the therapy for her ankle to get better. Vinod Cho shared that she is continuing to work on decreasing stress and anxiety; she identified time management as an ongoing challenge, admitting that although she has a planner she has difficulty sticking to her plan. Previous Recommendations:   Vinod Cho was recommended to continue psychotherapy at this time. 1. F/u appointment scheduled for 8/31 @ 11 AM   2. Begin letter writing activity as discussed in previous session    [address] [facility] was euthymic with calm affect. Suicidal ideation was denied. Homicidal ideation was denied. Hygiene was n/a. Dress was appropriate. Behavior was Within Normal Limits with No observation or self-report of difficulties ambulating. Attitude was Engageable. Eye-contact was good. Speech: rate - WNL, rhythm - WNL, volume - WNL. Verbalizations were coherent. Thought processes were intact and goal-oriented without evidence of delusions, hallucinations, obsessions, or dario; with moderate cognitive distortions. Associations were characterized by intact cognitive processes. Pt was oriented to person, place, time, and general circumstances;  recent:  good. Insight and judgment were estimated to be good, AEB, a good  understanding of cyclical maladaptive patterns, and the ability to use insight to inform behavior change. ASSESSMENT  Tiff Graff presented to the appointment today for evaluation and treatment of symptoms of depression and anxiety.   She is currently deemed no risk to herself or others and meets criteria for Major Depressive Disorder and anxiety disorder, unspecified type. Crow Roberson identified time management as being difficult, she admitted that she often plans for several tasks but has a hard time completing everything, often becoming distracted. We discussed skills to improve time management and discussed overall mood. Crow Roberson also shared she needs to return to practicing self-care, we identified how Crow Roberson can use time management skills to ensure she is having time available for self-care. Crow Roberson is looking forward to camping with her family and her . Crow Roberson was in agreement with continuing therapy, she was scheduled for a follow-up appointment. PHQ Scores 5/20/2020 1/23/2020 5/24/2018   PHQ2 Score 5 6 2   PHQ9 Score 12 21 2     Interpretation of Total Score Depression Severity: 1-4 = Minimal depression, 5-9 = Mild depression, 10-14 = Moderate depression, 15-19 = Moderately severe depression, 20-27 = Severe depression    How often pt has had thoughts of death or hurting self (if PHQ positive for depression):       FRIEDA 7 SCORE 1/23/2020   FRIEDA-7 Total Score 21     Interpretation of FRIEDA-7 score: 5-9 = mild anxiety, 10-14 = moderate anxiety, 15+ = severe anxiety. Recommend referral to behavioral health for scores 10 or greater. DIAGNOSIS  Crow Roberson was seen today for anxiety and depression. Diagnoses and all orders for this visit:    Major depressive disorder, recurrent episode, moderate (HCC)    Anxiety disorder, unspecified type        INTERVENTION  Discussed various factors related to the development and maintenance of  depression (including biological, cognitive, behavioral, and environmental factors), Trained in strategies for increasing balanced thinking, Provided education, Discussed self-care (sleep, nutrition, rewarding activities, social support, exercise), Supportive techniques and Emphasized self-care as important for managing overall health.  Discussed recent anxiety; identified approximately 2x since last visit; she shared she could

## 2020-10-30 RX ORDER — IBUPROFEN 600 MG/1
600 TABLET ORAL 4 TIMES DAILY PRN
Qty: 180 TABLET | Refills: 1 | Status: SHIPPED | OUTPATIENT
Start: 2020-10-30 | End: 2020-12-27

## 2020-10-30 NOTE — TELEPHONE ENCOUNTER
I will refill medications this time. Patient needs to get blood work done and set up a follow up appointment before next refill. Otherwise, we won't refill medications if blood work not done. Thanks.

## 2020-10-30 NOTE — TELEPHONE ENCOUNTER
Please Approve or Refuse.   Send to Pharmacy per Pt's Request:      Next Visit Date:  11/30/2020   Last Visit Date: 7/27/2020    Hemoglobin A1C (%)   Date Value   06/11/2018 6.9   03/06/2018 7.4             ( goal A1C is < 7)   BP Readings from Last 3 Encounters:   08/14/20 127/74   07/27/20 132/80   05/13/20 119/85          (goal 120/80)  BUN   Date Value Ref Range Status   10/08/2019 8 6 - 20 mg/dL Final     CREATININE   Date Value Ref Range Status   10/08/2019 0.69 0.50 - 0.90 mg/dL Final     Potassium   Date Value Ref Range Status   10/08/2019 4.3 3.7 - 5.3 mmol/L Final

## 2020-11-10 ENCOUNTER — VIRTUAL VISIT (OUTPATIENT)
Dept: PSYCHOLOGY | Age: 45
End: 2020-11-10
Payer: COMMERCIAL

## 2020-11-10 PROCEDURE — 90837 PSYTX W PT 60 MINUTES: CPT | Performed by: SOCIAL WORKER

## 2020-11-16 RX ORDER — PRAVASTATIN SODIUM 10 MG
10 TABLET ORAL DAILY
Qty: 30 TABLET | Refills: 3 | Status: SHIPPED | OUTPATIENT
Start: 2020-11-16 | End: 2021-03-08

## 2020-11-17 RX ORDER — FLUTICASONE PROPIONATE 50 MCG
SPRAY, SUSPENSION (ML) NASAL
Qty: 16 G | Refills: 2 | Status: SHIPPED | OUTPATIENT
Start: 2020-11-17 | End: 2021-03-08

## 2020-11-23 NOTE — PROGRESS NOTES
also been anxiety provoking for her. Previous Recommendations: Denise Kirby was recommended to continue engaging in psychotherapy with [facility]. Patient was scheduled for next appointment on Monday 9/14 @ 2:00 PM. Patient is working on time management skills and decreasing anxiety. Lianet Man stated she has started writing the letter to her father that we discussed at last session, she is continuing to work on this activity. MENTAL STATUS EXAM  Mood was within normal limits with calm affect. Suicidal ideation was denied. Homicidal ideation was denied. Hygiene was n/a. Dress was appropriate. Behavior was Within Normal Limits with No observation or self-report of difficulties ambulating. Attitude was Engageable. Eye-contact was good. Speech: rate - WNL, rhythm - WNL, volume - WNL. Verbalizations were coherent. Thought processes were intact and goal-oriented without evidence of delusions, hallucinations, obsessions, or dario; with moderate cognitive distortions. Associations were characterized by intact cognitive processes. Pt was oriented to person, place, time, and general circumstances;  recent:  good. Insight and judgment were estimated to be good, AEB, a good  understanding of cyclical maladaptive patterns, and the ability to use insight to inform behavior change. ASSESSMENT  Denise Kirby presented to the appointment today for evaluation and treatment of symptoms of depression and anxiety. She is currently deemed no risk to herself or others and meets criteria for Major Depressive Disorder and anxiety disorder, unspecified type. [place] Man shared that she has been facing multiple stressors at home while also starting a new job. Although she has been wanting to work for some time, she admitted that coping with everything has posed to be overwhelming at times. Lianet Man is still motivated to continue improving her mental health and learning tools to cope with thoughts and feelings.  Lianet Man was in agreement with continuing therapy, she was scheduled for a follow-up appointment. PHQ Scores 5/20/2020 1/23/2020 5/24/2018   PHQ2 Score 5 6 2   PHQ9 Score 12 21 2     Interpretation of Total Score Depression Severity: 1-4 = Minimal depression, 5-9 = Mild depression, 10-14 = Moderate depression, 15-19 = Moderately severe depression, 20-27 = Severe depression    How often pt has had thoughts of death or hurting self (if PHQ positive for depression):       FRIEDA 7 SCORE 1/23/2020   FRIEDA-7 Total Score 21     Interpretation of FRIEDA-7 score: 5-9 = mild anxiety, 10-14 = moderate anxiety, 15+ = severe anxiety. Recommend referral to behavioral health for scores 10 or greater. DIAGNOSIS  Ayanna Young was seen today for depression and anxiety. Diagnoses and all orders for this visit:    Major depressive disorder, recurrent episode, moderate (HCC)    Anxiety disorder, unspecified type        INTERVENTION  Discussed various factors related to the development and maintenance of  depression (including biological, cognitive, behavioral, and environmental factors), Trained in strategies for increasing balanced thinking, Provided education, Discussed self-care (sleep, nutrition, rewarding activities, social support, exercise), Supportive techniques, Emphasized self-care as important for managing overall health and Identified maladaptive thoughts. Nancy Davison was recommended to re-engage in psychotherapy with [facility] to address depressive and anxious symptoms. INTERACTIVE COMPLEXITY  Is interactive complexity present?   No  Reason:  N/A  Additional Supporting Information:  N/A       Electronically signed by DONA Braun on 11/23/2020 at 10:49 AM

## 2020-12-22 ENCOUNTER — TELEPHONE (OUTPATIENT)
Dept: FAMILY MEDICINE CLINIC | Age: 45
End: 2020-12-22

## 2020-12-22 NOTE — TELEPHONE ENCOUNTER
Pt called and asked for a prescription renewal for her handicap sticker. Pt would like a call back with an update.      Pt contact: [phone]

## 2020-12-27 RX ORDER — IBUPROFEN 600 MG/1
600 TABLET ORAL 4 TIMES DAILY PRN
Qty: 180 TABLET | Refills: 1 | Status: SHIPPED | OUTPATIENT
Start: 2020-12-27 | End: 2021-06-14 | Stop reason: ALTCHOICE

## 2020-12-27 NOTE — TELEPHONE ENCOUNTER
Please Approve or Refuse.   Send to Pharmacy per Pt's Request:      Next Visit Date:  1/5/2021   Last Visit Date: 7/27/2020    Hemoglobin A1C (%)   Date Value   06/11/2018 6.9   03/06/2018 7.4             ( goal A1C is < 7)   BP Readings from Last 3 Encounters:   08/14/20 127/74   07/27/20 132/80   05/13/20 119/85          (goal 120/80)  BUN   Date Value Ref Range Status   10/08/2019 8 6 - 20 mg/dL Final     CREATININE   Date Value Ref Range Status   10/08/2019 0.69 0.50 - 0.90 mg/dL Final     Potassium   Date Value Ref Range Status   10/08/2019 4.3 3.7 - 5.3 mmol/L Final

## 2020-12-31 ENCOUNTER — VIRTUAL VISIT (OUTPATIENT)
Dept: PSYCHOLOGY | Age: 45
End: 2020-12-31
Payer: COMMERCIAL

## 2020-12-31 DIAGNOSIS — F33.1 MAJOR DEPRESSIVE DISORDER, RECURRENT EPISODE, MODERATE (HCC): Primary | ICD-10-CM

## 2020-12-31 PROCEDURE — 4004F PT TOBACCO SCREEN RCVD TLK: CPT | Performed by: SOCIAL WORKER

## 2020-12-31 PROCEDURE — 90834 PSYTX W PT 45 MINUTES: CPT | Performed by: SOCIAL WORKER

## 2021-01-13 NOTE — PROGRESS NOTES
ADULT BEHAVIORAL HEALTH FOLLOW UP  DONA Silverman  Licensed Independent          Visit Date: 12/31/2020   Time of appointment: 12:03 PM    Time spent with Patient: 40 minutes. This is patient's 15th appointment. Patient Location: [facility]/Clinician Location: Home office; [facility]   This was a telehealth visit. This virtual visit was conducted via interactive/real-time audio/video. Reason for Consult:  Depression     PCP:  DAV Cuevas CNP      Pt provided informed consent for the behavioral health program. Discussed with patient model of service to include the limits of confidentiality (i.e. abuse reporting, suicide intervention, etc.) and short-term intervention focused approach. Pt indicated understanding. Patient provided verbal consent to engage in telehealth psychotherapy. This visit was completed virtually using Doxy. me due to contact restrictions related to the COVID-19 pandemic. This session was held in a private space in patient's home in [facility]. Murray Nunes is a 41 y.o. female who presents for follow up of depression and anxiety. Joey Dumont reported that she had gone through a lot emotionally over the last several months. She reported that she has disconnected from many, if not most of her family members due to them hurting her emotionally. She explained how she has learned to establish boundaries to protect her feelings, as well as developed coping skills to address her emotions. Joey Dumont elaborated that the family has gone through a health scare with her  who was returning to the doctor due to concern for cancer returning. Joey Dumont admitted that through therapy she has been able to obtain peace and acceptance with the way some of her relationships have turned out. She is able to identify her self-worth and identify how she can maintain her own feelings by practicing regular self-care. Previous Recommendations: [place] was recommended to re-engage in psychotherapy with [address] [place] [address] to address depressive and anxious symptoms. MENTAL STATUS EXAM  Mood was euthymic with calm affect. Suicidal ideation was denied. Homicidal ideation was denied. Hygiene was unable to assess. Dress was appropriate. Behavior was Within Normal Limits with No observation or self-report of difficulties ambulating. Attitude was Engageable. Eye-contact was good. Speech: rate - WNL, rhythm - WNL, volume - WNL. Verbalizations were coherent. Thought processes were intact and goal-oriented without evidence of delusions, hallucinations, obsessions, or dario; with moderate cognitive distortions. Associations were characterized by intact cognitive processes. Pt was oriented to person, place, time, and general circumstances;  recent:  good. Insight and judgment were estimated to be good, AEB, a good  understanding of cyclical maladaptive patterns, and the ability to use insight to inform behavior change. ASSESSMENT  Ash Karimi presented to the appointment today for evaluation and treatment of symptoms of depression and anxiety. She is currently deemed no risk to herself or others and meets criteria for Major Depressive Disorder and anxiety disorder, unspecified type. [place] expressed significant progress in her symptoms and identified learning more about her self-care needs throughout meeting with [address] [place] [address]. [place] and I discussed [place] considering meeting with a trauma specialist to establish for ongoing therapy. [place] was in agreement with considering this.      PHQ Scores 5/20/2020 1/23/2020 5/24/2018   PHQ2 Score 5 6 2   PHQ9 Score 12 21 2     Interpretation of Total Score Depression Severity: 1-4 = Minimal depression, 5-9 = Mild depression, 10-14 = Moderate depression, 15-19 = Moderately severe depression, 20-27 = Severe depression How often pt has had thoughts of death or hurting self (if PHQ positive for depression):       FRIEDA 7 SCORE 1/23/2020   FRIEDA-7 Total Score 21     Interpretation of FRIEDA-7 score: 5-9 = mild anxiety, 10-14 = moderate anxiety, 15+ = severe anxiety. Recommend referral to behavioral health for scores 10 or greater. DIAGNOSIS  Mary Cesar was seen today for depression. Diagnoses and all orders for this visit:    Major depressive disorder, recurrent episode, moderate ([address]        INTERVENTION  Discussed and set plan for behavioral activation, Discussed potential treatments for  depression, Provided education, Discussed self-care (sleep, nutrition, rewarding activities, social support, exercise), Supportive techniques, Emphasized self-care as important for managing overall health, Collaborative treatment planning,Clarified role of [facility] in primary care,Recommended that pt establish with a mental health clinician with whom they can meet regularly for psychotherapy services and Reviewed options for identifying appropriate providers. Judson Lynne was recommended to consider establishing with an ongoing mental health clinician - encouraged Mary Cesar to consider meeting with a trauma specialist as Mary Cesar has achieved her goals of stabilizing mood and learning coping mechanisms for stress during visits with [facility]. INTERACTIVE COMPLEXITY  Is interactive complexity present?   No  Reason:  N/A  Additional Supporting Information:  N/A       Electronically signed by DONA Cantu on 1/14/2021 at 9:45 PM

## 2021-02-22 DIAGNOSIS — S99.811D: ICD-10-CM

## 2021-02-22 DIAGNOSIS — S93.491D SPRAIN OF OTHER LIGAMENT OF RIGHT ANKLE, SUBSEQUENT ENCOUNTER: ICD-10-CM

## 2021-02-22 RX ORDER — IBUPROFEN 600 MG/1
600 TABLET ORAL 4 TIMES DAILY PRN
Qty: 180 TABLET | Refills: 1 | OUTPATIENT
Start: 2021-02-22 | End: 2021-05-23

## 2021-03-01 ENCOUNTER — TELEPHONE (OUTPATIENT)
Dept: FAMILY MEDICINE CLINIC | Age: 46
End: 2021-03-01

## 2021-03-08 DIAGNOSIS — E78.2 MIXED HYPERLIPIDEMIA: ICD-10-CM

## 2021-03-08 DIAGNOSIS — J30.2 OTHER SEASONAL ALLERGIC RHINITIS: ICD-10-CM

## 2021-03-08 RX ORDER — PRAVASTATIN SODIUM 10 MG
10 TABLET ORAL DAILY
Qty: 30 TABLET | Refills: 3 | Status: SHIPPED | OUTPATIENT
Start: 2021-03-08 | End: 2021-05-19 | Stop reason: SDUPTHER

## 2021-03-08 RX ORDER — FLUTICASONE PROPIONATE 50 MCG
SPRAY, SUSPENSION (ML) NASAL
Qty: 16 G | Refills: 2 | Status: SHIPPED | OUTPATIENT
Start: 2021-03-08 | End: 2022-01-17 | Stop reason: SDUPTHER

## 2021-03-13 ASSESSMENT — ENCOUNTER SYMPTOMS
DIARRHEA: 0
SHORTNESS OF BREATH: 0
CONSTIPATION: 0
RESPIRATORY NEGATIVE: 1
COUGH: 0
NAUSEA: 0
ABDOMINAL PAIN: 0

## 2021-03-14 NOTE — PROGRESS NOTES
PX [facility]  [facility]  [address]  [address] [address]  Dept: [phone]     3/15/2021   Chief Complaint   Patient presents with    Diabetes     had a1c done with graham also has an appt coming up     Rash     under her breast states it is getting better      HPI  Shira Pavon (:  1975) is a 41 y.o. female is an established patient. Patient has a history of diabetes, hyperlipidemia, fibromyalgia, vitamin D deficiency, morbid obesity, seasonal allergic rhinitis. GERD, MDD, PTSD, hepatic steatosis. RIGHT UPPER QUADRANT PAIN/HEPATIC STEATOSIS  Patient complains of some right upper quadrant pain. Patient has had this pain for a while. Patient is being followed up by Ar Garnica. She was told to have hepatic steatosis and also some cyst on her liver. CT of the abdomen was repeated once it was told that were no changes. She continues to have some mild wrap right upper quadrant pain not associated with any food intake. Pain is mild and achy. No issues with nausea, vomiting, or diarrhea. . Since has been a while since she was evaluated regular go ahead and get an ultrasound done. DIABETES MELLITUS- 8.2%- 2021  Patient has a  stable Diabetes Mellitus. Patient's recent hemoglobin A1c was 8.2% at her endocrinologist office. Lab Results   Component Value Date    LABA1C 6.9 2018   . Current therapy includes metformin. . Patient's dose was increased but unable to tolerate. Dose decreased to 500 mg. Patient is responding poorly with this therapy. Patient reports home glucose monitoring as Stable readings. Patient denieshypoglycemia episodes such as{symptoms. Patient denies blurred vision. Endocrinologist . However, patient does not want a follow-up with Dr. Tomasa Rodriguez again. We are going to go ahead and manage her diabetes from now on.   Therefore, we are going to go ahead and do the a bit A1c and start a SGLT2 if her hemoglobin A1c is above 8. The patient has not seen an ophthalmologist with in one last year. The patient is  on ACE inhibitors. The patient has not a foot examination. The patient has not seen an podiatrist with in last year. Shahla Cohen is currently on pravastatin (Pravachol). Patient was originally on Lipitor. But patient stopped taking the medication due to some myalgias. Medication changed from Lipitor to pravastatin. Patient denies adverse reaction to this medication. Compliance with treatment thus far has been excellent. The patient is not known to have coexisting coronary artery disease. The 10-year CVD risk score (SUKHWINDER'Renino, et al., 2008) is: 8.7%    Values used to calculate the score:      Age: 41 years      Sex: Female      Diabetic: Yes      Tobacco smoker: Yes      Systolic Blood Pressure: 127 mmHg      Is BP treated: No      HDL Cholesterol: 40 mg/dL      Total Cholesterol: 193 mg/dLNo results found for: CHOLFAST,  Lab Results   Component Value Date    HDL 40 (L) 10/08/2019     Lab Results   Component Value Date    LDLCHOLESTEROL 120 10/08/2019   No results found for: TRIGLYCFAST,  Lab Results   Component Value Date    CHOLHDLRATIO 4.8 10/08/2019     Lab Results   Component Value Date    VLDL NOT REPORTED (H) 10/08/2019     FIBROMYALGIA  Patient with a known general body aches due to fbromyalgia. Patient reported some worsening of body aches. Patient is not able to take any anti-inflammatory due to some drug insensitivity and GERD. Also. Patient have tried some gabapentin which did not work. Patient's PCP had tried to place Lyrica but was denied by the insurance company. We are going to go ahead and try to send this today. Patient have also had Cymbalta sent in the past but was also denied. Patient complains of muscle aches especially with her increasing amount of anxiety and depression.     ADJUSTMENT DISORDER/PTSD  Patient had some significant history of PTSD and was under the care of psychiatrist and's counselors. However, patient had finished the course of therapy for now. Patient was encouraged to follow-up with a PTSD counselor /EMDR. She was given a list of places that she can see. She will be trying to call this people up will be calling her office back for any other help she needs. Patient is not currently on any therapy which is her choice since she has a lot of sensitivity issues. PHQ-2 Over the past 2 weeks, how often have you been bothered by any of the following problems? Little interest or pleasure in doing things: Not at all  Feeling down, depressed, or hopeless: Not at all  PHQ-2 Score: 0  PHQ-9 Over the past 2 weeks, how often have you been bothered by any of the following problems? Thoughts that you would be better off dead, or of hurting yourself in some way: Not at all  PHQ-9 Total Score: 0    VITAMIN D  Patient has a known Vitamin D Deficiency. she had a course of supplementation for 12 weeks. she is due to get levels check. We continue to discuss ways to manage this condition. We also discussed ways to improve the levels. Such as learning food groups that are high in Vitamin D. We also discuss that sun exposure is beneficial however, too much sun and sun damage can potentially result in skin cancer. Vit D, 25-Hydroxy   Date Value Ref Range Status   10/08/2019 25.9 (L) 30.0 - 100.0 ng/mL Final     Comment:        Reference Range:  Vitamin D status         Range   Deficiency              <20 ng/mL   Mild Deficiency       20-30 ng/mL   Sufficiency           ng/mL   Toxicity               >100 ng/mL        NONSEASONAL ALLERGY  Patient with a known seasonal allergy and recurrent sinusitis. She is currently on Allegra. She c/o sinus pressure, post nasal drip and non prod cough. Symptoms are seasonal. She reports worsening of symptoms. Patient has a known multiple allergies. Including medication, environment, and food.   Patient is currently on Chris Prais. She continues to have some mild rhinorrhea postnasal drip. We will continue this therapy and continue to monitor. OBESITY  Patient's BMI is Body mass index is 35.74 kg/m². kg/m2. BMI is increasing. Patient understands that this condition increases the patient's risk for chronic conditions. Patient advised to practice healthy eating habits. Diet should include plenty of fruits, vegetables, whole grains, lean protein, and low-fat dairy. Increase water consumption. Reduce consumption of dietary sugar and sugar-sweetened beverages. Increasing physical exercises at least 3-4 times a week. We will monitor progression of condition. [employer]Tucker Blair. Offerti/us/therapists/oh/hillary     From what I can see online, the first 3 providers accept Myles Disla, but it is always a good idea to call and verify before scheduling an appointment!      The link below is an educational site that discusses EMDR therapy; what to expect and phases of treatment. [email or web address].Check/        Mehreen Elmore (EMDR/trauma trained therapist)   [address] [place] [address] [place]   [phone]     Thao Fox [facility] (Trauma Focused Cognitive Behavioral Therapy and EMDR trained)  [facility]   [address] [place] [address] [facility] [place] [address]  [address], [place]  [address] [place] [address], [address] [place] [address] [place] [address]  [address] [place] [address]  *Not sure if they accept Myles Disla - call and verify if interested in services, they have an extensive amount of services available if so.    [facility]  [phone]    PHQ-9 Total Score: 0 (3/15/2021 12:39 PM)  Thoughts that you would be better off dead, or of hurting yourself in some way: 0 (3/15/2021 12:39 PM)     Ready to quit: Not Answered  Counseling given: Yes    Patient Active Problem List   Diagnosis    Primary fibromyalgia syndrome    Antinuclear factor positive    Hepatic steatosis    Liver lesion    Gastritis    Elevated liver enzymes    Tobacco abuse    Anxiety    Type 2 diabetes mellitus without complication, without long-term current use of insulin (HCC)    Mixed hyperlipidemia    Gastro-esophageal reflux disease without esophagitis    Complete rotator cuff tear or rupture of right shoulder, not specified as traumatic    Nicotine dependence, cigarettes, uncomplicated    Other seasonal allergic rhinitis    Vitamin D deficiency    Pure hypercholesterolemia    Diabetes mellitus ([facility] [address]    Family history of malignant neoplasm of breast    Type 2 diabetes mellitus with diabetic polyneuropathy ([facility] [address]    BMI 36.0-36.9,adult    Class 2 severe obesity due to excess calories with serious comorbidity and body mass index (BMI) of 36.0 to 36.9 in adult (HCC)    Moderate episode of recurrent major depressive disorder (HCC)    PTSD (post-traumatic stress disorder)      Past Medical History:   Diagnosis Date    Abdominal pain     Antinuclear factor positive     Anxiety 1/16/2020    Arthritis     osteoarthritis    Autoimmune disease ([facility] [address]     unknown exactly, being worked up   [employer] 2 severe obesity due to excess calories with serious comorbidity and body mass index (BMI) of 36.0 to 36.9 in adult ([facility] [address] 7/27/2020    Depression     Diabetes mellitus (HCC)     Edema     Environmental allergies     Epigastric pain     Fibromyalgia     Gastritis     Gastro-esophageal reflux disease without esophagitis 10/6/2017    Hepatic steatosis     Liver lesion     Lumbago     Other seasonal allergic rhinitis 10/6/2017    Plantar fasciitis     Primary fibromyalgia syndrome     PTSD (post-traumatic stress disorder)     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[address]     Sjogren's disease ([facility] [address]     Type 2 diabetes mellitus with diabetic polyneuropathy ([facility] [address] 5/24/2018      Past Surgical History:   Procedure Laterality Date    HYSTERECTOMY partial hysterectomy    PAROTIDECTOMY Right     TONSILLECTOMY      UPPER GASTROINTESTINAL ENDOSCOPY  01/26/2016    chronic inactive gastritis    VARICOSE VEIN SURGERY Right      Family History   Problem Relation Age of Onset    Breast Cancer Mother         age 27   [facility] High Blood Pressure Mother     Diabetes Mother     High Blood Pressure Maternal Grandfather     Diabetes Maternal Grandfather      Current Outpatient Medications   Medication Sig Dispense Refill    pregabalin (LYRICA) 25 MG capsule Take 1 capsule by mouth 3 times daily for 30 days. 90 capsule 3    metFORMIN (GLUMETZA) 1000 MG extended release tablet Take 1 tablet by mouth 2 times daily 60 tablet 1    pravastatin (PRAVACHOL) 10 MG tablet TAKE 1 TABLET BY MOUTH DAILY 30 tablet 3    fluticasone (FLONASE) 50 MCG/ACT nasal spray INSTILL 1 SPRAY BY NASAL ROUTE 2 TIMES DAILY 16 g 2    Handicap Placard MISC by Does not apply route Expires December 2025 1 each 0    ibuprofen (ADVIL;MOTRIN) 600 MG tablet TAKE 1 TABLET BY MOUTH 4 TIMES DAILY AS NEEDED FOR PAIN 180 tablet 1    glipiZIDE (GLUCOTROL) 5 MG tablet Take 1 tablet by mouth 2 times daily 90 tablet 3    fexofenadine (ALLEGRA) 180 MG tablet Take 1 tablet by mouth daily 90 tablet 5    melatonin 3 MG TABS tablet Take 1 tablet by mouth nightly as needed (sleep) 30 tablet 3    cyclobenzaprine (FLEXERIL) 10 MG tablet Take 1 tablet by mouth 3 times daily as needed      aspirin (SM ASPIRIN ADULT LOW STRENGTH) 81 MG EC tablet Take 1 tablet by mouth daily      Elastic Bandages & Supports (AIRCAST SPORT ANKLE BRACE/RGHT) MISC Wear all the time 1 each 0    lidocaine (LIDODERM) 5 % Place 1 patch onto the skin daily 12 hours on, 12 hours off.  30 patch 0    omeprazole (PRILOSEC) 20 MG delayed release capsule TAKE 1 CAPSULE BY MOUTH ONCE DAILY AS NEEDED      TRUEPLUS LANCETS 28G MISC 1 (ONE) MISC MISC TWO TIMES DAILY      SM MUCUS RELIEF MAX STRENGTH 1200 MG TB12 TAKE 1 (ONE) TABLET EVERY TWELVE HOURS      EPINEPHrine (EPIPEN) 0.3 MG/0.3ML SOAJ injection Use as directed 2 each 0    TRUE METRIX BLOOD GLUCOSE TEST strip PATIENT CHECKS SUGAR 2-3 TIMES DAILY AND PRN  3    chlorhexidine (PERIDEX) 0.12 % solution        No current facility-administered medications for this visit. Review of Systems   Constitutional: Positive for fatigue. Negative for activity change, chills and fever. HENT: Positive for congestion and postnasal drip. Negative for hearing loss. Eyes: Negative for visual disturbance. Respiratory: Negative. Negative for cough and shortness of breath. Cardiovascular: Negative. Negative for chest pain and palpitations. Gastrointestinal: Negative for abdominal pain, constipation, diarrhea and nausea. Endocrine: Negative for cold intolerance. Genitourinary: Negative for dysuria and enuresis. Musculoskeletal: Positive for arthralgias and myalgias. Negative for back pain, gait problem and joint swelling. General myalgias   Skin: Negative for rash. Allergic/Immunologic: Positive for environmental allergies. Neurological: Positive for numbness. Negative for weakness, light-headedness and headaches. Hematological: Does not bruise/bleed easily. Psychiatric/Behavioral: Positive for sleep disturbance. Negative for suicidal ideas. The patient is nervous/anxious. Prior to Visit Medications    Medication Sig Taking? Authorizing Provider   pregabalin (LYRICA) 25 MG capsule Take 1 capsule by mouth 3 times daily for 30 days.  Yes DAV Cuevas CNP   metFORMIN (GLUMETZA) 1000 MG extended release tablet Take 1 tablet by mouth 2 times daily Yes DAV Cuevas CNP   pravastatin (PRAVACHOL) 10 MG tablet TAKE 1 TABLET BY MOUTH DAILY Yes DAV Cuevas CNP   fluticasone (FLONASE) 50 MCG/ACT nasal spray INSTILL 1 SPRAY BY NASAL ROUTE 2 TIMES DAILY Yes DAV Cuevas CNP   Handicap Placard MISC by Does not apply route Expires December 2025 Yes DAV Cuevas CNP   ibuprofen (ADVIL;MOTRIN) 600 MG tablet TAKE 1 TABLET BY MOUTH 4 TIMES DAILY AS NEEDED FOR PAIN Yes DAV Cuevas CNP   glipiZIDE (GLUCOTROL) 5 MG tablet Take 1 tablet by mouth 2 times daily Yes DAV Cuevas CNP   fexofenadine (ALLEGRA) 180 MG tablet Take 1 tablet by mouth daily Yes DAV Cuevas CNP   melatonin 3 MG TABS tablet Take 1 tablet by mouth nightly as needed (sleep) Yes DAV Willingham NP   cyclobenzaprine (FLEXERIL) 10 MG tablet Take 1 tablet by mouth 3 times daily as needed Yes Historical Provider, MD   aspirin ( ASPIRIN ADULT LOW STRENGTH) 81 MG EC tablet Take 1 tablet by mouth daily Yes Historical Provider, MD   Elastic Bandages & Supports (AIRCAST SPORT ANKLE BRACE/RGHT) MISC Wear all the time Yes DAV Cuevas CNP   lidocaine (LIDODERM) 5 % Place 1 patch onto the skin daily 12 hours on, 12 hours off.  Yes Nakul Cantu MD   omeprazole (PRILOSEC) 20 MG delayed release capsule TAKE 1 CAPSULE BY MOUTH ONCE DAILY AS NEEDED Yes Historical Provider, MD   TRUEPLUS LANCETS 28G MISC 1 (ONE) MISC MISC TWO TIMES DAILY Yes Historical Provider, MD VOGT MUCUS RELIEF MAX STRENGTH 1200 MG TB12 TAKE 1 (ONE) TABLET EVERY TWELVE HOURS Yes Historical Provider, MD   EPINEPHrine (EPIPEN) 0.3 MG/0.3ML SOAJ injection Use as directed Yes MD AMINA Cruz METRIX BLOOD GLUCOSE TEST strip PATIENT CHECKS SUGAR 2-3 TIMES DAILY AND PRN Yes Historical Provider, MD   chlorhexidine (PERIDEX) 0.12 % solution  Yes Historical Provider, MD   traZODone (DESYREL) 100 MG tablet trazodone 100 mg tablet  Historical Provider, MD      Social History     Tobacco Use    Smoking status: Current Every Day Smoker     Packs/day: 0.50     Years: 27.00     Pack years: 13.50     Types: Cigarettes    Smokeless tobacco: Never Used   Substance Use Topics    Alcohol use: No      Vitals:    03/15/21 0920   BP: 110/80   Pulse: 98   Temp: 97.1 °F (36.2 °C)   SpO2: 100%   Weight: 183 lb (83 kg)   Height: 5' (1.524 m)     Estimated body mass index is 35.74 kg/m² as calculated from the following:    Height as of this encounter: 5' (1.524 m). Weight as of this encounter: 183 lb (83 kg). Physical Exam  Vitals signs and nursing note reviewed. Constitutional:       Appearance: She is well-developed. She is obese. She is not ill-appearing. HENT:      Right Ear: Tympanic membrane and ear canal normal. No tenderness. No middle ear effusion. Left Ear: No mastoid tenderness. Nose: Mucosal edema and congestion present. No rhinorrhea. Right Sinus: Maxillary sinus tenderness and frontal sinus tenderness present. Left Sinus: Maxillary sinus tenderness and frontal sinus tenderness present. Mouth/Throat:      Mouth: Mucous membranes are pale. Pharynx: Uvula midline. Posterior oropharyngeal erythema present. No oropharyngeal exudate. Eyes:      General: Lids are normal. No scleral icterus. Right eye: No discharge. Pupils: Pupils are equal, round, and reactive to light. Neck:      Trachea: No tracheal tenderness. Cardiovascular:      Rate and Rhythm: Normal rate and regular rhythm. Pulmonary:      Effort: Pulmonary effort is normal.      Breath sounds: Normal breath sounds. No stridor. Abdominal:      General: Abdomen is protuberant. Bowel sounds are normal.      Palpations: Abdomen is soft. Tenderness: There is abdominal tenderness in the right upper quadrant. Comments: obese   Musculoskeletal: Normal range of motion. Comments: Multiple tender points, symmetric:  -under lower end of SCM muscle  -suboccipital  -mid upper trapezius muscle  -supraspinatus  -2nd costochondral junction  -2 cm distal of lateral epicondyle  -greater trochanter  -medial fat pad of the knee  -upper outer quadrant of buttocks. Lymphadenopathy:      Cervical: No cervical adenopathy. Skin:     General: Skin is dry. Neurological:      Mental Status: She is alert and oriented to person, place, and time. Psychiatric:         Mood and Affect: Mood is anxious. Speech: Speech is not rapid and pressured. Behavior: Behavior normal.         Judgment: Inappropriate judgment: .diag. Most recent labs reviewed with patient, and all questions answered. Lab Results   Component Value Date    WBC 9.2 10/08/2019    HGB 14.7 10/08/2019    HCT 43.3 10/08/2019    MCV 89.7 10/08/2019     10/08/2019     Lab Results   Component Value Date     10/08/2019    K 4.3 10/08/2019     10/08/2019    CO2 24 10/08/2019    BUN 8 10/08/2019    CREATININE 0.69 10/08/2019    GLUCOSE 231 10/08/2019    CALCIUM 9.8 10/08/2019      Lab Results   Component Value Date    ALT 86 (H) 10/08/2019    AST 54 (H) 10/08/2019    ALKPHOS 123 (H) 10/08/2019    BILITOT 0.32 10/08/2019     Lab Results   Component Value Date    TSH 2.24 10/08/2019     Lab Results   Component Value Date    CHOL 193 10/08/2019    CHOL 190 04/23/2018     Lab Results   Component Value Date    TRIG 165 (H) 10/08/2019    TRIG 117 04/23/2018     Lab Results   Component Value Date    HDL 40 (L) 10/08/2019    HDL 36 (L) 04/23/2018     Lab Results   Component Value Date    LDLCHOLESTEROL 120 10/08/2019    LDLCHOLESTEROL 131 (H) 04/23/2018     Lab Results   Component Value Date    CHOLHDLRATIO 4.8 10/08/2019    CHOLHDLRATIO 5.3 (H) 04/23/2018     Lab Results   Component Value Date    LABA1C 6.9 06/11/2018      Lab Results   Component Value Date    PGTDWSOJ66 540 10/08/2019     No results found for: FOLATE  Lab Results   Component Value Date    VITD25 25.9 (L) 10/08/2019     ASSESSMENT/PLAN:  1. Type 2 diabetes mellitus with diabetic polyneuropathy, without long-term current use of insulin (HCC)  Failure to Improve  Continue therapy. We will continue to monitor Hemoglobin A1c   DISCUSSED and ADVISED TO:  Continue to check Glucose levels at home.   Report increased and 8. Class 2 severe obesity due to excess calories with serious comorbidity and body mass index (BMI) of 36.0 to 36.9 in adult [facility])  Failure to Improve  BMI increasing  DISCUSSED AND ADVISED TO:  Eat a low-fat and low carbohydrates diet. Avoid fried foods especially fast food. Choose healthier options for snacks. Have 5-6 servings of fruits and vegetables per day. Cut down on eating processed food. Add 30 minutes to 1 hour aerobic exercise for 3-4 days a week. 9. Screening for viral disease  recommended    - Hepatitis C Antibody; Future       Controlled Substance Monitoring:  Acute and Chronic Pain Monitoring:   RX Monitoring 3/15/2021   Periodic Controlled Substance Monitoring No signs of potential drug abuse or diversion identified. Orders Placed This Encounter   Procedures    US ABDOMEN LIMITED     Standing Status:   Future     Standing Expiration Date:   3/15/2022     Scheduling Instructions:      Liver gallbladder pancreas     Order Specific Question:   Reason for exam:     Answer:   RUQ pain     Order Specific Question:   Specify organ? Answer:   LIVER     Order Specific Question:   Specify organ? Answer:   GALLBLADDER     Order Specific Question:   Specify organ? Answer:   PANCREAS    Hepatitis C Antibody     Standing Status:   Future     Standing Expiration Date:   9/13/2022    Hemoglobin A1C     Standing Status:   Future     Standing Expiration Date:   9/15/2022     Orders Placed This Encounter   Medications    pregabalin (LYRICA) 25 MG capsule     Sig: Take 1 capsule by mouth 3 times daily for 30 days.      Dispense:  90 capsule     Refill:  3    metFORMIN (GLUMETZA) 1000 MG extended release tablet     Sig: Take 1 tablet by mouth 2 times daily     Dispense:  60 tablet     Refill:  1      Medications Discontinued During This Encounter   Medication Reason    vitamin D (ERGOCALCIFEROL) 1.25 MG (68374 UT) CAPS capsule Therapy completed    metFORMIN (GLUCOPHAGE-XR) 500 MG extended release tablet REORDER      Health Maintenance Due   Topic Date Due    Hepatitis C screen  Never done    Pneumococcal 0-64 years Vaccine (1 of 1 - PPSV23) Never done    Diabetic foot exam  Never done    Diabetic retinal exam  Never done    HIV screen  Never done    COVID-19 Vaccine (1) Never done    Hepatitis B vaccine (1 of 3 - Risk 3-dose series) Never done    DTaP/Tdap/Td vaccine (1 - Tdap) Never done    Cervical cancer screen  Never done    Breast cancer screen  Never done    A1C test (Diabetic or Prediabetic)  06/11/2019    Flu vaccine (1) Never done    Diabetic microalbuminuria test  10/08/2020    Lipid screen  10/08/2020      No follow-ups on file. [place] received counseling on the following healthy behaviors: nutrition, exercise and medication adherence  Reviewed prior labs and health maintenance  Continue current medications, diet and exercise. Discussed use, benefit, and side effects of prescribed medications. Barriers to medication compliance addressed. Patient given educational materials - see patient instructions  Was a self-tracking handout given in paper form or via [employer]? Yes    Requested Prescriptions     Signed Prescriptions Disp Refills    pregabalin (LYRICA) 25 MG capsule 90 capsule 3     Sig: Take 1 capsule by mouth 3 times daily for 30 days.  metFORMIN (GLUMETZA) 1000 MG extended release tablet 60 tablet 1     Sig: Take 1 tablet by mouth 2 times daily       All patient questions answered. Patient voiced understanding. Quality Measures    Body mass index is 35.74 kg/m². Elevated. Weight control planned discussed Healthy diet and regular exercise. BP: 110/80 Blood pressure is normal. Treatment plan consists of No treatment change needed.     Lab Results   Component Value Date    LDLCHOLESTEROL 120 10/08/2019    (goal LDL reduction with dx if diabetes is 50% LDL reduction)      PHQ Scores 3/15/2021 5/20/2020 1/23/2020 5/24/2018   PHQ2 Score 0 5 6 2   PHQ9 Score 0 12 21 2     Interpretation of Total Score Depression Severity: 1-4 = Minimal depression, 5-9 = Mild depression, 10-14 = Moderate depression, 15-19 = Moderately severe depression, 20-27 = Severe depression   This note was completed by using the assistance of a speech-recognition program. However, inadvertent computerized transcription errors may be present. Although every effort was made to ensure accuracy, no guarantees can be provided that every mistake has been identified and corrected by editing   An electronic signature was used to authenticate this note.   --DAV Cee - CNP on 3/15/2021 at 12:43 PM

## 2021-03-15 ENCOUNTER — TELEPHONE (OUTPATIENT)
Dept: FAMILY MEDICINE CLINIC | Age: 46
End: 2021-03-15

## 2021-03-15 ENCOUNTER — OFFICE VISIT (OUTPATIENT)
Dept: FAMILY MEDICINE CLINIC | Age: 46
End: 2021-03-15
Payer: COMMERCIAL

## 2021-03-15 VITALS
WEIGHT: 183 LBS | OXYGEN SATURATION: 100 % | TEMPERATURE: 97.1 F | HEIGHT: 60 IN | BODY MASS INDEX: 35.93 KG/M2 | SYSTOLIC BLOOD PRESSURE: 110 MMHG | HEART RATE: 98 BPM | DIASTOLIC BLOOD PRESSURE: 80 MMHG

## 2021-03-15 DIAGNOSIS — E66.01 CLASS 2 SEVERE OBESITY DUE TO EXCESS CALORIES WITH SERIOUS COMORBIDITY AND BODY MASS INDEX (BMI) OF 36.0 TO 36.9 IN ADULT (HCC): ICD-10-CM

## 2021-03-15 DIAGNOSIS — E78.2 MIXED HYPERLIPIDEMIA: ICD-10-CM

## 2021-03-15 DIAGNOSIS — B37.2 CANDIDAL INTERTRIGO: Primary | ICD-10-CM

## 2021-03-15 DIAGNOSIS — K21.9 GASTRO-ESOPHAGEAL REFLUX DISEASE WITHOUT ESOPHAGITIS: ICD-10-CM

## 2021-03-15 DIAGNOSIS — F33.1 MODERATE EPISODE OF RECURRENT MAJOR DEPRESSIVE DISORDER (HCC): ICD-10-CM

## 2021-03-15 DIAGNOSIS — M79.7 PRIMARY FIBROMYALGIA SYNDROME: ICD-10-CM

## 2021-03-15 DIAGNOSIS — F43.10 PTSD (POST-TRAUMATIC STRESS DISORDER): ICD-10-CM

## 2021-03-15 DIAGNOSIS — K76.0 HEPATIC STEATOSIS: ICD-10-CM

## 2021-03-15 DIAGNOSIS — Z11.59 SCREENING FOR VIRAL DISEASE: ICD-10-CM

## 2021-03-15 DIAGNOSIS — E11.42 TYPE 2 DIABETES MELLITUS WITH DIABETIC POLYNEUROPATHY, WITHOUT LONG-TERM CURRENT USE OF INSULIN (HCC): Primary | ICD-10-CM

## 2021-03-15 PROCEDURE — G8417 CALC BMI ABV UP PARAM F/U: HCPCS | Performed by: FAMILY MEDICINE

## 2021-03-15 PROCEDURE — 4004F PT TOBACCO SCREEN RCVD TLK: CPT | Performed by: FAMILY MEDICINE

## 2021-03-15 PROCEDURE — 3046F HEMOGLOBIN A1C LEVEL >9.0%: CPT | Performed by: FAMILY MEDICINE

## 2021-03-15 PROCEDURE — 99214 OFFICE O/P EST MOD 30 MIN: CPT | Performed by: FAMILY MEDICINE

## 2021-03-15 PROCEDURE — G8427 DOCREV CUR MEDS BY ELIG CLIN: HCPCS | Performed by: FAMILY MEDICINE

## 2021-03-15 PROCEDURE — G8484 FLU IMMUNIZE NO ADMIN: HCPCS | Performed by: FAMILY MEDICINE

## 2021-03-15 PROCEDURE — 2022F DILAT RTA XM EVC RTNOPTHY: CPT | Performed by: FAMILY MEDICINE

## 2021-03-15 RX ORDER — PREGABALIN 25 MG/1
25 CAPSULE ORAL 3 TIMES DAILY
Qty: 90 CAPSULE | Refills: 3 | Status: SHIPPED | OUTPATIENT
Start: 2021-03-15 | End: 2021-06-14 | Stop reason: SDUPTHER

## 2021-03-15 RX ORDER — EMPAGLIFLOZIN 10 MG/1
1 TABLET, FILM COATED ORAL DAILY
Qty: 30 TABLET | Refills: 1 | Status: SHIPPED | OUTPATIENT
Start: 2021-03-15 | End: 2021-08-02

## 2021-03-15 RX ORDER — NYSTATIN 100000 [USP'U]/G
POWDER TOPICAL
Qty: 1 BOTTLE | Refills: 5 | Status: SHIPPED | OUTPATIENT
Start: 2021-03-15

## 2021-03-15 RX ORDER — METFORMIN HYDROCHLORIDE 1000 MG/1
1000 TABLET, FILM COATED, EXTENDED RELEASE ORAL 2 TIMES DAILY
Qty: 60 TABLET | Refills: 1 | Status: SHIPPED | OUTPATIENT
Start: 2021-03-15 | End: 2021-05-10

## 2021-03-15 ASSESSMENT — PATIENT HEALTH QUESTIONNAIRE - PHQ9
SUM OF ALL RESPONSES TO PHQ QUESTIONS 1-9: 0
SUM OF ALL RESPONSES TO PHQ9 QUESTIONS 1 & 2: 0
2. FEELING DOWN, DEPRESSED OR HOPELESS: 0

## 2021-03-15 ASSESSMENT — ENCOUNTER SYMPTOMS: BACK PAIN: 0

## 2021-03-15 NOTE — PATIENT INSTRUCTIONS
[email or web address]?:[email or web address]:display_count=[email or web address]:origin=[email or web address]&:embed=[email or web address]    [address]  [email or web address]?:[email or web address]:display_count=[email or web address]&:origin=[email or web address]_share_link&:embed=y    [address]  [email or web address]?:[email or web address]:display_count=[email or web address]&:origin=[email or web address]_link&:embed=y    Patient Education        Pneumococcal Conjugate Vaccine (PCV13): What You Need to Know  Why get vaccinated? Pneumococcal conjugate vaccine (PCV13) can prevent pneumococcal disease. Pneumococcal disease refers to any illness caused by pneumococcal bacteria. These bacteria can cause many types of illnesses, including pneumonia, which is an infection of the lungs. Pneumococcal bacteria are one of the most common causes of pneumonia. Besides pneumonia, pneumococcal bacteria can also cause:  · Ear infections  · Sinus infections  · Meningitis (infection of the tissue covering the brain and spinal cord)  · Bacteremia (bloodstream infection)  Anyone can get pneumococcal disease, but children under 3years of age, people with certain medical conditions, adults 72 years or older, and cigarette smokers are at the highest risk. Most pneumococcal infections are mild. However, some can result in long-term problems, such as brain damage or hearing loss. Meningitis, bacteremia, and pneumonia caused by pneumococcal disease can be fatal.  PCV13  PCV13 protects against 13 types of bacteria that cause pneumococcal disease. Infants and young children usually need 4 doses of pneumococcal conjugate vaccine, at 2, 4, 6, and 15 13months of age. In some cases, a child might need fewer than 4 doses to complete PCV13 vaccination. A dose of PCV13 vaccine is also recommended for anyone 2 years or older with certain medical conditions if they did not already receive PCV13. This vaccine may be given to adults 72 years or older based on discussions between the patient and health care provider. Talk with your health care provider  Tell your vaccine provider if the person getting the vaccine:  · Has had an allergic reaction after a previous dose of PCV13, to an earlier pneumococcal conjugate vaccine known as PCV7, or to any vaccine containing diphtheria toxoid (for example, DTaP), or has any severe, life-threatening allergies. · In some cases, your health care provider may decide to postpone PCV13 vaccination to a future visit. People with minor illnesses, such as a cold, may be vaccinated. People who are moderately or severely ill should usually wait until they recover before getting PCV13. Your health care provider can give you more information. Risks of a vaccine reaction  · Redness, swelling, pain, or tenderness where the shot is given, and fever, loss of appetite, fussiness (irritability), feeling tired, headache, and chills can happen after PCV13. Mando Goad children may be at increased risk for seizures caused by fever after PCV13 if it is administered at the same time as inactivated influenza vaccine. Ask your health care provider for more information. People sometimes faint after medical procedures, including vaccination. Tell your provider if you feel dizzy or have vision changes or ringing in the ears. As with any medicine, there is a very remote chance of a vaccine causing a severe allergic reaction, other serious injury, or death. What if there is a serious problem? An allergic reaction could occur after the vaccinated person leaves the clinic. If you see signs of a severe allergic reaction (hives, swelling of the face and throat, difficulty breathing, a fast heartbeat, dizziness, or weakness), call 9-1-1 and get the person to the nearest hospital.  For other signs that concern you, call your health care provider. Adverse reactions should be reported to the Vaccine Adverse Event Reporting System (VAERS). Your health care provider will usually file this report, or you can do it yourself. Visit the VAERS website at www.vaers. [facility].gov or call 3-964.190.8883. VAERS is only for reporting reactions, and VAERS staff do not give medical advice. The National Vaccine Injury Compensation Program  The National Vaccine Injury Compensation Program (VICP) is a federal program that was created to compensate people who may have been injured by certain vaccines. Visit the VICP website at www.[facility].gov/vaccinecompensation or call 1-978.770.5855 to learn about the program and about filing a claim. There is a time limit to file a claim for compensation. How can I learn more? · Ask your health care provider. · Call your local or state health department. · Contact the Centers for Disease Control and Prevention (CDC):  ? Call 7-686.849.4491 (1-800-CDC-INFO) or  ? Visit CDC's website at www.cdc.gov/vaccines  Vaccine Information Statement (Interim)  PCV13  10/30/2019  42 ANJEL Perez Brochure [id number]-[id number]  Department of Health and Human Services  Centers for Disease Control and Prevention  Many Vaccine Information Statements are available in [place] and other languages. See www.immunize.org/vis. Muchas hojas de información sobre vacunas están disponibles en español y en otros idiomas. Visite www.immunize.org/vis. Care instructions adapted under license by [facility] ([facility]). If you have questions about a medical condition or this instruction, always ask your healthcare professional. [place] [address] any warranty or liability for your use of this information.

## 2021-03-15 NOTE — TELEPHONE ENCOUNTER
Pt called in and stated she was in today 03/15 and her PCP was going to call her in a new Rx for her diabetes and a powder for the yeast under her breast. Neither Rx was at pharmacy.

## 2021-03-16 NOTE — TELEPHONE ENCOUNTER
SPOKE WITH PATIENT REGARDING MEDICATION SENT. SHE DID STATE THE JARDIANCE ISNT COVERED BY INSURANCE. INSURANCE STATES THEY FAXED US SOMETHING REGARDING IT. PRIOR AUTH WILL BE STARTED ALSO INFORMED PATIENT TO CALL INSURANCE TO FIND OUT WHAT MEDICATION WILL BE COVERED. SHE WILL CALL BACK WITH THIS INFORMATION.

## 2021-03-18 ENCOUNTER — HOSPITAL ENCOUNTER (OUTPATIENT)
Age: 46
Discharge: HOME | End: 2021-03-18
Payer: COMMERCIAL

## 2021-03-18 ENCOUNTER — HOSPITAL ENCOUNTER (OUTPATIENT)
Dept: ULTRASOUND IMAGING | Age: 46
Discharge: HOME | End: 2021-03-20
Payer: COMMERCIAL

## 2021-03-18 DIAGNOSIS — Z11.59 SCREENING FOR VIRAL DISEASE: ICD-10-CM

## 2021-03-18 DIAGNOSIS — K76.0 HEPATIC STEATOSIS: ICD-10-CM

## 2021-03-18 DIAGNOSIS — E11.42 TYPE 2 DIABETES MELLITUS WITH DIABETIC POLYNEUROPATHY, WITHOUT LONG-TERM CURRENT USE OF INSULIN (HCC): ICD-10-CM

## 2021-03-18 LAB
ESTIMATED AVERAGE GLUCOSE: 217 MG/DL
HBA1C MFR BLD: 9.2 % (ref 4–6)
HEPATITIS C ANTIBODY: NONREACTIVE

## 2021-03-18 PROCEDURE — 86803 HEPATITIS C AB TEST: CPT

## 2021-03-18 PROCEDURE — 83036 HEMOGLOBIN GLYCOSYLATED A1C: CPT

## 2021-03-18 PROCEDURE — 36415 COLL VENOUS BLD VENIPUNCTURE: CPT

## 2021-03-18 PROCEDURE — 76705 ECHO EXAM OF ABDOMEN: CPT

## 2021-03-18 NOTE — RESULT ENCOUNTER NOTE
Please let the patient know that her ultrasound result came in today. The radiologist noted a 5 mm polyp versus gallstones noted. They also noted fatty liver    There is also a 1.6 cm lesion. It is not clear if this is the same lesion that was found in the past.  In fact, the radiologist is recommending a follow-up CT or MRI. Therefore, I think it is better to reestablish with a gastroenterologist to figure out the next step. Please clarify if he wants to go back to the same gastroenterologist or she wants to find a different service. Thank you.

## 2021-03-19 NOTE — RESULT ENCOUNTER NOTE
Please let the patient know that the hepatitis c virus testing was negative. Thanks. Ha1c elevated. WIll need to add medication. Will discuss more on next visit.

## 2021-03-22 ENCOUNTER — VIRTUAL VISIT (OUTPATIENT)
Dept: GASTROENTEROLOGY | Age: 46
End: 2021-03-22
Payer: COMMERCIAL

## 2021-03-22 DIAGNOSIS — R79.89 ELEVATED LFTS: ICD-10-CM

## 2021-03-22 DIAGNOSIS — R93.2 ABNORMAL CT SCAN, LIVER: Primary | ICD-10-CM

## 2021-03-22 PROCEDURE — 99212 OFFICE O/P EST SF 10 MIN: CPT | Performed by: INTERNAL MEDICINE

## 2021-03-22 RX ORDER — CHOLECALCIFEROL (VITAMIN D3) 1250 MCG
CAPSULE ORAL
COMMUNITY

## 2021-03-22 ASSESSMENT — ENCOUNTER SYMPTOMS
DIARRHEA: 0
CONSTIPATION: 0
WHEEZING: 0
ALLERGIC/IMMUNOLOGIC NEGATIVE: 1
RECTAL PAIN: 0
BACK PAIN: 0
BLOOD IN STOOL: 0
NAUSEA: 0
TROUBLE SWALLOWING: 1
CHOKING: 0
VOMITING: 0
COUGH: 1
SINUS PRESSURE: 1
ABDOMINAL PAIN: 1
ABDOMINAL DISTENTION: 1
ANAL BLEEDING: 0
SORE THROAT: 0
VOICE CHANGE: 0

## 2021-03-22 NOTE — PROGRESS NOTES
chronic inactive gastritis    VARICOSE VEIN SURGERY Right        CURRENT MEDICATIONS:    Current Outpatient Medications:     pregabalin (LYRICA) 25 MG capsule, Take 1 capsule by mouth 3 times daily for 30 days. , Disp: 90 capsule, Rfl: 3    metFORMIN (GLUMETZA) 1000 MG extended release tablet, Take 1 tablet by mouth 2 times daily, Disp: 60 tablet, Rfl: 1    nystatin (MYCOSTATIN) 279776 UNIT/GM powder, Apply topically 4 times daily. , Disp: 1 Bottle, Rfl: 5    empagliflozin (JARDIANCE) 10 MG tablet, Take 1 tablet by mouth daily, Disp: 30 tablet, Rfl: 1    pravastatin (PRAVACHOL) 10 MG tablet, TAKE 1 TABLET BY MOUTH DAILY, Disp: 30 tablet, Rfl: 3    fluticasone (FLONASE) 50 MCG/ACT nasal spray, INSTILL 1 SPRAY BY NASAL ROUTE 2 TIMES DAILY, Disp: 16 g, Rfl: 2    Handicap Placard MISC, by Does not apply route Expires December 2025, Disp: 1 each, Rfl: 0    ibuprofen (ADVIL;MOTRIN) 600 MG tablet, TAKE 1 TABLET BY MOUTH 4 TIMES DAILY AS NEEDED FOR PAIN, Disp: 180 tablet, Rfl: 1    glipiZIDE (GLUCOTROL) 5 MG tablet, Take 1 tablet by mouth 2 times daily, Disp: 90 tablet, Rfl: 3    fexofenadine (ALLEGRA) 180 MG tablet, Take 1 tablet by mouth daily, Disp: 90 tablet, Rfl: 5    melatonin 3 MG TABS tablet, Take 1 tablet by mouth nightly as needed (sleep), Disp: 30 tablet, Rfl: 3    cyclobenzaprine (FLEXERIL) 10 MG tablet, Take 1 tablet by mouth 3 times daily as needed, Disp: , Rfl:     aspirin ( ASPIRIN ADULT LOW STRENGTH) 81 MG EC tablet, Take 1 tablet by mouth daily, Disp: , Rfl:     Elastic Bandages & Supports (AIRCAST SPORT ANKLE BRACE/RGHT) MISC, Wear all the time, Disp: 1 each, Rfl: 0    lidocaine (LIDODERM) 5 %, Place 1 patch onto the skin daily 12 hours on, 12 hours off., Disp: 30 patch, Rfl: 0    omeprazole (PRILOSEC) 20 MG delayed release capsule, TAKE 1 CAPSULE BY MOUTH ONCE DAILY AS NEEDED, Disp: , Rfl:     TRUEPLUS LANCETS 28G MISC, 1 (ONE) MISC MISC TWO TIMES DAILY, Disp: , Rfl:     TORIE MUCUS RELIEF MAX STRENGTH 1200 MG TB12, TAKE 1 (ONE) TABLET EVERY TWELVE HOURS, Disp: , Rfl:     EPINEPHrine (EPIPEN) 0.3 MG/0.3ML SOAJ injection, Use as directed, Disp: 2 each, Rfl: 0    TRUE METRIX BLOOD GLUCOSE TEST strip, PATIENT CHECKS SUGAR 2-3 TIMES DAILY AND PRN, Disp: , Rfl: 3    chlorhexidine (PERIDEX) 0.12 % solution, , Disp: , Rfl:     ALLERGIES:   Allergies   Allergen Reactions    Morphine Swelling    Penicillins Swelling    Amoxicillin     Hydrocodone-Acetaminophen     Oxycodone-Acetaminophen     Percocet [Oxycodone-Acetaminophen]     Trazodone Hcl Other (See Comments)    Ultram [Tramadol]     Vicodin [Hydrocodone-Acetaminophen]        FAMILY HISTORY:       Problem Relation Age of Onset    Breast Cancer Mother         age 27   [address] High Blood Pressure Mother     Diabetes Mother     High Blood Pressure Maternal Grandfather     Diabetes Maternal Grandfather          SOCIAL HISTORY:   Social History     Socioeconomic History    Marital status:      Spouse name: Not on file    Number of children: Not on file    Years of education: Not on file    Highest education level: Not on file   Occupational History    Not on file   Social Needs    Financial resource strain: Not on file    Food insecurity     Worry: Not on file     Inability: Not on file    Transportation needs     Medical: Not on file     Non-medical: Not on file   Tobacco Use    Smoking status: Current Every Day Smoker     Packs/day: 0.50     Years: 27.00     Pack years: 13.50     Types: Cigarettes    Smokeless tobacco: Never Used   Substance and Sexual Activity    Alcohol use: No    Drug use: No    Sexual activity: Not on file   Lifestyle    Physical activity     Days per week: Not on file     Minutes per session: Not on file    Stress: Not on file   Relationships    Social connections     Talks on phone: Not on file     Gets together: Not on file     Attends [employer] service: Not on file     Active member of club or organization: Not on file     Attends meetings of clubs or organizations: Not on file     Relationship status: Not on file    Intimate partner violence     Fear of current or ex partner: Not on file     Emotionally abused: Not on file     Physically abused: Not on file     Forced sexual activity: Not on file   Other Topics Concern    Not on file   Social History Narrative    Not on file       REVIEW OF SYSTEMS: A 12-point review of systemswas obtained and pertinent positives and negatives were enumerated above in the history of present illness. All other reviewed systems / symptoms were negative. Review of Systems   Constitutional: Positive for fatigue. Negative for appetite change and unexpected weight change. HENT: Positive for postnasal drip, sinus pressure and trouble swallowing. Negative for dental problem, sore throat and voice change. Eyes: Positive for visual disturbance. Respiratory: Positive for cough. Negative for choking and wheezing. Cardiovascular: Positive for leg swelling. Negative for chest pain and palpitations. Gastrointestinal: Positive for abdominal distention and abdominal pain. Negative for anal bleeding, blood in stool, constipation, diarrhea, nausea, rectal pain and vomiting. Endocrine: Negative. Genitourinary: Negative. Negative for difficulty urinating. Musculoskeletal: Positive for myalgias. Negative for arthralgias, back pain and gait problem. Allergic/Immunologic: Negative. Negative for environmental allergies and food allergies. Neurological: Negative. Negative for dizziness, weakness, light-headedness, numbness and headaches. Hematological: Negative. Does not bruise/bleed easily. Psychiatric/Behavioral: Negative. Negative for sleep disturbance. The patient is not nervous/anxious.             LABORATORY DATA: Reviewed  Lab Results   Component Value Date    WBC 9.2 10/08/2019    HGB 14.7 10/08/2019    HCT 43.3 10/08/2019    MCV 89.7 10/08/2019    PLT 201 10/08/2019     10/08/2019    K 4.3 10/08/2019     10/08/2019    CO2 24 10/08/2019    BUN 8 10/08/2019    CREATININE 0.69 10/08/2019    LABALBU 4.3 10/08/2019    BILITOT 0.32 10/08/2019    ALKPHOS 123 (H) 10/08/2019    AST 54 (H) 10/08/2019    ALT 86 (H) 10/08/2019    INR 1.1 01/28/2019         Lab Results   Component Value Date    RBC 4.83 10/08/2019    HGB 14.7 10/08/2019    MCV 89.7 10/08/2019    MCH 30.5 10/08/2019    MCHC 34.0 10/08/2019    RDW 14.1 10/08/2019    MPV 10.5 10/08/2019    BASOPCT 1 10/08/2019    LYMPHSABS 2.80 10/08/2019    MONOSABS 0.50 10/08/2019    NEUTROABS 5.60 10/08/2019    EOSABS 0.30 10/08/2019    BASOSABS 0.10 10/08/2019         DIAGNOSTIC TESTING:     Us Abdomen Limited Specify Organ? Liver, Gallbladder, Pancreas    Result Date: 3/18/2021  EXAMINATION: RIGHT UPPER QUADRANT ULTRASOUND 3/18/2021 8:58 am COMPARISON: Multiphase CT abdomen January 9, 2018. HISTORY: ORDERING SYSTEM PROVIDED HISTORY: Hepatic steatosis TECHNOLOGIST PROVIDED HISTORY: RUQ pain Specify organ?->LIVER Specify organ?->GALLBLADDER Specify organ?->PANCREAS FINDINGS: LIVER:  Fatty infiltration of liver. No intrahepatic bile duct dilatation. Questionable hyperechoic lesion left lobe of the liver measuring 1.6 cm. Small cyst measuring 9 mm. Hepatopetal flow seen within the portal vein. BILIARY SYSTEM:  5 mm polyp versus adherent stone. No gallbladder wall thickening or pericholecystic fluid. Negative Forman's sign. Common bile duct is within normal limits measuring 3 mm. RIGHT KIDNEY: The right kidney is grossly unremarkable without evidence of hydronephrosis. PANCREAS:  Visualized portions of the pancreas are unremarkable. OTHER: No evidence of right upper quadrant ascites. 1. 5 mm polyp versus urine stone involving the gallbladder. No follow-up imaging is recommended. 2. Fatty infiltration of the liver with a questionable hyperechoic lesion involving the left lobe measuring 1.6 cm.   Finding could represent an area of increased fatty infiltration, however underlying mass cannot be excluded. No masses seen within this region on the prior CT study from 2018. Complete evaluation with CT or MRI utilizing liver mass protocol is suggested. PHYSICAL EXAMINATION: Vital signs reviewed per the nursing documentation. There were no vitals taken for this visit. There is no height or weight on file to calculate BMI. Physical Exam      Video visit due to COVID-19 pandemic    IMPRESSION: Ms. Concha Mckay is a 41 y.o. female with right upper quadrant abdominal pain. Very likely due to fatty liver disease. We will obtain CT scan given question of a mass. Full liver serologies. Follow-up in 1 month. Thank you for allowing me to participate in the care of Ms. Pond. For any further questions please do not hesitate to contact me. I have reviewed and agree with the ROS entered by the MA/LPN. Note is dictated utilizing voice recognition software. Unfortunately this leads to occasional typographical errors. Please contact our office if you have any questions.     Adrian Hicks MD  [facility]  O: #[phone]

## 2021-05-10 DIAGNOSIS — E11.42 TYPE 2 DIABETES MELLITUS WITH DIABETIC POLYNEUROPATHY, WITHOUT LONG-TERM CURRENT USE OF INSULIN (HCC): ICD-10-CM

## 2021-05-10 RX ORDER — METFORMIN HYDROCHLORIDE 500 MG/1
TABLET, EXTENDED RELEASE ORAL
Qty: 120 TABLET | Refills: 2 | Status: SHIPPED | OUTPATIENT
Start: 2021-05-10 | End: 2021-08-02

## 2021-05-10 NOTE — TELEPHONE ENCOUNTER
Can we please remind her to get her blood work done that is overdue. Otherwise, we cannot continue to refill medications.

## 2021-05-18 ENCOUNTER — HOSPITAL ENCOUNTER (OUTPATIENT)
Dept: CT IMAGING | Age: 46
Discharge: HOME | End: 2021-05-20
Payer: COMMERCIAL

## 2021-05-18 ENCOUNTER — HOSPITAL ENCOUNTER (OUTPATIENT)
Age: 46
Discharge: HOME | End: 2021-05-18
Payer: COMMERCIAL

## 2021-05-18 DIAGNOSIS — E78.2 MIXED HYPERLIPIDEMIA: ICD-10-CM

## 2021-05-18 DIAGNOSIS — R93.2 ABNORMAL CT SCAN, LIVER: ICD-10-CM

## 2021-05-18 DIAGNOSIS — E11.9 TYPE 2 DIABETES MELLITUS WITHOUT COMPLICATION, WITHOUT LONG-TERM CURRENT USE OF INSULIN (HCC): ICD-10-CM

## 2021-05-18 DIAGNOSIS — R79.89 ELEVATED LFTS: ICD-10-CM

## 2021-05-18 DIAGNOSIS — Z11.59 SCREENING FOR VIRAL DISEASE: ICD-10-CM

## 2021-05-18 DIAGNOSIS — E55.9 VITAMIN D DEFICIENCY: ICD-10-CM

## 2021-05-18 DIAGNOSIS — M79.7 PRIMARY FIBROMYALGIA SYNDROME: ICD-10-CM

## 2021-05-18 LAB
-: ABNORMAL
ABSOLUTE EOS #: 0.3 K/UL (ref 0–0.4)
ABSOLUTE IMMATURE GRANULOCYTE: NORMAL K/UL (ref 0–0.3)
ABSOLUTE LYMPH #: 2.5 K/UL (ref 1–4.8)
ABSOLUTE MONO #: 0.4 K/UL (ref 0.1–1.3)
ALBUMIN SERPL-MCNC: 4.2 G/DL (ref 3.5–5.2)
ALBUMIN/GLOBULIN RATIO: ABNORMAL (ref 1–2.5)
ALP BLD-CCNC: 103 U/L (ref 35–104)
ALT SERPL-CCNC: 51 U/L (ref 5–33)
AMORPHOUS: ABNORMAL
ANION GAP SERPL CALCULATED.3IONS-SCNC: 9 MMOL/L (ref 9–17)
AST SERPL-CCNC: 31 U/L
BACTERIA: ABNORMAL
BASOPHILS # BLD: 1 % (ref 0–2)
BASOPHILS ABSOLUTE: 0.1 K/UL (ref 0–0.2)
BILIRUB SERPL-MCNC: 0.47 MG/DL (ref 0.3–1.2)
BILIRUBIN URINE: NEGATIVE
BUN BLDV-MCNC: 9 MG/DL (ref 6–20)
BUN/CREAT BLD: ABNORMAL (ref 9–20)
CALCIUM SERPL-MCNC: 9.5 MG/DL (ref 8.6–10.4)
CASTS UA: ABNORMAL /LPF
CERULOPLASMIN: 24 MG/DL (ref 16–45)
CHLORIDE BLD-SCNC: 103 MMOL/L (ref 98–107)
CHOLESTEROL, FASTING: 224 MG/DL
CHOLESTEROL/HDL RATIO: 5.9
CO2: 27 MMOL/L (ref 20–31)
COLOR: YELLOW
COMMENT UA: ABNORMAL
CREAT SERPL-MCNC: 0.88 MG/DL (ref 0.5–0.9)
CREATININE URINE: 197.9 MG/DL (ref 28–217)
CRYSTALS, UA: ABNORMAL /HPF
DIFFERENTIAL TYPE: NORMAL
EOSINOPHILS RELATIVE PERCENT: 4 % (ref 0–4)
EPITHELIAL CELLS UA: ABNORMAL /HPF
FERRITIN: 118 UG/L (ref 13–150)
GFR AFRICAN AMERICAN: >60 ML/MIN
GFR NON-AFRICAN AMERICAN: >60 ML/MIN
GFR SERPL CREATININE-BSD FRML MDRD: ABNORMAL ML/MIN/{1.73_M2}
GFR SERPL CREATININE-BSD FRML MDRD: ABNORMAL ML/MIN/{1.73_M2}
GLUCOSE FASTING: 202 MG/DL (ref 70–99)
GLUCOSE URINE: NEGATIVE
HAV AB SERPL IA-ACNC: NONREACTIVE
HBV SURFACE AB TITR SER: <3.5 MIU/ML
HCT VFR BLD CALC: 43.4 % (ref 36–46)
HDLC SERPL-MCNC: 38 MG/DL
HEMOGLOBIN: 14.5 G/DL (ref 12–16)
HEPATITIS B CORE TOTAL ANTIBODY: REACTIVE
HEPATITIS C ANTIBODY: NONREACTIVE
HIV AG/AB: NONREACTIVE
IGG: 1161 MG/DL (ref 700–1600)
IGM: 72 MG/DL (ref 40–230)
IMMATURE GRANULOCYTES: NORMAL %
IRON SATURATION: 25 % (ref 20–55)
IRON: 81 UG/DL (ref 37–145)
KETONES, URINE: NEGATIVE
LDL CHOLESTEROL: 153 MG/DL (ref 0–130)
LEUKOCYTE ESTERASE, URINE: ABNORMAL
LYMPHOCYTES # BLD: 33 % (ref 24–44)
MCH RBC QN AUTO: 29.6 PG (ref 26–34)
MCHC RBC AUTO-ENTMCNC: 33.5 G/DL (ref 31–37)
MCV RBC AUTO: 88.5 FL (ref 80–100)
MICROALBUMIN/CREAT 24H UR: <12 MG/L
MICROALBUMIN/CREAT UR-RTO: NORMAL MCG/MG CREAT
MONOCYTES # BLD: 6 % (ref 1–7)
MUCUS: ABNORMAL
NITRITE, URINE: NEGATIVE
NRBC AUTOMATED: NORMAL PER 100 WBC
OTHER OBSERVATIONS UA: ABNORMAL
PDW BLD-RTO: 13.8 % (ref 11.5–14.9)
PH UA: 6 (ref 5–8)
PLATELET # BLD: 212 K/UL (ref 150–450)
PLATELET ESTIMATE: NORMAL
PMV BLD AUTO: 10 FL (ref 6–12)
POTASSIUM SERPL-SCNC: 4.4 MMOL/L (ref 3.7–5.3)
PROTEIN UA: NEGATIVE
RBC # BLD: 4.91 M/UL (ref 4–5.2)
RBC # BLD: NORMAL 10*6/UL
RBC UA: ABNORMAL /HPF
RENAL EPITHELIAL, UA: ABNORMAL /HPF
SEG NEUTROPHILS: 56 % (ref 36–66)
SEGMENTED NEUTROPHILS ABSOLUTE COUNT: 4.4 K/UL (ref 1.3–9.1)
SODIUM BLD-SCNC: 139 MMOL/L (ref 135–144)
SPECIFIC GRAVITY UA: 1.02 (ref 1–1.03)
TOTAL IRON BINDING CAPACITY: 328 UG/DL (ref 250–450)
TOTAL PROTEIN: 7.5 G/DL (ref 6.4–8.3)
TRICHOMONAS: ABNORMAL
TRIGLYCERIDE, FASTING: 163 MG/DL
TSH SERPL DL<=0.05 MIU/L-ACNC: 1.52 MIU/L (ref 0.3–5)
TSH SERPL DL<=0.05 MIU/L-ACNC: 1.54 MIU/L (ref 0.3–5)
TURBIDITY: ABNORMAL
UNSATURATED IRON BINDING CAPACITY: 247 UG/DL (ref 112–347)
URINE HGB: NEGATIVE
UROBILINOGEN, URINE: NORMAL
VLDLC SERPL CALC-MCNC: ABNORMAL MG/DL (ref 1–30)
WBC # BLD: 7.7 K/UL (ref 3.5–11)
WBC # BLD: NORMAL 10*3/UL
WBC UA: ABNORMAL /HPF
YEAST: ABNORMAL

## 2021-05-18 PROCEDURE — 83540 ASSAY OF IRON: CPT

## 2021-05-18 PROCEDURE — 86038 ANTINUCLEAR ANTIBODIES: CPT

## 2021-05-18 PROCEDURE — 86704 HEP B CORE ANTIBODY TOTAL: CPT

## 2021-05-18 PROCEDURE — 82103 ALPHA-1-ANTITRYPSIN TOTAL: CPT

## 2021-05-18 PROCEDURE — 84443 ASSAY THYROID STIM HORMONE: CPT

## 2021-05-18 PROCEDURE — 82784 ASSAY IGA/IGD/IGG/IGM EACH: CPT

## 2021-05-18 PROCEDURE — 6360000004 HC RX CONTRAST MEDICATION: Performed by: INTERNAL MEDICINE

## 2021-05-18 PROCEDURE — 82306 VITAMIN D 25 HYDROXY: CPT

## 2021-05-18 PROCEDURE — 81001 URINALYSIS AUTO W/SCOPE: CPT

## 2021-05-18 PROCEDURE — 36415 COLL VENOUS BLD VENIPUNCTURE: CPT

## 2021-05-18 PROCEDURE — 80061 LIPID PANEL: CPT

## 2021-05-18 PROCEDURE — 82570 ASSAY OF URINE CREATININE: CPT

## 2021-05-18 PROCEDURE — 87340 HEPATITIS B SURFACE AG IA: CPT

## 2021-05-18 PROCEDURE — 86317 IMMUNOASSAY INFECTIOUS AGENT: CPT

## 2021-05-18 PROCEDURE — 2580000003 HC RX 258: Performed by: INTERNAL MEDICINE

## 2021-05-18 PROCEDURE — 80053 COMPREHEN METABOLIC PANEL: CPT

## 2021-05-18 PROCEDURE — 74160 CT ABDOMEN W/CONTRAST: CPT

## 2021-05-18 PROCEDURE — 87389 HIV-1 AG W/HIV-1&-2 AB AG IA: CPT

## 2021-05-18 PROCEDURE — 82728 ASSAY OF FERRITIN: CPT

## 2021-05-18 PROCEDURE — 86256 FLUORESCENT ANTIBODY TITER: CPT

## 2021-05-18 PROCEDURE — 83516 IMMUNOASSAY NONANTIBODY: CPT

## 2021-05-18 PROCEDURE — 85025 COMPLETE CBC W/AUTO DIFF WBC: CPT

## 2021-05-18 PROCEDURE — 86708 HEPATITIS A ANTIBODY: CPT

## 2021-05-18 PROCEDURE — 86376 MICROSOMAL ANTIBODY EACH: CPT

## 2021-05-18 PROCEDURE — 83550 IRON BINDING TEST: CPT

## 2021-05-18 PROCEDURE — 82104 ALPHA-1-ANTITRYPSIN PHENO: CPT

## 2021-05-18 PROCEDURE — 82043 UR ALBUMIN QUANTITATIVE: CPT

## 2021-05-18 PROCEDURE — 86803 HEPATITIS C AB TEST: CPT

## 2021-05-18 PROCEDURE — 82390 ASSAY OF CERULOPLASMIN: CPT

## 2021-05-18 RX ORDER — SODIUM CHLORIDE 0.9 % (FLUSH) 0.9 %
10 SYRINGE (ML) INJECTION PRN
Status: DISCONTINUED | OUTPATIENT
Start: 2021-05-18 | End: 2021-05-21 | Stop reason: HOSPADM

## 2021-05-18 RX ORDER — 0.9 % SODIUM CHLORIDE 0.9 %
80 INTRAVENOUS SOLUTION INTRAVENOUS ONCE
Status: COMPLETED | OUTPATIENT
Start: 2021-05-18 | End: 2021-05-18

## 2021-05-18 RX ADMIN — SODIUM CHLORIDE 80 ML: 9 INJECTION, SOLUTION INTRAVENOUS at 10:37

## 2021-05-18 RX ADMIN — SODIUM CHLORIDE, PRESERVATIVE FREE 10 ML: 5 INJECTION INTRAVENOUS at 10:40

## 2021-05-18 RX ADMIN — IOPAMIDOL 100 ML: 755 INJECTION, SOLUTION INTRAVENOUS at 10:40

## 2021-05-19 ENCOUNTER — TELEPHONE (OUTPATIENT)
Dept: FAMILY MEDICINE CLINIC | Age: 46
End: 2021-05-19

## 2021-05-19 DIAGNOSIS — E78.2 MIXED HYPERLIPIDEMIA: ICD-10-CM

## 2021-05-19 LAB
ANTI-NUCLEAR ANTIBODY (ANA): NEGATIVE
HEPATITIS B SURFACE ANTIGEN: NONREACTIVE
MITOCHONDRIAL ANTIBODY: 0.6 U/ML (ref 0–4)
VITAMIN D 25-HYDROXY: 23.4 NG/ML (ref 30–100)

## 2021-05-19 RX ORDER — PRAVASTATIN SODIUM 20 MG
20 TABLET ORAL DAILY
Qty: 90 TABLET | Refills: 2 | Status: SHIPPED | OUTPATIENT
Start: 2021-05-19 | End: 2022-01-25

## 2021-05-20 LAB — LIVER-KIDNEY MICROSOMAL AB: NORMAL

## 2021-05-20 NOTE — TELEPHONE ENCOUNTER
Please let the patient know of the recent results. These can also be discussed further on the future visits. Patient's blood count is WNL    Lab Results   Component Value Date    WBC 7.7 05/18/2021    HGB 14.5 05/18/2021    HCT 43.4 05/18/2021    MCV 88.5 05/18/2021     05/18/2021    LYMPHOPCT 33 05/18/2021    RBC 4.91 05/18/2021    MCH 29.6 05/18/2021    MCHC 33.5 05/18/2021    RDW 13.8 05/18/2021       Patient's thyroid function is WNL    Lab Results   Component Value Date    TSH 1.52 05/18/2021       Patient's kidney function is WNL      Patient's one liver enzyme is elevated. If she drinks alcohol, stop. Stop taking alcohol. We can discuss further. .  Lab Results   Component Value Date     05/18/2021    K 4.4 05/18/2021     05/18/2021    CO2 27 05/18/2021    BUN 9 05/18/2021    CREATININE 0.88 05/18/2021    GLUCOSE 231 (H) 10/08/2019    CALCIUM 9.5 05/18/2021    PROT 7.5 05/18/2021    LABALBU 4.2 05/18/2021    BILITOT 0.47 05/18/2021    ALKPHOS 103 05/18/2021    AST 31 05/18/2021    ALT 51 (H) 05/18/2021    LABGLOM >60 05/18/2021    GFRAA >60 05/18/2021    GLOB NOT REPORTED 04/23/2018       Patient's urinalysis results WNL    Lab Results   Component Value Date    COLORU YELLOW 05/18/2021    NITRU NEGATIVE 05/18/2021    GLUCOSEU NEGATIVE 05/18/2021    KETUA NEGATIVE 05/18/2021    UROBILINOGEN Normal 05/18/2021    BILIRUBINUR NEGATIVE 05/18/2021    HGBUR NEGATIVE 05/18/2021    PROTEINU NEGATIVE 05/18/2021    PHUR 6.0 05/18/2021    LEUKOCYTESUR TRACE 05/18/2021       Vitamin D Screening  Please let the patient know that the patient's recent vitamin d level was insufficient. I will send an oral supplementation that needs to be taken weekly. We will be checking his levels on an annual basis from now on. We can discuss this condition further on her next visit.    Lab Results   Component Value Date/Time    VITD25 23.4 (L) 05/18/2021 09:11 AM         Patient's lipids test results Still elevated, I

## 2021-05-21 LAB
SMOOTH MUSCLE AB IGG TITER: NORMAL
SMOOTH MUSCLE ANTIBODY: 41 UNITS (ref 0–19)

## 2021-05-22 LAB
ALPHA-1 ANTITRYPSIN PHENOTYPE: NORMAL
ALPHA-1 ANTITRYPSIN: 146 MG/DL (ref 90–200)

## 2021-05-25 DIAGNOSIS — T21.21XA PARTIAL THICKNESS BURN OF CHEST WALL, INITIAL ENCOUNTER: Primary | ICD-10-CM

## 2021-05-25 DIAGNOSIS — T20.20XA PARTIAL THICKNESS BURN OF FACE, INITIAL ENCOUNTER: ICD-10-CM

## 2021-06-09 ENCOUNTER — OFFICE VISIT (OUTPATIENT)
Dept: GASTROENTEROLOGY | Age: 46
End: 2021-06-09
Payer: COMMERCIAL

## 2021-06-09 VITALS — BODY MASS INDEX: 35.15 KG/M2 | WEIGHT: 180 LBS

## 2021-06-09 DIAGNOSIS — K76.0 NAFLD (NONALCOHOLIC FATTY LIVER DISEASE): ICD-10-CM

## 2021-06-09 DIAGNOSIS — R10.9 CHRONIC ABDOMINAL PAIN: Primary | ICD-10-CM

## 2021-06-09 DIAGNOSIS — G89.29 CHRONIC ABDOMINAL PAIN: Primary | ICD-10-CM

## 2021-06-09 PROCEDURE — 99213 OFFICE O/P EST LOW 20 MIN: CPT | Performed by: INTERNAL MEDICINE

## 2021-06-09 PROCEDURE — G8417 CALC BMI ABV UP PARAM F/U: HCPCS | Performed by: INTERNAL MEDICINE

## 2021-06-09 PROCEDURE — G8427 DOCREV CUR MEDS BY ELIG CLIN: HCPCS | Performed by: INTERNAL MEDICINE

## 2021-06-09 PROCEDURE — 4004F PT TOBACCO SCREEN RCVD TLK: CPT | Performed by: INTERNAL MEDICINE

## 2021-06-09 ASSESSMENT — ENCOUNTER SYMPTOMS
WHEEZING: 0
DIARRHEA: 0
SORE THROAT: 0
ABDOMINAL PAIN: 1
COUGH: 1
BLOOD IN STOOL: 0
BACK PAIN: 0
ANAL BLEEDING: 0
ABDOMINAL DISTENTION: 1
TROUBLE SWALLOWING: 1
ALLERGIC/IMMUNOLOGIC NEGATIVE: 1
RECTAL PAIN: 0
CHOKING: 0
NAUSEA: 0
VOICE CHANGE: 0
VOMITING: 0
CONSTIPATION: 0
SINUS PRESSURE: 1

## 2021-06-09 NOTE — PROGRESS NOTES
GI CLINIC FOLLOW UP    INTERVAL HISTORY:   No referring provider defined for this encounter. Chief Complaint   Patient presents with    Elevated Hepatic Enzymes     Blood work done and CT scan    Abdominal Pain     Cental upper stomach, still present not so severe and she has learned to ignore it. Also new is s pain RLQ as well. HISTORY OF PRESENT ILLNESS: Irineo Balbuena is a 40 y.o. female abdominal pain. She continues to have right upper quadrant pain. Constant. Achy. No nausea or vomiting. She was able to lose around 5 pounds. Past Medical,Family, and Social History reviewed and does not contribute to the patient presentingcondition. Patient's PMH/PSH,SH,PSYCH Hx, MEDs, ALLERGIES, and ROS were all reviewed and updated in the appropriate sections.     PAST MEDICAL HISTORY:  Past Medical History:   Diagnosis Date    Abdominal pain     Antinuclear factor positive     Anxiety 1/16/2020    Arthritis     osteoarthritis    Autoimmune disease ([facility] [address]     unknown exactly, being worked up   [employer] severe obesity due to excess calories with serious comorbidity and body mass index (BMI) of 36.0 to 36.9 in adult [facility]) 7/27/2020    Depression     Diabetes mellitus ([facility] [address]     Edema     Environmental allergies     Epigastric pain     Fibromyalgia     Gastritis     Gastro-esophageal reflux disease without esophagitis 10/6/2017    Hepatic steatosis     Liver lesion     Lumbago     Other seasonal allergic rhinitis 10/6/2017    Plantar fasciitis     Primary fibromyalgia syndrome     PTSD (post-traumatic stress disorder)     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 , Disp: 400 g, Rfl: 2    pravastatin (PRAVACHOL) 20 MG tablet, Take 1 tablet by mouth daily, Disp: 90 tablet, Rfl: 2    metFORMIN (GLUCOPHAGE-XR) 500 MG extended release tablet, TAKE 2 TABLET BY MOUTH 2 TIMES DAILY, Disp: 120 tablet, Rfl: 2    Cholecalciferol (VITAMIN D3) 1.25 MG (67310 UT) CAPS, Take by mouth, Disp: , Rfl:     pregabalin (LYRICA) 25 MG capsule, Take 1 capsule by mouth 3 times daily for 30 days. , Disp: 90 capsule, Rfl: 3    nystatin (MYCOSTATIN) 138351 UNIT/GM powder, Apply topically 4 times daily. , Disp: 1 Bottle, Rfl: 5    empagliflozin (JARDIANCE) 10 MG tablet, Take 1 tablet by mouth daily, Disp: 30 tablet, Rfl: 1    fluticasone (FLONASE) 50 MCG/ACT nasal spray, INSTILL 1 SPRAY BY NASAL ROUTE 2 TIMES DAILY, Disp: 16 g, Rfl: 2    Handicap Placard [facility], by Does not apply route Expires December 2025, Disp: 1 each, Rfl: 0    ibuprofen (ADVIL;MOTRIN) 600 MG tablet, TAKE 1 TABLET BY MOUTH 4 TIMES DAILY AS NEEDED FOR PAIN, Disp: 180 tablet, Rfl: 1    fexofenadine (ALLEGRA) 180 MG tablet, Take 1 tablet by mouth daily, Disp: 90 tablet, Rfl: 5    melatonin 3 MG TABS tablet, Take 1 tablet by mouth nightly as needed (sleep), Disp: 30 tablet, Rfl: 3    cyclobenzaprine (FLEXERIL) 10 MG tablet, Take 1 tablet by mouth 3 times daily as needed, Disp: , Rfl:     aspirin (SM ASPIRIN ADULT LOW STRENGTH) 81 MG EC tablet, Take 1 tablet by mouth daily, Disp: , Rfl:     Elastic Bandages & Supports (AIRCAST SPORT ANKLE BRACE/RGHT) MISC, Wear all the time, Disp: 1 each, Rfl: 0    lidocaine (LIDODERM) 5 %, Place 1 patch onto the skin daily 12 hours on, 12 hours off., Disp: 30 patch, Rfl: 0    omeprazole (PRILOSEC) 20 MG delayed release capsule, TAKE 1 CAPSULE BY MOUTH ONCE DAILY AS NEEDED, Disp: , Rfl:     TRUEPLUS LANCETS 28G MISC, 1 (ONE) MISC MISC TWO TIMES DAILY, Disp: , Rfl:     SM MUCUS RELIEF MAX STRENGTH 1200 MG TB12, TAKE 1 (ONE) TABLET EVERY TWELVE HOURS, Disp: , Rfl:     EPINEPHrine (EPIPEN) 0.3 MG/0.3ML SOAJ injection, Use as directed, Disp: 2 each, Rfl: 0    TRUE METRIX BLOOD GLUCOSE TEST strip, PATIENT CHECKS SUGAR 2-3 TIMES DAILY AND PRN, Disp: , Rfl: 3    chlorhexidine (PERIDEX) 0.12 % solution, , Disp: , Rfl:     ALLERGIES:   Allergies   Allergen Reactions    Morphine Swelling    Penicillins Swelling    Amoxicillin     Hydrocodone-Acetaminophen     Oxycodone-Acetaminophen     Percocet [Oxycodone-Acetaminophen]     Trazodone Hcl Other (See Comments)    Ultram [Tramadol]     Vicodin [Hydrocodone-Acetaminophen]        FAMILY HISTORY:       Problem Relation Age of Onset    Breast Cancer Mother         age 27   Loretta Ally High Blood Pressure Mother     Diabetes Mother     High Blood Pressure Maternal Grandfather     Diabetes Maternal Grandfather          SOCIAL HISTORY:   Social History     Socioeconomic History    Marital status:      Spouse name: Not on file    Number of children: Not on file    Years of education: Not on file    Highest education level: Not on file   Occupational History    Not on file   Tobacco Use    Smoking status: Current Every Day Smoker     Packs/day: 0.50     Years: 27.00     Pack years: 13.50     Types: Cigarettes    Smokeless tobacco: Never Used   Substance and Sexual Activity    Alcohol use: No    Drug use: No    Sexual activity: Not on file   Other Topics Concern    Not on file   Social History Narrative    Not on file     Social Determinants of Health     Financial Resource Strain:     Difficulty of Paying Living Expenses:    Food Insecurity:     Worried About Running Out of Food in the Last Year:     Ran Out of Food in the Last Year:    Transportation Needs:     Lack of Transportation (Medical):      Lack of Transportation (Non-Medical):    Physical Activity:     Days of Exercise per Week:     Minutes of Exercise per Session:    Stress:     Feeling of Stress :    Social Connections:     Frequency of Communication with Friends and Family:     Frequency of Social Gatherings with Friends and Family:     Attends Hinduism Services:     Active Member of Clubs or Organizations:     Attends Club or Organization Meetings:     Marital Status:    Intimate Partner Violence:     Fear of Current or Ex-Partner:     Emotionally Abused:     Physically Abused:     Sexually Abused:        REVIEW OF SYSTEMS: A 12-point review of systemswas obtained and pertinent positives and negatives were enumerated above in the history of present illness. All other reviewed systems / symptoms were negative. Review of Systems   Constitutional: Positive for fatigue. Negative for appetite change and unexpected weight change. HENT: Positive for postnasal drip, sinus pressure and trouble swallowing. Negative for dental problem, sore throat and voice change. Eyes: Positive for visual disturbance. Respiratory: Positive for cough. Negative for choking and wheezing. Cardiovascular: Positive for leg swelling. Negative for chest pain and palpitations. Gastrointestinal: Positive for abdominal distention and abdominal pain. Negative for anal bleeding, blood in stool, constipation, diarrhea, nausea, rectal pain and vomiting. Endocrine: Negative. Genitourinary: Negative. Negative for difficulty urinating. Musculoskeletal: Positive for myalgias. Negative for arthralgias, back pain and gait problem. Allergic/Immunologic: Negative. Negative for environmental allergies and food allergies. Neurological: Negative. Negative for dizziness, weakness, light-headedness, numbness and headaches. Hematological: Negative. Does not bruise/bleed easily. Psychiatric/Behavioral: Negative. Negative for sleep disturbance. The patient is not nervous/anxious.             LABORATORY DATA: Reviewed  Lab Results Component Value Date    WBC 7.7 05/18/2021    HGB 14.5 05/18/2021    HCT 43.4 05/18/2021    MCV 88.5 05/18/2021     05/18/2021     05/18/2021    K 4.4 05/18/2021     05/18/2021    CO2 27 05/18/2021    BUN 9 05/18/2021    CREATININE 0.88 05/18/2021    LABALBU 4.2 05/18/2021    BILITOT 0.47 05/18/2021    ALKPHOS 103 05/18/2021    AST 31 05/18/2021    ALT 51 (H) 05/18/2021    INR 1.1 01/28/2019         Lab Results   Component Value Date    RBC 4.91 05/18/2021    HGB 14.5 05/18/2021    MCV 88.5 05/18/2021    MCH 29.6 05/18/2021    MCHC 33.5 05/18/2021    RDW 13.8 05/18/2021    MPV 10.0 05/18/2021    BASOPCT 1 05/18/2021    LYMPHSABS 2.50 05/18/2021    MONOSABS 0.40 05/18/2021    NEUTROABS 4.40 05/18/2021    EOSABS 0.30 05/18/2021    BASOSABS 0.10 05/18/2021         DIAGNOSTIC TESTING:     CT ABDOMEN W CONTRAST Additional Contrast? Radiologist Recommendation    Result Date: 5/18/2021  EXAMINATION: CT ABDOMEN WITH CONTRAST 5/18/2021 10:13 am TECHNIQUE: CT of the abdomen was performed with the administration of intravenous contrast. Multiplanar reformatted images are provided for review. Dose modulation, iterative reconstruction, and/or weight based adjustment of the mA/kV was utilized to reduce the radiation dose to as low as reasonably achievable. COMPARISON: CT abdomen with without IV contrast January 9, 2018. ultrasound 03/18/2021. HISTORY: ORDERING SYSTEM PROVIDED HISTORY: Abnormal CT scan, liver TECHNOLOGIST PROVIDED HISTORY: liver mass protocol Reason for Exam: liver mass protocol Acuity: Unknown Type of Exam: Unknown Additional signs and symptoms: PT STATES HISTORY OF FATTY LIVER, MID ABDOMINAL PAIN Relevant Medical/Surgical History: SURGERIES-HYSTERECTOMY FINDINGS: Lower Chest: Mosaic attenuation of the lung bases suggestive of air trapping. No pericardial or pleural effusions. Organs: Noncontrast images of the liver demonstrate no diffuse steatosis. No enhancing liver lesion is identified. Stable 6 mm cyst right lobe of the liver. A focal area of low attenuation is seen within segment 3 of the liver which is not clearly enhance on the arterial and portal venous phase imaging measuring 1.2 cm in greatest axial dimension. Hepatic and portal veins appear patent. Small polyp versus stone involving the gallbladder lumen measuring 4 mm, confirmed on the prior ultrasound study. Pancreas spleen and left adrenal gland appear unremarkable. Mild thickening involving the right adrenal gland without nodule. Kidneys demonstrate no evidence of stone, enhancing mass or hydronephrosis. Abdominal aorta appears normal in caliber. GI/Bowel: Stomach is grossly unremarkable. Visualized small bowel appears unremarkable. Visualized colon appears grossly unremarkable. Peritoneum/Retroperitoneum: No free air, free fluid or lymphadenopathy. Bones/Soft Tissues: Osseous structures demonstrate degenerative change. Abdominal wall demonstrates no acute findings. 1.  No acute intra-abdominal process. No enhancing liver lesion. 2. A focal area of low attenuation is seen within segment 3 of the liver which is not clearly enhance on the arterial or portal venous phase imaging measuring 1.2 cm in greatest axial dimension. Finding likely correlates to the hyperechoic lesion seen on the prior ultrasound study. Finding is likely benign such as a focal area of steatosis. This was not clearly seen on the 2018 CT study. As a conservative measure, repeat CT in 6 months is suggested to assess for stability. 3. Gallbladder polyp, confirmed on the prior ultrasound study. PHYSICAL EXAMINATION: Vital signs reviewed per the nursing documentation. There were no vitals taken for this visit. There is no height or weight on file to calculate BMI. Physical Exam      I personally reviewed the nurse's notes and documentation and I agree with her notes.     General: alert, appears stated age and cooperative Psych: Normal. and

## 2021-06-13 ASSESSMENT — ENCOUNTER SYMPTOMS
BACK PAIN: 0
CONSTIPATION: 0
ABDOMINAL PAIN: 0
DIARRHEA: 0
NAUSEA: 0
RESPIRATORY NEGATIVE: 1
COUGH: 0
SHORTNESS OF BREATH: 0

## 2021-06-13 NOTE — PROGRESS NOTES
a1c     [facility]  [facility]  [address] [facility] [address].  [address] [address]  Dept: [phone]     2021   Chief Complaint   Patient presents with    Diabetes     Eye exam at [facility] vision. HPI  Mulu Church (:  1975) is a 40 y.o. female is an established patient. Patient has a history of diabetes, hyperlipidemia, fibromyalgia, vitamin D deficiency, morbid obesity, seasonal allergic rhinitis. GERD, hepatic steatosis. DIABETES MELLITUS-  Patient has a  stable Diabetes Mellitus. Patient's recent hemoglobin A1c was 8.2% at her endocrinologist office. Lab Results   Component Value Date    LABA1C 8.7 2021   . Current therapy includes metformin. . Patient's dose was increased but unable to tolerate. Dose decreased to 500 mg.  Given some Jardiance to take just recently had this medication approved by insurance. Patient is responding poorly with this therapy. Patient reports home glucose monitoring as Stable readings. Patient denieshypoglycemia episodes such as{symptoms. Patient denies blurred vision. The patient has not seen an ophthalmologist with in one last year. The patient is  on ACE inhibitors. The patient has not a foot examination. The patient has not seen an podiatrist with in last year. Lab Results   Component Value Date/Time    LABA1C 8.7 2021 08:33 AM    LABA1C 9.2 (H) 2021 08:53 AM    LABA1C 6.9 2018 12:00 AM     HYPERLIPIDEMIA  Mulu Church is currently on pravastatin (Pravachol). Patient denies adverse reaction to this medication. Compliance with treatment thus far has been good. The patient is not known to have coexisting coronary artery disease.  The 10-year CVD risk score (D'Agostino, et al., 2008) is: 15.2%    Values used to calculate the score:      Age: 40 years      Sex: Female      Diabetic: Yes      Tobacco smoker: Yes      Systolic Blood Pressure: 408 mmHg      Is BP treated: No      HDL Cholesterol: 38 mg/dL      Total Cholesterol: 224 mg/dL  Lab Results   Component Value Date/Time    CHOLFAST 224 (H) 05/18/2021 09:11 AM    CHOL 193 10/08/2019 11:36 AM    HDL 38 (L) 05/18/2021 09:11 AM    LDLCHOLESTEROL 153 (H) 05/18/2021 09:11 AM    TRIGLYCFAST 163 (H) 05/18/2021 09:11 AM    CHOLHDLRATIO 5.9 (H) 05/18/2021 09:11 AM    TRIG 165 (H) 10/08/2019 11:36 AM    VLDL NOT REPORTED (H) 05/18/2021 09:11 AM     FIBROMYALGIA  Patient with a known general body aches due to fbromyalgia. Patient reported some worsening of body aches. Patient is not able to take any anti-inflammatory due to some drug insensitivity and GERD. Also. Patient have tried some gabapentin which did not work. Current therapy Lyrica. Patient reported mild relief with the therapy. We will go ahead and increase the Lyrica to 50 mg 3 times daily. Patient had not been able to take this medication during the day. Patient have also had Cymbalta sent in the past but was also denied. Patient complains of muscle aches especially with her increasing amount of anxiety and depression. GERD  Bishop Puentes  admits to GERD symptoms of prolonged duration. Reported symptoms include bilious reflux and heartburn. The patient denies dysphagia, vomiting. Patient is aware of some food triggers and habits that causes exacerbation of symptoms. Risk factors present for GERD include obesity and NSAID use. Current therapy Omeprazole. Therapy results in fair relief. ADJUSTMENT DISORDER/PTSD  Patient had some significant history of PTSD and was under the care of psychiatrist and's counselors. However, patient had finished the course of therapy for now. Patient was encouraged to follow-up with a PTSD counselor /EMDR. She was given a list of places that she can see. She will be trying to call this people up will be calling her office back for any other help she needs.   Patient is not currently on any therapy which is her choice since she has a lot of sensitivity issues. PHQ-2 Over the past 2 weeks, how often have you been bothered by any of the following problems? Little interest or pleasure in doing things: Not at all  Feeling down, depressed, or hopeless: Not at all  PHQ-2 Score: 0  PHQ-9 Over the past 2 weeks, how often have you been bothered by any of the following problems? PHQ-9 Total Score: 0    RIGHT UPPER QUADRANT PAIN/HEPATIC STEATOSIS  Patient complains of some right upper quadrant pain. Patient has had this pain for a while. Patient is being followed up by Tiara Marroquin. She was told to have hepatic steatosis and also some cyst on her liver. CT of the abdomen was repeated once it was told that were no changes. She continues to have some mild wrap right upper quadrant pain not associated with any food intake. Pain is mild and achy. No issues with nausea, vomiting, or diarrhea. Heather Mingo Ultrasound was done. Patient had a liver lesion that was found on ultrasound. Patient was referred to the gastroenterologist.  CAT scan was also done. Radiologist noted that it was benign but she does have some hepatic steatosis. VITAMIN D  Patient has a known Vitamin D Deficiency. she had a course of supplementation for 12 weeks. she is due to get levels check. We continue to discuss ways to manage this condition. We also discussed ways to improve the levels. Such as learning food groups that are high in Vitamin D. We also discuss that sun exposure is beneficial however, too much sun and sun damage can potentially result in skin cancer. Vit D, 25-Hydroxy   Date Value Ref Range Status   05/18/2021 23.4 (L) 30.0 - 100.0 ng/mL Final     Comment:        Reference Range:  Vitamin D status         Range   Deficiency              <20 ng/mL   Mild Deficiency       20-30 ng/mL   Sufficiency           ng/mL   Toxicity               >100 ng/mL        NONSEASONAL ALLERGY/EYE ALLERGY MULTIPLE ALLERGIES  Patient with a known seasonal allergy and recurrent sinusitis. She is currently on Allegra. She c/o sinus pressure, post nasal drip and non prod cough. Symptoms are seasonal. She reports worsening of symptoms. Patient has a known multiple allergies. Including medication, environment, and food. Patient is currently on Roopa Fass. She continues to have some mild rhinorrhea postnasal drip. We will continue this therapy and continue to monitor. Patient also has some bilateral eye swelling. This is also a chronic issue. She does well with [place] we will continue this medication. Patient is also requesting a refill on her EpiPen. OBESITY  Patient's BMI is Body mass index is 35.15 kg/m². kg/m2. BMI is increasing. Patient understands that this condition increases the patient's risk for chronic conditions. Patient advised to practice healthy eating habits. Diet should include plenty of fruits, vegetables, whole grains, lean protein, and low-fat dairy. Increase water consumption. Reduce consumption of dietary sugar and sugar-sweetened beverages. Increasing physical exercises at least 3-4 times a week. We will monitor progression of condition.      Patient Active Problem List   Diagnosis    Primary fibromyalgia syndrome    Antinuclear factor positive    Hepatic steatosis    Liver lesion    Gastritis    Elevated liver enzymes    Tobacco abuse    Anxiety    Type 2 diabetes mellitus without complication, without long-term current use of insulin (HCC)    Mixed hyperlipidemia    Gastro-esophageal reflux disease without esophagitis    Complete rotator cuff tear or rupture of right shoulder, not specified as traumatic    Nicotine dependence, cigarettes, uncomplicated    Other seasonal allergic rhinitis    Vitamin D deficiency    Pure hypercholesterolemia    Diabetes mellitus ([address]    Family history of malignant neoplasm of breast    Type 2 diabetes mellitus with diabetic polyneuropathy ([facility]    BMI 36.0-36.9,adult    Class 2 severe obesity due to excess ketotifen (ZADITOR) 0.025 % ophthalmic solution Place 1 drop into both eyes 2 times daily 1 Bottle 2    naproxen (NAPROSYN) 500 MG tablet Take 1 tablet by mouth 2 times daily (with meals) 180 tablet 1    silver sulfADIAZINE (SILVADENE) 1 % cream Apply topically daily. 400 g 2    pravastatin (PRAVACHOL) 20 MG tablet Take 1 tablet by mouth daily 90 tablet 2    metFORMIN (GLUCOPHAGE-XR) 500 MG extended release tablet TAKE 2 TABLET BY MOUTH 2 TIMES DAILY 120 tablet 2    Cholecalciferol (VITAMIN D3) 1.25 MG (02259 UT) CAPS Take by mouth      nystatin (MYCOSTATIN) 174238 UNIT/GM powder Apply topically 4 times daily. 1 Bottle 5    empagliflozin (JARDIANCE) 10 MG tablet Take 1 tablet by mouth daily 30 tablet 1    fluticasone (FLONASE) 50 MCG/ACT nasal spray INSTILL 1 SPRAY BY NASAL ROUTE 2 TIMES DAILY 16 g 2    Handicap Placard MISC by Does not apply route Expires December 2025 1 each 0    fexofenadine (ALLEGRA) 180 MG tablet Take 1 tablet by mouth daily 90 tablet 5    melatonin 3 MG TABS tablet Take 1 tablet by mouth nightly as needed (sleep) 30 tablet 3    cyclobenzaprine (FLEXERIL) 10 MG tablet Take 1 tablet by mouth 3 times daily as needed      Elastic Bandages & Supports (AIRCAST SPORT ANKLE BRACE/RGHT) MISC Wear all the time 1 each 0    lidocaine (LIDODERM) 5 % Place 1 patch onto the skin daily 12 hours on, 12 hours off. 30 patch 0    TRUEPLUS LANCETS 28G MISC 1 (ONE) MISC MISC TWO TIMES DAILY      TRUE METRIX BLOOD GLUCOSE TEST strip PATIENT CHECKS SUGAR 2-3 TIMES DAILY AND PRN  3    chlorhexidine (PERIDEX) 0.12 % solution        No current facility-administered medications for this visit. Review of Systems   Constitutional: Negative for activity change, chills and fever. HENT: Negative for congestion, hearing loss and postnasal drip. Eyes: Negative for visual disturbance. Respiratory: Negative. Negative for cough and shortness of breath. Cardiovascular: Negative.   Negative for chest pain and palpitations. Gastrointestinal: Negative for abdominal pain, constipation, diarrhea and nausea. Endocrine: Negative for cold intolerance. Genitourinary: Negative for dysuria and enuresis. Musculoskeletal: Positive for arthralgias and myalgias. Negative for back pain, gait problem and joint swelling. General myalgias   Skin: Negative for rash. Allergic/Immunologic: Positive for environmental allergies and food allergies. Neurological: Positive for numbness. Negative for weakness, light-headedness and headaches. Hematological: Does not bruise/bleed easily. Psychiatric/Behavioral: Positive for sleep disturbance. Negative for suicidal ideas. The patient is nervous/anxious. Prior to Visit Medications    Medication Sig Taking? Authorizing Provider   EPINEPHrine (EPIPEN) 0.3 MG/0.3ML SOAJ injection Use as directed Yes DAV Cuevas CNP   omeprazole (PRILOSEC) 20 MG delayed release capsule TAKE 1 CAPSULE BY MOUTH ONCE DAILY AS NEEDED Yes DAV Cuevas CNP   pregabalin (LYRICA) 25 MG capsule Take 2 capsules by mouth 3 times daily for 30 days. Yes DAV Cuevas CNP   ketotifen (ZADITOR) 0.025 % ophthalmic solution Place 1 drop into both eyes 2 times daily Yes DAV Cuevas CNP   naproxen (NAPROSYN) 500 MG tablet Take 1 tablet by mouth 2 times daily (with meals) Yes DAV Cuevas CNP   silver sulfADIAZINE (SILVADENE) 1 % cream Apply topically daily. Yes DAV Cuevas CNP   pravastatin (PRAVACHOL) 20 MG tablet Take 1 tablet by mouth daily Yes DAV Cuevas CNP   metFORMIN (GLUCOPHAGE-XR) 500 MG extended release tablet TAKE 2 TABLET BY MOUTH 2 TIMES DAILY Yes DAV Cuevas CNP   Cholecalciferol (VITAMIN D3) 1.25 MG (86621 UT) CAPS Take by mouth Yes Historical Provider, MD   nystatin (MYCOSTATIN) 615503 UNIT/GM powder Apply topically 4 times daily.  Yes DAV Cuevas CNP   empagliflozin HENT:      Right Ear: Tympanic membrane and ear canal normal. No tenderness. No middle ear effusion. Left Ear: No mastoid tenderness. Nose: Mucosal edema present. No congestion or rhinorrhea. Right Sinus: Maxillary sinus tenderness and frontal sinus tenderness present. Left Sinus: Maxillary sinus tenderness and frontal sinus tenderness present. Mouth/Throat:      Mouth: Mucous membranes are pale. Pharynx: Uvula midline. No oropharyngeal exudate or posterior oropharyngeal erythema. Eyes:      General: Lids are normal. No scleral icterus. Right eye: No discharge. Pupils: Pupils are equal, round, and reactive to light. Neck:      Trachea: No tracheal tenderness. Cardiovascular:      Rate and Rhythm: Normal rate and regular rhythm. Pulmonary:      Effort: Pulmonary effort is normal.      Breath sounds: Normal breath sounds. No stridor. Abdominal:      General: Abdomen is protuberant. Bowel sounds are normal.      Palpations: Abdomen is soft. Tenderness: There is no abdominal tenderness. Comments: obese   Musculoskeletal:         General: Normal range of motion. Comments: Multiple tender points, symmetric:  -under lower end of SCM muscle  -suboccipital  -mid upper trapezius muscle  -supraspinatus  -2nd costochondral junction  -2 cm distal of lateral epicondyle  -greater trochanter  -medial fat pad of the knee  -upper outer quadrant of buttocks. Lymphadenopathy:      Cervical: No cervical adenopathy. Skin:     General: Skin is dry. Neurological:      Mental Status: She is alert and oriented to person, place, and time. Psychiatric:         Mood and Affect: Mood is anxious. Speech: Speech is not rapid and pressured. Behavior: Behavior normal.         Thought Content: Thought content does not include homicidal or suicidal ideation. Judgment: Inappropriate judgment: .diag. ASSESSMENT/PLAN:  1.  Type 2 diabetes mellitus with diabetic polyneuropathy, without long-term current use of insulin (HCC)  Stable  Continue therapy. We will continue to monitor Hemoglobin A1c   DISCUSSED and ADVISED TO:  Continue to check Glucose levels at home. Report increased and low levels as discussed. Decrease carbohydrates, sugary drinks, desserts in your diet. Exercise regularly, as tolerated. Try to lose weight.      - POCT glycosylated hemoglobin (Hb A1C)    2. Mixed hyperlipidemia  Stable  Continue therapy. Advised to decrease the consumption of red meats, fried foods, trans fats, sweets, sugary beverages. Advised to increase fish, vegetables, and fruits consumption. Advised to add fiber or OTC supplements in diet. Discussed weight loss which will result in improvement of lipids levels. Advised to increase daily physical activities and add regular exercises. 3. Primary fibromyalgia syndrome  Failure to Improve  Lyrica increased  DISCUSSED AND ADVISED TO:  Exercise often. Get a good night's sleep. Make healthy changes to diet. Quit smoking   Try to reduce stress  Carefully take medications as discussed  Report for worsening symptoms          - pregabalin (LYRICA) 25 MG capsule; Take 2 capsules by mouth 3 times daily for 30 days. Dispense: 90 capsule; Refill: 0  - naproxen (NAPROSYN) 500 MG tablet; Take 1 tablet by mouth 2 times daily (with meals)  Dispense: 180 tablet; Refill: 1    4. Lumbar radiculopathy  Failure to Improve  Continue current therapy. DISCUSSED AND ADVISED TO:  Use heat packs 15 to 20 mins every 2-3 hours. Do some back stretches as tolerated. Refer to hand out for instructions. Call for worsening, numbness, weakness.      - naproxen (NAPROSYN) 500 MG tablet; Take 1 tablet by mouth 2 times daily (with meals)  Dispense: 180 tablet; Refill: 1    5. Gastro-esophageal reflux disease without esophagitis  Stable  Continue therapy  DISCUSSED AND ADVISED TO:  Avoid food triggers.   Stop eating large meals close to bedtime  Don't eat meals too close to bedtime  Avoid ASA, NSAID's, caffeine, peppermints, alcohol and tobacco.  Report for worsening symptoms. - omeprazole (PRILOSEC) 20 MG delayed release capsule; TAKE 1 CAPSULE BY MOUTH ONCE DAILY AS NEEDED  Dispense: 90 capsule; Refill: 2    6. Moderate episode of recurrent major depressive disorder (HCC)  Stable  Continue current therapy. DISCUSSED and ADVISED TO:  Not stopping medication suddenly. See the specialist as discussed. Report for feelings of SI, HI, and hallucinations. Go to the ER for increasing urge to hurt yourself. 7. Vitamin D deficiency  Continue Vitamin D supplementation  DISCUSSED AND ADVISED TO:  Foods that contain a lot of vitamin D includes [place], tuna, and mackerel. Cheese, egg yolks, and beef liver have small amounts of vit D.  Milk, soy drinks, orange juice, yogurt, margarine, and some kinds of cereal have vitamin D added to them. Continue to use sunblock when out in the sun to prevent skin cancer. 8. Allergic eye reaction  Failure to Improve  Start eye drops  - ketotifen (ZADITOR) 0.025 % ophthalmic solution; Place 1 drop into both eyes 2 times daily  Dispense: 1 Bottle; Refill: 2    9. Other seasonal allergic rhinitis  Failure to Improve  Continue with the same therapy   ADVISED TO:  Avoid known allergens/irritants. Stop smoking or avoid second hand smoke. Stay hydrated. Report for worsening symptoms      10. Multiple allergies  Stable    - EPINEPHrine (EPIPEN) 0.3 MG/0.3ML SOAJ injection; Use as directed  Dispense: 2 each; Refill: 0    11. Class 2 severe obesity with serious comorbidity and body mass index (BMI) of 35.0 to 35.9 in adult, unspecified obesity type ([address]  BMI increasing  DISCUSSED AND ADVISED TO:  Eat a low-fat and low carbohydrates diet. Avoid fried foods especially fast food. Choose healthier options for snacks. Have 5-6 servings of fruits and vegetables per day. Cut down on eating processed food.   Add 30 speech-recognition program. However, inadvertent computerized transcription errors may be present. Although every effort was made to ensure accuracy, no guarantees can be provided that every mistake has been identified and corrected by editing   An electronic signature was used to authenticate this note.   --DAV Guerrero - CNP on 6/14/2021 at 12:44 PM

## 2021-06-14 ENCOUNTER — OFFICE VISIT (OUTPATIENT)
Dept: FAMILY MEDICINE CLINIC | Age: 46
End: 2021-06-14
Payer: COMMERCIAL

## 2021-06-14 VITALS
DIASTOLIC BLOOD PRESSURE: 78 MMHG | TEMPERATURE: 98.1 F | SYSTOLIC BLOOD PRESSURE: 124 MMHG | BODY MASS INDEX: 35.34 KG/M2 | HEART RATE: 72 BPM | HEIGHT: 60 IN | OXYGEN SATURATION: 98 % | WEIGHT: 180 LBS

## 2021-06-14 DIAGNOSIS — E11.42 TYPE 2 DIABETES MELLITUS WITH DIABETIC POLYNEUROPATHY, WITHOUT LONG-TERM CURRENT USE OF INSULIN (HCC): Primary | ICD-10-CM

## 2021-06-14 DIAGNOSIS — M54.16 LUMBAR RADICULOPATHY: ICD-10-CM

## 2021-06-14 DIAGNOSIS — F33.1 MODERATE EPISODE OF RECURRENT MAJOR DEPRESSIVE DISORDER (HCC): ICD-10-CM

## 2021-06-14 DIAGNOSIS — J30.2 OTHER SEASONAL ALLERGIC RHINITIS: ICD-10-CM

## 2021-06-14 DIAGNOSIS — H57.9 ALLERGIC EYE REACTION: ICD-10-CM

## 2021-06-14 DIAGNOSIS — M79.7 PRIMARY FIBROMYALGIA SYNDROME: ICD-10-CM

## 2021-06-14 DIAGNOSIS — Z88.9 MULTIPLE ALLERGIES: ICD-10-CM

## 2021-06-14 DIAGNOSIS — K21.9 GASTRO-ESOPHAGEAL REFLUX DISEASE WITHOUT ESOPHAGITIS: ICD-10-CM

## 2021-06-14 DIAGNOSIS — E78.2 MIXED HYPERLIPIDEMIA: ICD-10-CM

## 2021-06-14 DIAGNOSIS — E55.9 VITAMIN D DEFICIENCY: ICD-10-CM

## 2021-06-14 DIAGNOSIS — E66.01 CLASS 2 SEVERE OBESITY WITH SERIOUS COMORBIDITY AND BODY MASS INDEX (BMI) OF 35.0 TO 35.9 IN ADULT, UNSPECIFIED OBESITY TYPE (HCC): ICD-10-CM

## 2021-06-14 LAB — HBA1C MFR BLD: 8.7 %

## 2021-06-14 PROCEDURE — G8417 CALC BMI ABV UP PARAM F/U: HCPCS | Performed by: FAMILY MEDICINE

## 2021-06-14 PROCEDURE — 3052F HG A1C>EQUAL 8.0%<EQUAL 9.0%: CPT | Performed by: FAMILY MEDICINE

## 2021-06-14 PROCEDURE — G8427 DOCREV CUR MEDS BY ELIG CLIN: HCPCS | Performed by: FAMILY MEDICINE

## 2021-06-14 PROCEDURE — 2022F DILAT RTA XM EVC RTNOPTHY: CPT | Performed by: FAMILY MEDICINE

## 2021-06-14 PROCEDURE — 83036 HEMOGLOBIN GLYCOSYLATED A1C: CPT | Performed by: FAMILY MEDICINE

## 2021-06-14 PROCEDURE — 4004F PT TOBACCO SCREEN RCVD TLK: CPT | Performed by: FAMILY MEDICINE

## 2021-06-14 PROCEDURE — 99214 OFFICE O/P EST MOD 30 MIN: CPT | Performed by: FAMILY MEDICINE

## 2021-06-14 RX ORDER — KETOTIFEN FUMARATE 0.35 MG/ML
1 SOLUTION/ DROPS OPHTHALMIC 2 TIMES DAILY
Qty: 1 BOTTLE | Refills: 2 | Status: SHIPPED | OUTPATIENT
Start: 2021-06-14 | End: 2021-08-12

## 2021-06-14 RX ORDER — OMEPRAZOLE 20 MG/1
CAPSULE, DELAYED RELEASE ORAL
Qty: 90 CAPSULE | Refills: 2 | Status: SHIPPED | OUTPATIENT
Start: 2021-06-14 | End: 2022-01-25

## 2021-06-14 RX ORDER — NAPROXEN 500 MG/1
500 TABLET ORAL 2 TIMES DAILY WITH MEALS
Qty: 180 TABLET | Refills: 1 | Status: SHIPPED | OUTPATIENT
Start: 2021-06-14 | End: 2021-12-07

## 2021-06-14 RX ORDER — PREGABALIN 25 MG/1
50 CAPSULE ORAL 3 TIMES DAILY
Qty: 90 CAPSULE | Refills: 0 | Status: SHIPPED | OUTPATIENT
Start: 2021-06-14 | End: 2021-07-19

## 2021-06-14 RX ORDER — EPINEPHRINE 0.3 MG/.3ML
INJECTION SUBCUTANEOUS
Qty: 2 EACH | Refills: 0 | Status: SHIPPED | OUTPATIENT
Start: 2021-06-14 | End: 2022-04-18

## 2021-06-14 SDOH — ECONOMIC STABILITY: FOOD INSECURITY: WITHIN THE PAST 12 MONTHS, YOU WORRIED THAT YOUR FOOD WOULD RUN OUT BEFORE YOU GOT MONEY TO BUY MORE.: NEVER TRUE

## 2021-06-14 SDOH — ECONOMIC STABILITY: FOOD INSECURITY: WITHIN THE PAST 12 MONTHS, THE FOOD YOU BOUGHT JUST DIDN'T LAST AND YOU DIDN'T HAVE MONEY TO GET MORE.: NEVER TRUE

## 2021-06-14 ASSESSMENT — PATIENT HEALTH QUESTIONNAIRE - PHQ9
SUM OF ALL RESPONSES TO PHQ9 QUESTIONS 1 & 2: 0
SUM OF ALL RESPONSES TO PHQ QUESTIONS 1-9: 0
2. FEELING DOWN, DEPRESSED OR HOPELESS: 0
SUM OF ALL RESPONSES TO PHQ QUESTIONS 1-9: 0
SUM OF ALL RESPONSES TO PHQ QUESTIONS 1-9: 0
1. LITTLE INTEREST OR PLEASURE IN DOING THINGS: 0

## 2021-06-14 ASSESSMENT — SOCIAL DETERMINANTS OF HEALTH (SDOH): HOW HARD IS IT FOR YOU TO PAY FOR THE VERY BASICS LIKE FOOD, HOUSING, MEDICAL CARE, AND HEATING?: NOT VERY HARD

## 2021-06-14 NOTE — PROGRESS NOTES
Visit Information    Have you changed or started any medications since your last visit including any over-the-counter medicines, vitamins, or herbal medicines? no   Are you having any side effects from any of your medications? -  no  Have you stopped taking any of your medications? Is so, why? -  no    Have you seen any other physician or provider since your last visit? Yes - Records Obtained  Have you had any other diagnostic tests since your last visit? Yes - Records Obtained  Have you been seen in the emergency room and/or had an admission to a hospital since we last saw you? No  Have you had your routine dental cleaning in the past 6 months? no    Have you activated your [employer] account? If not, what are your barriers?  Yes     Patient Care Team:  DAV Cuevas CNP as PCP - General (Family Medicine)  DAV Cuevas CNP as PCP - [facility] Provider    Medical History Review  Past Medical, Family, and Social History reviewed and does contribute to the patient presenting condition    Health Maintenance   Topic Date Due    Pneumococcal 0-64 years Vaccine (1 of 2 - PPSV23) Never done    Diabetic foot exam  Never done    Diabetic retinal exam  Never done    COVID-19 Vaccine (1) Never done    Hepatitis B vaccine (1 of 3 - Risk 3-dose series) Never done    DTaP/Tdap/Td vaccine (1 - Tdap) Never done    Cervical cancer screen  Never done    Breast cancer screen  Never done    A1C test (Diabetic or Prediabetic)  06/18/2021    Flu vaccine (Season Ended) 09/01/2021    Diabetic microalbuminuria test  05/18/2022    Lipid screen  05/18/2022    Hepatitis C screen  Completed    HIV screen  Completed    Hepatitis A vaccine  Aged Out    Hib vaccine  Aged Out    Meningococcal (ACWY) vaccine  Aged Out

## 2021-06-14 NOTE — PATIENT INSTRUCTIONS
[email or web address]    [address]  [email or web address]_[email or web address]:[email or web address]    [address]  [email or web address]:[email or web address]

## 2021-07-15 DIAGNOSIS — J30.2 OTHER SEASONAL ALLERGIC RHINITIS: ICD-10-CM

## 2021-07-15 RX ORDER — FEXOFENADINE HCL 180 MG/1
180 TABLET ORAL DAILY
Qty: 90 TABLET | Refills: 5 | Status: SHIPPED | OUTPATIENT
Start: 2021-07-15 | End: 2022-01-17 | Stop reason: SDUPTHER

## 2021-07-19 DIAGNOSIS — M79.7 PRIMARY FIBROMYALGIA SYNDROME: ICD-10-CM

## 2021-07-19 RX ORDER — PREGABALIN 25 MG/1
CAPSULE ORAL
Qty: 180 CAPSULE | Refills: 0 | Status: SHIPPED | OUTPATIENT
Start: 2021-07-19 | End: 2021-08-12

## 2021-07-19 NOTE — TELEPHONE ENCOUNTER
Please Approve or Refuse.   Send to Pharmacy per Pt's Request:     Next Visit Date:  9/14/2021   Last Visit Date: 6/14/2021    Hemoglobin A1C (%)   Date Value   06/14/2021 8.7   03/18/2021 9.2 (H)   06/11/2018 6.9             ( goal A1C is < 7)   BP Readings from Last 3 Encounters:   06/14/21 124/78   03/15/21 110/80   08/14/20 127/74          (goal 120/80)  BUN   Date Value Ref Range Status   05/18/2021 9 6 - 20 mg/dL Final     CREATININE   Date Value Ref Range Status   05/18/2021 0.88 0.50 - 0.90 mg/dL Final     Potassium   Date Value Ref Range Status   05/18/2021 4.4 3.7 - 5.3 mmol/L Final

## 2021-08-02 DIAGNOSIS — E11.42 TYPE 2 DIABETES MELLITUS WITH DIABETIC POLYNEUROPATHY, WITHOUT LONG-TERM CURRENT USE OF INSULIN (HCC): ICD-10-CM

## 2021-08-02 RX ORDER — METFORMIN HYDROCHLORIDE 500 MG/1
TABLET, EXTENDED RELEASE ORAL
Qty: 120 TABLET | Refills: 2 | Status: SHIPPED | OUTPATIENT
Start: 2021-08-02 | End: 2021-10-25

## 2021-08-02 RX ORDER — EMPAGLIFLOZIN 10 MG/1
1 TABLET, FILM COATED ORAL DAILY
Qty: 30 TABLET | Refills: 1 | Status: SHIPPED | OUTPATIENT
Start: 2021-08-02 | End: 2021-08-30

## 2021-08-12 DIAGNOSIS — H57.9 ALLERGIC EYE REACTION: ICD-10-CM

## 2021-08-12 DIAGNOSIS — M79.7 PRIMARY FIBROMYALGIA SYNDROME: ICD-10-CM

## 2021-08-12 DIAGNOSIS — T21.21XA PARTIAL THICKNESS BURN OF CHEST WALL, INITIAL ENCOUNTER: ICD-10-CM

## 2021-08-12 DIAGNOSIS — T20.20XA PARTIAL THICKNESS BURN OF FACE, INITIAL ENCOUNTER: ICD-10-CM

## 2021-08-12 RX ORDER — PREGABALIN 25 MG/1
CAPSULE ORAL
Qty: 180 CAPSULE | Refills: 0 | Status: SHIPPED | OUTPATIENT
Start: 2021-08-12 | End: 2021-09-13

## 2021-08-12 RX ORDER — KETOTIFEN FUMARATE 0.35 MG/ML
1 SOLUTION/ DROPS OPHTHALMIC 2 TIMES DAILY
Qty: 5 ML | Refills: 0 | Status: SHIPPED | OUTPATIENT
Start: 2021-08-12

## 2021-08-12 NOTE — TELEPHONE ENCOUNTER
Please let her know that we need to do a urine drug screen on her due to the Lyrica prescription. There is a guideline and we need to follow from now on. Drug screen should be clear of any illicit drugs and recreational drugs like marijuana in order for us to continue prescribing this for her. We will go ahead and order this test.  However, we will refill this prescription for this month.

## 2021-08-16 RX ORDER — CALCIUM CITRATE/VITAMIN D3 200MG-6.25
TABLET ORAL
Qty: 100 EACH | Refills: 5 | Status: SHIPPED | OUTPATIENT
Start: 2021-08-16 | End: 2022-06-01

## 2021-08-30 DIAGNOSIS — E11.42 TYPE 2 DIABETES MELLITUS WITH DIABETIC POLYNEUROPATHY, WITHOUT LONG-TERM CURRENT USE OF INSULIN (HCC): ICD-10-CM

## 2021-08-30 RX ORDER — EMPAGLIFLOZIN 10 MG/1
1 TABLET, FILM COATED ORAL DAILY
Qty: 30 TABLET | Refills: 1 | Status: SHIPPED | OUTPATIENT
Start: 2021-08-30 | End: 2021-11-22

## 2021-09-13 DIAGNOSIS — M79.7 PRIMARY FIBROMYALGIA SYNDROME: ICD-10-CM

## 2021-09-13 RX ORDER — PREGABALIN 25 MG/1
CAPSULE ORAL
Qty: 180 CAPSULE | Refills: 0 | Status: SHIPPED | OUTPATIENT
Start: 2021-09-13 | End: 2021-10-11

## 2021-10-07 RX ORDER — ISOPROPYL ALCOHOL 0.75 G/1
SWAB TOPICAL
Qty: 100 EACH | Refills: 5 | Status: SHIPPED | OUTPATIENT
Start: 2021-10-07 | End: 2022-02-23

## 2021-10-11 DIAGNOSIS — M79.7 PRIMARY FIBROMYALGIA SYNDROME: Primary | ICD-10-CM

## 2021-10-11 DIAGNOSIS — M79.7 PRIMARY FIBROMYALGIA SYNDROME: ICD-10-CM

## 2021-10-11 RX ORDER — PREGABALIN 25 MG/1
CAPSULE ORAL
Qty: 180 CAPSULE | Refills: 0 | Status: SHIPPED | OUTPATIENT
Start: 2021-10-11 | End: 2021-10-29

## 2021-10-11 NOTE — TELEPHONE ENCOUNTER
Please Approve or Refuse.   Send to Pharmacy per Pt's Request:      Next Visit Date:  10/29/2021   Last Visit Date: 6/14/2021    Hemoglobin A1C (%)   Date Value   06/14/2021 8.7   03/18/2021 9.2 (H)   06/11/2018 6.9             ( goal A1C is < 7)   BP Readings from Last 3 Encounters:   06/14/21 124/78   03/15/21 110/80   08/14/20 127/74          (goal 120/80)  BUN   Date Value Ref Range Status   05/18/2021 9 6 - 20 mg/dL Final     CREATININE   Date Value Ref Range Status   05/18/2021 0.88 0.50 - 0.90 mg/dL Final     Potassium   Date Value Ref Range Status   05/18/2021 4.4 3.7 - 5.3 mmol/L Final

## 2021-10-25 DIAGNOSIS — E11.42 TYPE 2 DIABETES MELLITUS WITH DIABETIC POLYNEUROPATHY, WITHOUT LONG-TERM CURRENT USE OF INSULIN (HCC): ICD-10-CM

## 2021-10-25 RX ORDER — METFORMIN HYDROCHLORIDE 500 MG/1
TABLET, EXTENDED RELEASE ORAL
Qty: 120 TABLET | Refills: 2 | Status: SHIPPED | OUTPATIENT
Start: 2021-10-25 | End: 2022-01-21

## 2021-10-26 ASSESSMENT — ENCOUNTER SYMPTOMS
SHORTNESS OF BREATH: 0
DIARRHEA: 0
RESPIRATORY NEGATIVE: 1
CONSTIPATION: 0
ABDOMINAL PAIN: 0
COUGH: 0
NAUSEA: 0

## 2021-10-27 NOTE — PROGRESS NOTES
a1c     [facility]  [facility]  [address]  [address] [address]  Dept: [phone]     10/29/2021   Chief Complaint   Patient presents with    Discuss Medications     LYRICA/STOPPED TAKING THE PAST 2-3 MONTHS HAVING CONCERNS WITH WEIGHT GAIN    Immunizations     NO TO PPSV23/NO TO FLU     HPI  Verona Coelho (:  1975) is a 40 y.o. female is an established patient. Patient has a history of diabetes, hyperlipidemia, fibromyalgia, lumbar radiculopathy, by psychosis, depression, shoulder arthritis,       Stopped Lyrica- gaining weight  180 lbs   stopped it  Back at work  [email or web address] DIABETES MELLITUS  Patient has a  stable Diabetes Mellitus. Patient states that she changed diet-she is also exercising. Patient's recent hemoglobin A1c was 8.2% at her endocrinologist office. Lab Results   Component Value Date    LABA1C 8.7 2021   . Current therapy includes metformin, Jardiance-was recently approved is only been taking for 2 weeks. Patient's dose was increased but unable to tolerate. Dose decreased to 500 mg.  Given some Jardiance to take just recently had this medication approved by insurance. Patient is responding poorly with this therapy. Patient reports home glucose monitoring as Stable readings. 140- 150. Patient denieshypoglycemia episodes such as{symptoms. Patient denies blurred vision. The patient has not seen an ophthalmologist with in one last year. The patient is  on ACE inhibitors. The patient has not a foot examination. The patient has not seen an podiatrist with in last year. Lab Results   Component Value Date/Time    LABA1C 8.7 2021 08:33 AM    LABA1C 9.2 (H) 2021 08:53 AM    LABA1C 6.9 2018 12:00 AM     HYPERLIPIDEMIA  Verona Coelho is currently on pravastatin (Pravachol). She has been on this medicine from patient denies adverse reaction to this medication. Compliance with treatment thus far has been good. The patient is not known to have coexisting coronary artery disease. The 10-year CVD risk score (SUKHWINDER'Agostino, et al., 2008) is: 16.5%    Values used to calculate the score:      Age: 40 years      Sex: Female      Diabetic: Yes      Tobacco smoker: Yes      Systolic Blood Pressure: 660 mmHg      Is BP treated: No      HDL Cholesterol: 38 mg/dL      Total Cholesterol: 224 mg/dL  Lab Results   Component Value Date/Time    CHOLFAST 224 (H) 05/18/2021 09:11 AM    CHOL 193 10/08/2019 11:36 AM    HDL 38 (L) 05/18/2021 09:11 AM    LDLCHOLESTEROL 153 (H) 05/18/2021 09:11 AM    TRIGLYCFAST 163 (H) 05/18/2021 09:11 AM    CHOLHDLRATIO 5.9 (H) 05/18/2021 09:11 AM    TRIG 165 (H) 10/08/2019 11:36 AM    VLDL NOT REPORTED (H) 05/18/2021 09:11 AM     FIBROMYALGIA  Patient with a known general body aches due to fbromyalgia. Patient reported some worsening of body aches. Patient is not able to take any anti-inflammatory due to some drug insensitivity and GERD. Also. Patient have tried some gabapentin which did not work. Current therapy Lyrica-- stopped we will be sending Cymbalta. Due to weight gain  Since stopping the medication patient now has abdominal pain especially since she started back to working. 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 ARTHRITIS  Patient has a known arthritis of both shoulders. She has had some bilateral spasms advised to report for worsening symptoms tightening of both trapezius muscles for a long time. She has had massages in the past which helped. She is requesting physical therapy massage therapy referral today which will be provided for her. Patient will also be sent for physical therapy sessions for her fibromyalgia. ADJUSTMENT DISORDER/PTSD  Patient appears to have more anxiety related to finances, work, health and relationships. We did not discuss her mental health conditions today depression screening.   PREVIOUS NOTES  Patient had some significant history of PTSD and was under the care of psychiatrist and's counselors. However, patient had finished the course of therapy for now. Patient was encouraged to follow-up with a PTSD counselor /EMDR. She was given a list of places that she can see. She will be trying to call this people up will be calling her office back for any other help she needs. Patient is not currently on any therapy which is her choice since she has a lot of sensitivity issues. PHQ-2 Over the past 2 weeks, how often have you been bothered by any of the following problems? Little interest or pleasure in doing things: Not at all  Feeling down, depressed, or hopeless: Not at all  PHQ-2 Score: 0  PHQ-9 Over the past 2 weeks, how often have you been bothered by any of the following problems? PHQ-9 Total Score: 0    HEPATIC STEATOSIS--  Patient was started on Vitamin E-she was told that she needs to lose weight before December. Otherwise, the gastroenterologist and liver biopsy due to several liver cyst that was found and sent CT of the abdomen. \  PREVIOUS NOTE  Patient complains of some right upper quadrant pain. Patient has had this pain for a while. Patient is being followed up by [facility]. She was told to have hepatic steatosis and also some cyst on her liver. CT of the abdomen was repeated once it was told that were no changes. She continues to have some mild wrap right upper quadrant pain not associated with any food intake. Pain is mild and achy. No issues with nausea, vomiting, or diarrhea. Cloteal Franco Ultrasound was done. Patient had a liver lesion that was found on ultrasound. Patient was referred to the gastroenterologist.  CAT scan was also done. Radiologist noted that it was benign but she does have some hepatic steatosis. VITAMIN D  Patient has a known Vitamin D Deficiency. she had a course of supplementation for 12 weeks. she is due to get levels check. We continue to discuss ways to manage this condition.  We also discussed ways to improve the levels. Such as learning food groups that are high in Vitamin D. We also discuss that sun exposure is beneficial however, too much sun and sun damage can potentially result in skin cancer. Lab Results   Component Value Date/Time    VITD25 23.4 (L) 05/18/2021 09:11 AM      WEIGHT  Patient's BMI is Body mass index is 35.31 kg/m². kg/m2. BMI is decreasing. Patient understands that this condition increases the patient's risk for chronic conditions. Wt Readings from Last 3 Encounters:   10/29/21 180 lb 12.8 oz (82 kg)   06/14/21 180 lb (81.6 kg)   06/09/21 180 lb (81.6 kg)     Patient is due for colon cancer screening. Ashwini Wodo denies  nausea, vomiting, diarrhea, constipation, blood in the stool or abdominal pain. We discussed options, she would like to have: Cologuard. She is due for Mammogram.   Denies breast pain, lumps or nipple discharge. She declines breast exam today.     Patient Active Problem List   Diagnosis    Primary fibromyalgia syndrome    Antinuclear factor positive    Hepatic steatosis    Liver lesion    Gastritis    Elevated liver enzymes    Tobacco abuse    Anxiety    Type 2 diabetes mellitus without complication, without long-term current use of insulin (HCC)    Mixed hyperlipidemia    Gastro-esophageal reflux disease without esophagitis    Complete rotator cuff tear or rupture of right shoulder, not specified as traumatic    Nicotine dependence, cigarettes, uncomplicated    Other seasonal allergic rhinitis    Vitamin D deficiency    Pure hypercholesterolemia    Diabetes mellitus [facility] [address]    Family history of malignant neoplasm of breast    Type 2 diabetes mellitus with diabetic polyneuropathy ([facility] [address]    BMI 36.0-36.9,adult    Class 2 severe obesity due to excess calories with serious comorbidity and body mass index (BMI) of 36.0 to 36.9 in adult (HCC)    Moderate episode of recurrent major depressive disorder (HCC)    PTSD (post-traumatic stress disorder)    Lumbar radiculopathy    Bilateral shoulder region arthritis      Past Medical History:   Diagnosis Date    Abdominal pain     Antinuclear factor positive     Anxiety 1/16/2020    Arthritis     osteoarthritis    Autoimmune disease ([facility] [address]     unknown exactly, being worked up   [employer] 2 severe obesity due to excess calories with serious comorbidity and body mass index (BMI) of 36.0 to 36.9 in adult 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[address]     Sjogren's disease ([facility] [address]     Type 2 diabetes mellitus with diabetic polyneuropathy ([facility] [address] 5/24/2018      Past Surgical History:   Procedure Laterality Date    HYSTERECTOMY      partial hysterectomy    PAROTIDECTOMY Right     TONSILLECTOMY      UPPER GASTROINTESTINAL ENDOSCOPY  01/26/2016    chronic inactive gastritis    VARICOSE VEIN SURGERY Right      Family History   Problem Relation Age of Onset    Breast Cancer Mother         age 27    High Blood Pressure Mother     Diabetes Mother     High Blood Pressure Maternal Grandfather     Diabetes Maternal Grandfather      Current Outpatient Medications   Medication Sig Dispense Refill    DULoxetine (CYMBALTA) 30 MG extended release capsule Take 1 capsule by mouth daily 30 capsule 0    lidocaine 4 % external patch Place 1 patch onto the skin daily 30 patch 2    metFORMIN (GLUCOPHAGE-XR) 500 MG extended release tablet TAKE 2 TABLETS BY MOUTH 2 TIMES DAILY 120 tablet 2    Alcohol Swabs (B-D SINGLE USE SWABS REGULAR) PADS PATIENT CHECKS SUGAR 2-3 TIMES DAILY OR AS NEEDED 100 each 5    JARDIANCE 10 MG tablet TAKE 1 TABLET BY MOUTH DAILY 30 tablet 1    TRUE METRIX BLOOD GLUCOSE TEST strip CHECK BLOOD SUGAR 2 TIMES DAILY 100 each 5    ketotifen (ZADITOR) 0.025 % ophthalmic solution PLACE 1 DROP INTO BOTH EYES 2 TIMES DAILY 5 mL 0    silver sulfADIAZINE (SSD) 1 % cream APPLY TOPICALLY DAILY. 400 g 2    fexofenadine (ALLEGRA) 180 MG tablet TAKE 1 TABLET BY MOUTH DAILY 90 tablet 5    EPINEPHrine (EPIPEN) 0.3 MG/0.3ML SOAJ injection Use as directed 2 each 0    omeprazole (PRILOSEC) 20 MG delayed release capsule TAKE 1 CAPSULE BY MOUTH ONCE DAILY AS NEEDED 90 capsule 2    naproxen (NAPROSYN) 500 MG tablet Take 1 tablet by mouth 2 times daily (with meals) 180 tablet 1    Cholecalciferol (VITAMIN D3) 1.25 MG (86917 UT) CAPS Take by mouth      nystatin (MYCOSTATIN) 115103 UNIT/GM powder Apply topically 4 times daily. 1 Bottle 5    fluticasone (FLONASE) 50 MCG/ACT nasal spray INSTILL 1 SPRAY BY NASAL ROUTE 2 TIMES DAILY 16 g 2    Handicap Placard MISC by Does not apply route Expires December 2025 1 each 0    melatonin 3 MG TABS tablet Take 1 tablet by mouth nightly as needed (sleep) 30 tablet 3    cyclobenzaprine (FLEXERIL) 10 MG tablet Take 1 tablet by mouth 3 times daily as needed      Elastic Bandages & Supports (AIRCAST SPORT ANKLE BRACE/RGHT) MISC Wear all the time 1 each 0    TRUEPLUS LANCETS 28G MISC 1 (ONE) MISC MISC TWO TIMES DAILY      chlorhexidine (PERIDEX) 0.12 % solution       pravastatin (PRAVACHOL) 20 MG tablet Take 1 tablet by mouth daily 90 tablet 2     No current facility-administered medications for this visit. Review of Systems   Constitutional: Negative for activity change, chills and fever. HENT: Positive for hearing loss. Negative for congestion. Respiratory: Negative. Negative for cough and shortness of breath. Cardiovascular: Negative. Negative for chest pain and palpitations. Gastrointestinal: Negative for abdominal pain, constipation, diarrhea and nausea. mouth Yes Historical Provider, MD   nystatin (MYCOSTATIN) 559794 UNIT/GM powder Apply topically 4 times daily. Yes DAV Cuevas CNP   fluticasone (FLONASE) 50 MCG/ACT nasal spray INSTILL 1 SPRAY BY NASAL ROUTE 2 TIMES DAILY Yes DAV Cuevas CNP   Handicap Placard MISC by Does not apply route Expires December 2025 Yes DAV Cuevas CNP   melatonin 3 MG TABS tablet Take 1 tablet by mouth nightly as needed (sleep) Yes DAV Hamlin NP   cyclobenzaprine (FLEXERIL) 10 MG tablet Take 1 tablet by mouth 3 times daily as needed Yes Historical Provider, MD   Elastic Bandages & Supports (AIRCAST SPORT ANKLE BRACE/RGHT) MISC Wear all the time Yes DAV Cuevas CNP   TRUEPLUS LANCETS 28G MISC 1 (ONE) MISC MISC TWO TIMES DAILY Yes Historical Provider, MD   chlorhexidine (PERIDEX) 0.12 % solution  Yes Historical Provider, MD   pravastatin (PRAVACHOL) 20 MG tablet Take 1 tablet by mouth daily  DAV uCevas CNP   traZODone (DESYREL) 100 MG tablet trazodone 100 mg tablet  Historical Provider, MD      Social History     Tobacco Use    Smoking status: Current Every Day Smoker     Packs/day: 0.50     Years: 27.00     Pack years: 13.50     Types: Cigarettes    Smokeless tobacco: Never Used   Substance Use Topics    Alcohol use: No      Vitals:    10/29/21 1150   BP: 128/68   Pulse: 85   Temp: 96.7 °F (35.9 °C)   SpO2: 98%   Weight: 180 lb 12.8 oz (82 kg)   Height: 5' (1.524 m)     Estimated body mass index is 35.31 kg/m² as calculated from the following:    Height as of this encounter: 5' (1.524 m). Weight as of this encounter: 180 lb 12.8 oz (82 kg). Physical Exam  Vitals and nursing note reviewed. Constitutional:       Appearance: Normal appearance. She is obese. HENT:      Head: Normocephalic. Nose: Nose normal.   Cardiovascular:      Rate and Rhythm: Normal rate and regular rhythm.    Pulmonary:      Effort: Pulmonary effort is normal. Breath sounds: Normal breath sounds. Abdominal:      General: Abdomen is protuberant. Comments: obese   Musculoskeletal:      Right shoulder: Tenderness (traps) present. Left shoulder: Tenderness present. Skin:     General: Skin is warm and dry. Neurological:      Mental Status: She is alert and oriented to person, place, and time. Psychiatric:         Mood and Affect: Mood is anxious. Speech: Speech is not rapid and pressured. Thought Content: Thought content does not include suicidal ideation. ASSESSMENT/PLAN:  1. Type 2 diabetes mellitus with diabetic polyneuropathy, without long-term current use of insulin (HCC)  Stable  Continue therapy. We will continue to monitor Hemoglobin A1c   DISCUSSED and ADVISED TO:  Continue to check Glucose levels at home. Report increased and low levels as discussed. Decrease carbohydrates, sugary drinks, desserts in your diet. Exercise regularly, as tolerated. Try to lose weight. 2. Mixed hyperlipidemia  Stable  Continue therapy. Advised to decrease the consumption of red meats, fried foods, trans fats, sweets, sugary beverages. Advised to increase fish, vegetables, and fruits consumption. Advised to add fiber or OTC supplements in diet. Discussed weight loss which will result in improvement of lipids levels. Advised to increase daily physical activities and add regular exercises. 3. Lumbar radiculopathy  Stable  Continue current therapy. DISCUSSED AND ADVISED TO:  Use heat packs 15 to 20 mins every 2-3 hours. Do some back stretches as tolerated. Refer to hand out for instructions. Call for worsening, numbness, weakness.      - DULoxetine (CYMBALTA) 30 MG extended release capsule; Take 1 capsule by mouth daily  Dispense: 30 capsule; Refill: 0  - Amb External Referral To Physical Therapy  - lidocaine 4 % external patch; Place 1 patch onto the skin daily  Dispense: 30 patch; Refill: 2    4.  Primary fibromyalgia identified. This note was completed by using the assistance of a speech-recognition program. However, inadvertent computerized transcription errors may be present. Although every effort was made to ensure accuracy, no guarantees can be provided that every mistake has been identified and corrected by editing   An electronic signature was used to authenticate this note.   --DAV Matamoros - CNP on 10/29/2021 at 1:42 PM

## 2021-10-29 ENCOUNTER — OFFICE VISIT (OUTPATIENT)
Dept: FAMILY MEDICINE CLINIC | Age: 46
End: 2021-10-29
Payer: COMMERCIAL

## 2021-10-29 VITALS
HEART RATE: 85 BPM | HEIGHT: 60 IN | TEMPERATURE: 96.7 F | SYSTOLIC BLOOD PRESSURE: 128 MMHG | BODY MASS INDEX: 35.5 KG/M2 | DIASTOLIC BLOOD PRESSURE: 68 MMHG | OXYGEN SATURATION: 98 % | WEIGHT: 180.8 LBS

## 2021-10-29 DIAGNOSIS — K76.0 HEPATIC STEATOSIS: ICD-10-CM

## 2021-10-29 DIAGNOSIS — Z12.31 BREAST CANCER SCREENING BY MAMMOGRAM: ICD-10-CM

## 2021-10-29 DIAGNOSIS — F33.1 MODERATE EPISODE OF RECURRENT MAJOR DEPRESSIVE DISORDER (HCC): ICD-10-CM

## 2021-10-29 DIAGNOSIS — M54.16 LUMBAR RADICULOPATHY: ICD-10-CM

## 2021-10-29 DIAGNOSIS — Z12.11 COLON CANCER SCREENING: ICD-10-CM

## 2021-10-29 DIAGNOSIS — E78.2 MIXED HYPERLIPIDEMIA: ICD-10-CM

## 2021-10-29 DIAGNOSIS — M79.7 PRIMARY FIBROMYALGIA SYNDROME: ICD-10-CM

## 2021-10-29 DIAGNOSIS — E11.42 TYPE 2 DIABETES MELLITUS WITH DIABETIC POLYNEUROPATHY, WITHOUT LONG-TERM CURRENT USE OF INSULIN (HCC): Primary | ICD-10-CM

## 2021-10-29 DIAGNOSIS — M19.012 BILATERAL SHOULDER REGION ARTHRITIS: ICD-10-CM

## 2021-10-29 DIAGNOSIS — M19.011 BILATERAL SHOULDER REGION ARTHRITIS: ICD-10-CM

## 2021-10-29 PROCEDURE — G8417 CALC BMI ABV UP PARAM F/U: HCPCS | Performed by: FAMILY MEDICINE

## 2021-10-29 PROCEDURE — G8427 DOCREV CUR MEDS BY ELIG CLIN: HCPCS | Performed by: FAMILY MEDICINE

## 2021-10-29 PROCEDURE — 99214 OFFICE O/P EST MOD 30 MIN: CPT | Performed by: FAMILY MEDICINE

## 2021-10-29 PROCEDURE — 4004F PT TOBACCO SCREEN RCVD TLK: CPT | Performed by: FAMILY MEDICINE

## 2021-10-29 PROCEDURE — 3052F HG A1C>EQUAL 8.0%<EQUAL 9.0%: CPT | Performed by: FAMILY MEDICINE

## 2021-10-29 PROCEDURE — 2022F DILAT RTA XM EVC RTNOPTHY: CPT | Performed by: FAMILY MEDICINE

## 2021-10-29 PROCEDURE — G8484 FLU IMMUNIZE NO ADMIN: HCPCS | Performed by: FAMILY MEDICINE

## 2021-10-29 RX ORDER — DULOXETIN HYDROCHLORIDE 30 MG/1
30 CAPSULE, DELAYED RELEASE ORAL DAILY
Qty: 30 CAPSULE | Refills: 0 | Status: SHIPPED | OUTPATIENT
Start: 2021-10-29 | End: 2021-11-28

## 2021-10-29 RX ORDER — LIDOCAINE 4 G/G
1 PATCH TOPICAL DAILY
Qty: 30 PATCH | Refills: 2 | Status: SHIPPED | OUTPATIENT
Start: 2021-10-29 | End: 2021-11-28

## 2021-10-29 ASSESSMENT — PATIENT HEALTH QUESTIONNAIRE - PHQ9
1. LITTLE INTEREST OR PLEASURE IN DOING THINGS: 0
2. FEELING DOWN, DEPRESSED OR HOPELESS: 0
SUM OF ALL RESPONSES TO PHQ QUESTIONS 1-9: 0
SUM OF ALL RESPONSES TO PHQ QUESTIONS 1-9: 0
SUM OF ALL RESPONSES TO PHQ9 QUESTIONS 1 & 2: 0
SUM OF ALL RESPONSES TO PHQ QUESTIONS 1-9: 0

## 2021-10-29 ASSESSMENT — ENCOUNTER SYMPTOMS: BACK PAIN: 1

## 2021-10-29 NOTE — PROGRESS NOTES
Visit Information    Have you changed or started any medications since your last visit including any over-the-counter medicines, vitamins, or herbal medicines? no   Have you stopped taking any of your medications? Is so, why? -  no  Are you having any side effects from any of your medications? - no    Have you seen any other physician or provider since your last visit?  no   Have you had any other diagnostic tests since your last visit?  no   Have you been seen in the emergency room and/or had an admission in a hospital since we last saw you?  no   Have you had your routine dental cleaning in the past 6 months?  no     Do you have an active MyChart account? If no, what is the barrier?   Yes    Patient Care Team:  DAV Davila CNP as PCP - General (Family Medicine)  DAV Cuevas CNP [facility] PCP [facility]    Medical History Review  Past Medical, Family, and Social History reviewed and does contribute to the patient presenting condition    Health Maintenance   Topic Date Due    Pneumococcal 0-64 years Vaccine (1 of 2 - PPSV23) Never done    Diabetic foot exam  Never done    Diabetic retinal exam  Never done    COVID-19 Vaccine (1) Never done    Hepatitis B vaccine (1 of 3 - Risk 3-dose series) Never done    DTaP/Tdap/Td vaccine (1 - Tdap) Never done    Breast cancer screen  Never done    Colon cancer screen colonoscopy  Never done    Flu vaccine (1) Never done    Diabetic microalbuminuria test  05/18/2022    Lipid screen  05/18/2022    A1C test (Diabetic or Prediabetic)  06/14/2022    Hepatitis C screen  Completed    HIV screen  Completed    Hepatitis A vaccine  Aged Out    Hib vaccine  Aged Out    Meningococcal (ACWY) vaccine  Aged Out

## 2021-10-29 NOTE — PATIENT INSTRUCTIONS
Patient Education        Learning About Carbohydrate (Carb) Counting and Eating Out When You Have Diabetes  Why plan your meals? Meal planning can be a key part of managing diabetes. Planning meals and snacks with the right balance of carbohydrate, protein, and fat can help you keep your blood sugar at the target level you set with your doctor. You don't have to eat special foods. You can eat what your family eats, including sweets once in a while. But you do have to pay attention to how often you eat and how much you eat of certain foods. You may want to work with a dietitian or a certified diabetes educator. He or she can give you tips and meal ideas and can answer your questions about meal planning. This health professional can also help you reach a healthy weight if that is one of your goals. What should you know about eating carbs? Managing the amount of carbohydrate (carbs) you eat is an important part of healthy meals when you have diabetes. Carbohydrate is found in many foods. · Learn which foods have carbs. And learn the amounts of carbs in different foods. ? Bread, cereal, pasta, and rice have about 15 grams of carbs in a serving. A serving is 1 slice of bread (1 ounce), ½ cup of cooked cereal, or 1/3 cup of cooked pasta or rice. ? Fruits have 15 grams of carbs in a serving. A serving is 1 small fresh fruit, such as an apple or orange; ½ of a banana; ½ cup of cooked or canned fruit; ½ cup of fruit juice; 1 cup of melon or raspberries; or 2 tablespoons of dried fruit. ? Milk and no-sugar-added yogurt have 15 grams of carbs in a serving. A serving is 1 cup of milk or 2/3 cup of no-sugar-added yogurt. ? Starchy vegetables have 15 grams of carbs in a serving. A serving is ½ cup of mashed potatoes or sweet potato; 1 cup winter squash; ½ of a small baked potato; ½ cup of cooked beans; or ½ cup cooked corn or green peas.   · Learn how much carbs to eat each day and at each meal. A dietitian or CDE can teach you how to keep track of the amount of carbs you eat. This is called carbohydrate counting. · If you are not sure how to count carbohydrate grams, use the Plate Method to plan meals. It is a good, quick way to make sure that you have a balanced meal. It also helps you spread carbs throughout the day. ? Divide your plate by types of foods. Put non-starchy vegetables on half the plate, meat or other protein food on one-quarter of the plate, and a grain or starchy vegetable in the final quarter of the plate. To this you can add a small piece of fruit and 1 cup of milk or yogurt, depending on how many carbs you are supposed to eat at a meal.  · Try to eat about the same amount of carbs at each meal. Do not \"save up\" your daily allowance of carbs to eat at one meal.  · Proteins have very little or no carbs per serving. Examples of proteins are beef, chicken, turkey, fish, eggs, tofu, cheese, cottage cheese, and peanut butter. A serving size of meat is 3 ounces, which is about the size of a deck of cards. Examples of meat substitute serving sizes (equal to 1 ounce of meat) are 1/4 cup of cottage cheese, 1 egg, 1 tablespoon of peanut butter, and ½ cup of tofu. How can you eat out and still eat healthy? · Learn to estimate the serving sizes of foods that have carbohydrate. If you measure food at home, it will be easier to estimate the amount in a serving of restaurant food. · If the meal you order has too much carbohydrate (such as potatoes, corn, or baked beans), ask to have a low-carbohydrate food instead. Ask for a salad or green vegetables. · If you use insulin, check your blood sugar before and after eating out to help you plan how much to eat in the future. · If you eat more carbohydrate at a meal than you had planned, take a walk or do other exercise. This will help lower your blood sugar. What are some tips for eating healthy? · Limit saturated fat, such as the fat from meat and dairy products.  This is a healthy choice because people who have diabetes are at higher risk of heart disease. So choose lean cuts of meat and nonfat or low-fat dairy products. Use olive or canola oil instead of butter or shortening when cooking. · Don't skip meals. Your blood sugar may drop too low if you skip meals and take insulin or certain medicines for diabetes. · Check with your doctor before you drink alcohol. Alcohol can cause your blood sugar to drop too low. Alcohol can also cause a bad reaction if you take certain diabetes medicines. Follow-up care is a key part of your treatment and safety. Be sure to make and go to all appointments, and call your doctor if you are having problems. It's also a good idea to know your test results and keep a list of the medicines you take. Where can you learn more? Go to https://Vetterywarreneb.untapt. org and sign in to your Player X account. Enter F785 in the [employer] box to learn more about \"Learning About Carbohydrate (Carb) Counting and Eating Out When You Have Diabetes. \"     If you do not have an account, please click on the \"Sign Up Now\" link. Current as of: December 17, 2020               Content Version: 13.0  © 1634-5783 [employer]. Care instructions adapted under license by [facility] ([facility]). If you have questions about a medical condition or this instruction, always ask your healthcare professional. [place] [address] any warranty or liability for your use of this information.

## 2021-11-26 DIAGNOSIS — M54.16 LUMBAR RADICULOPATHY: ICD-10-CM

## 2021-11-26 DIAGNOSIS — M79.7 PRIMARY FIBROMYALGIA SYNDROME: ICD-10-CM

## 2021-11-28 RX ORDER — DULOXETIN HYDROCHLORIDE 30 MG/1
30 CAPSULE, DELAYED RELEASE ORAL DAILY
Qty: 30 CAPSULE | Refills: 0 | Status: SHIPPED | OUTPATIENT
Start: 2021-11-28 | End: 2021-12-28

## 2021-12-07 DIAGNOSIS — M54.16 LUMBAR RADICULOPATHY: ICD-10-CM

## 2021-12-07 DIAGNOSIS — M79.7 PRIMARY FIBROMYALGIA SYNDROME: ICD-10-CM

## 2021-12-07 RX ORDER — NAPROXEN 500 MG/1
500 TABLET ORAL 2 TIMES DAILY WITH MEALS
Qty: 180 TABLET | Refills: 1 | Status: SHIPPED | OUTPATIENT
Start: 2021-12-07 | End: 2022-01-17 | Stop reason: SDUPTHER

## 2021-12-07 NOTE — TELEPHONE ENCOUNTER
Please Approve or Refuse.   Send to Pharmacy per Pt's Request:      Next Visit Date:  2/1/2022   Last Visit Date: 10/29/2021    Hemoglobin A1C (%)   Date Value   06/14/2021 8.7   03/18/2021 9.2 (H)   06/11/2018 6.9             ( goal A1C is < 7)   BP Readings from Last 3 Encounters:   10/29/21 128/68   06/14/21 124/78   03/15/21 110/80          (goal 120/80)  BUN   Date Value Ref Range Status   05/18/2021 9 6 - 20 mg/dL Final     CREATININE   Date Value Ref Range Status   05/18/2021 0.88 0.50 - 0.90 mg/dL Final     Potassium   Date Value Ref Range Status   05/18/2021 4.4 3.7 - 5.3 mmol/L Final

## 2021-12-20 DIAGNOSIS — E11.42 TYPE 2 DIABETES MELLITUS WITH DIABETIC POLYNEUROPATHY, WITHOUT LONG-TERM CURRENT USE OF INSULIN (HCC): ICD-10-CM

## 2021-12-20 RX ORDER — EMPAGLIFLOZIN 10 MG/1
1 TABLET, FILM COATED ORAL DAILY
Qty: 30 TABLET | Refills: 0 | Status: SHIPPED | OUTPATIENT
Start: 2021-12-20 | End: 2022-01-18

## 2022-01-17 ENCOUNTER — E-VISIT (OUTPATIENT)
Dept: FAMILY MEDICINE CLINIC | Age: 47
End: 2022-01-17
Payer: COMMERCIAL

## 2022-01-17 ENCOUNTER — TELEPHONE (OUTPATIENT)
Dept: FAMILY MEDICINE CLINIC | Age: 47
End: 2022-01-17

## 2022-01-17 DIAGNOSIS — J30.2 OTHER SEASONAL ALLERGIC RHINITIS: ICD-10-CM

## 2022-01-17 DIAGNOSIS — Z20.822 SUSPECTED 2019 NOVEL CORONAVIRUS INFECTION: ICD-10-CM

## 2022-01-17 DIAGNOSIS — J01.40 ACUTE NON-RECURRENT PANSINUSITIS: Primary | ICD-10-CM

## 2022-01-17 PROCEDURE — 87804 INFLUENZA ASSAY W/OPTIC: CPT | Performed by: FAMILY MEDICINE

## 2022-01-17 PROCEDURE — 99422 OL DIG E/M SVC 11-20 MIN: CPT | Performed by: FAMILY MEDICINE

## 2022-01-17 RX ORDER — FLUTICASONE PROPIONATE 50 MCG
SPRAY, SUSPENSION (ML) NASAL
Qty: 16 G | Refills: 2 | Status: SHIPPED | OUTPATIENT
Start: 2022-01-17 | End: 2022-04-08

## 2022-01-17 RX ORDER — DOXYCYCLINE HYCLATE 100 MG
100 TABLET ORAL 2 TIMES DAILY
Qty: 10 TABLET | Refills: 0 | Status: SHIPPED | OUTPATIENT
Start: 2022-01-17 | End: 2022-01-22

## 2022-01-17 RX ORDER — METHYLPREDNISOLONE 4 MG/1
TABLET ORAL
Qty: 1 KIT | Refills: 0 | Status: SHIPPED | OUTPATIENT
Start: 2022-01-17 | End: 2022-02-01

## 2022-01-17 RX ORDER — FEXOFENADINE HCL 180 MG/1
180 TABLET ORAL DAILY
Qty: 90 TABLET | Refills: 5 | Status: SHIPPED | OUTPATIENT
Start: 2022-01-17 | End: 2022-06-01 | Stop reason: SDUPTHER

## 2022-01-17 RX ORDER — NAPROXEN 500 MG/1
500 TABLET ORAL 2 TIMES DAILY WITH MEALS
Qty: 180 TABLET | Refills: 1 | Status: SHIPPED | OUTPATIENT
Start: 2022-01-17 | End: 2022-09-26 | Stop reason: SDUPTHER

## 2022-01-17 ASSESSMENT — LIFESTYLE VARIABLES
SMOKING_STATUS: YES
SMOKING_YEARS: 30
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
## 2022-01-17 NOTE — TELEPHONE ENCOUNTER
Patient called wanting to know if she can take her medication naproxen that was prescribed today, although she took a motrin OTC medication this morning at 9 am. Please advise.

## 2022-01-20 DIAGNOSIS — E11.42 TYPE 2 DIABETES MELLITUS WITH DIABETIC POLYNEUROPATHY, WITHOUT LONG-TERM CURRENT USE OF INSULIN (HCC): ICD-10-CM

## 2022-01-21 RX ORDER — METFORMIN HYDROCHLORIDE 500 MG/1
TABLET, EXTENDED RELEASE ORAL
Qty: 120 TABLET | Refills: 2 | Status: SHIPPED | OUTPATIENT
Start: 2022-01-21 | End: 2022-03-15

## 2022-01-21 NOTE — TELEPHONE ENCOUNTER
Please Approve or Refuse.   Send to Pharmacy per Pt's Request:      Next Visit Date:  2/1/2022   Last Visit Date: 1/17/2022    Hemoglobin A1C (%)   Date Value   06/14/2021 8.7   03/18/2021 9.2 (H)   06/11/2018 6.9             ( goal A1C is < 7)   BP Readings from Last 3 Encounters:   10/29/21 128/68   06/14/21 124/78   03/15/21 110/80          (goal 120/80)  BUN   Date Value Ref Range Status   05/18/2021 9 6 - 20 mg/dL Final     CREATININE   Date Value Ref Range Status   05/18/2021 0.88 0.50 - 0.90 mg/dL Final     Potassium   Date Value Ref Range Status   05/18/2021 4.4 3.7 - 5.3 mmol/L Final

## 2022-01-25 DIAGNOSIS — E78.2 MIXED HYPERLIPIDEMIA: ICD-10-CM

## 2022-01-25 DIAGNOSIS — K21.9 GASTRO-ESOPHAGEAL REFLUX DISEASE WITHOUT ESOPHAGITIS: ICD-10-CM

## 2022-01-25 RX ORDER — PRAVASTATIN SODIUM 20 MG
20 TABLET ORAL DAILY
Qty: 90 TABLET | Refills: 2 | Status: SHIPPED | OUTPATIENT
Start: 2022-01-25 | End: 2022-10-07

## 2022-01-25 RX ORDER — OMEPRAZOLE 20 MG/1
CAPSULE, DELAYED RELEASE ORAL
Qty: 90 CAPSULE | Refills: 2 | Status: SHIPPED | OUTPATIENT
Start: 2022-01-25

## 2022-01-30 ASSESSMENT — ENCOUNTER SYMPTOMS
CONSTIPATION: 0
RESPIRATORY NEGATIVE: 1
COUGH: 0
NAUSEA: 0
SHORTNESS OF BREATH: 0
ABDOMINAL PAIN: 0
DIARRHEA: 0
BACK PAIN: 1

## 2022-01-30 NOTE — PROGRESS NOTES
a1c     [facility]  [facility]  [address]  [address] [place] [id number]  Dept: [phone]     2022   Chief Complaint   Patient presents with    Congestion     STILL HAVING CONGESTION/ DOING A LITTLE BETTER / PRESSURE IN FACE IS MUCH BETTER JUST SORE     Other     SWELLING IN FACE GONE/ THIS FRIDAY RAN FEVER .7 TILL SATURDAY NIGHT/ NOW NORMAL /YESTERDAY FEELS MUCH BETTER     HPI  Silvano Smyth (:  1975) is a 40 y.o. female is an established patient. Patient has a history of diabetes, hyperlipidemia, fibromyalgia, lumbar radiculopathy, by psychosis, depression, shoulder arthritis,     COVID EXPOSURE  Silvano Smyth is a 40 y.o. female patient reported some worsening sinus congestion, postnasal drip, runny nose, facial pressure, continues to have fever, also reports ageusia. Despite using a lot of home remedies. Patient was given a doxycycline and Depo-Medrol a couple weeks ago for the same symptoms but did not take them since she does have a multiple drug allergies and she was fearful of having an allergic reaction. But since patient continues to have this fever she is encouraged to start taking this medication. She is also encouraged to get screened for COVID-19 virus since she does have a child that was also exposed at school. DIABETES MELLITUS--lost weight  Patient has a  stable Diabetes Mellitus. Patient states that she changed diet- Sugar have been fine. Hypoglycemia X 3. Lab Results   Component Value Date    LABA1C 8.7 2021   . Current therapy includes metformin, Jardiance--was recently approved patient's dose was increased but unable to tolerate. Dose decreased to 500 mg.  Given some Jardiance to take just recently had this medication approved by insurance. Patient is responding poorly with this therapy. Losing weight patient reports home glucose monitoring as Stable readings. 140- 150.    Patient denieshypoglycemia episodes such as{symptoms. Patient denies blurred vision. The patient has not seen an ophthalmologist with in one last year. The patient is  on ACE inhibitors. The patient has not a foot examination. The patient has not seen an podiatrist with in last year. Lab Results   Component Value Date/Time    LABA1C 8.7 06/14/2021 08:33 AM    LABA1C 9.2 (H) 03/18/2021 08:53 AM    LABA1C 6.9 06/11/2018 12:00 AM     HYPERLIPIDEMIA  Ruth Lange is currently on pravastatin (Pravachol). She has been on this medicine from patient denies adverse reaction to this medication. Compliance with treatment thus far has been good. The patient is not known to have coexisting coronary artery disease. The 10-year CVD risk score (Amaino, et al., 2008) is: 16.5%    Values used to calculate the score:      Age: 40 years      Sex: Female      Diabetic: Yes      Tobacco smoker: Yes      Systolic Blood Pressure: 068 mmHg      Is BP treated: No      HDL Cholesterol: 38 mg/dL      Total Cholesterol: 224 mg/dL  Lab Results   Component Value Date/Time    CHOLFAST 224 (H) 05/18/2021 09:11 AM    CHOL 193 10/08/2019 11:36 AM    HDL 38 (L) 05/18/2021 09:11 AM    LDLCHOLESTEROL 153 (H) 05/18/2021 09:11 AM    TRIGLYCFAST 163 (H) 05/18/2021 09:11 AM    CHOLHDLRATIO 5.9 (H) 05/18/2021 09:11 AM    TRIG 165 (H) 10/08/2019 11:36 AM    VLDL NOT REPORTED (H) 05/18/2021 09:11 AM     FIBROMYALGIA/ LOW BACK PAIN  Patient with a known general body aches due to fbromyalgia. Patient reported some worsening of body aches. Patient is not able to take any anti-inflammatory due to some drug insensitivity and GERD. Also. Patient have tried some gabapentin which did not work. Current therapy Lyrica-- stopped--Cymbalta--stop Cymbalta too. Due to weight gain  Since stopping the medication patient now has abdominal pain especially since she started back to working. 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     ADJUSTMENT DISORDER/PTSD/INSOMNIA  Patient appears to have more anxiety related to finances, work, health and relationships. We did not discuss her mental health conditions today depression screening. Patient also has a known history of insomnia does fairly well with melatonin requesting refill. anxiety attack-- stopped Cymbalta  PREVIOUS NOTES  Patient had some significant history of PTSD and was under the care of psychiatrist and's counselors. However, patient had finished the course of therapy for now. Patient was encouraged to follow-up with a PTSD counselor /EMDR. She was given a list of places that she can see. She will be trying to call this people up will be calling her office back for any other help she needs. Patient is not currently on any therapy which is her choice since she has a lot of sensitivity issues. PHQ-2 Over the past 2 weeks, how often have you been bothered by any of the following problems? Little interest or pleasure in doing things: Not at all  Feeling down, depressed, or hopeless: Not at all  PHQ-2 Score: 0  PHQ-9 Over the past 2 weeks, how often have you been bothered by any of the following problems? PHQ-9 Total Score: 0  PHQ-9 Total Score: 0    WEIGHT  Patient's BMI is Body mass index is 32.81 kg/m². kg/m2. BMI is decreasing. Patient understands that this condition increases the patient's risk for chronic conditions. Wt Readings from Last 3 Encounters:   02/01/22 168 lb (76.2 kg)   10/29/21 180 lb 12.8 oz (82 kg)   06/14/21 180 lb (81.6 kg)     Patient is due for colon cancer screening. Isaias Escobar denies  nausea, vomiting, diarrhea, constipation, blood in the stool or abdominal pain. We discussed options, she would like to have: Cologuard.     Patient Active Problem List   Diagnosis    Primary fibromyalgia syndrome    Antinuclear factor positive    Hepatic steatosis    Liver lesion    Gastritis    Elevated liver enzymes    Tobacco abuse    Anxiety    Type 2 diabetes mellitus without complication, without long-term current use of insulin (HCC)    Mixed hyperlipidemia    Gastro-esophageal reflux disease without esophagitis    Complete rotator cuff tear or rupture of right shoulder, not specified as traumatic    Nicotine dependence, cigarettes, uncomplicated    Other seasonal allergic rhinitis    Vitamin D deficiency    Pure hypercholesterolemia    Diabetes mellitus ([address]    Family history of malignant neoplasm of breast    Type 2 diabetes mellitus with diabetic polyneuropathy ([facility]    BMI 36.0-36.9,adult    Class 2 severe obesity due to excess calories with serious comorbidity and body mass index (BMI) of 36.0 to 36.9 in adult (HCC)    Moderate episode of recurrent major depressive disorder (HCC)    PTSD (post-traumatic stress disorder)    Lumbar radiculopathy    Bilateral shoulder region arthritis      Past Medical History:   Diagnosis Date    Abdominal pain     Antinuclear factor positive     Anxiety 1/16/2020    Arthritis     osteoarthritis    Autoimmune disease ([facility]     unknown exactly, being worked up   [employer] severe obesity due to excess calories with serious comorbidity and body mass index (BMI) of 36.0 to 36.9 in adult ([facility] 7/27/2020    Depression     Diabetes mellitus (HCC)     Edema     Environmental allergies     Epigastric pain     Fibromyalgia     Gastritis     Gastro-esophageal reflux disease without esophagitis 10/6/2017    Hepatic steatosis     Liver lesion     Lumbago     Other seasonal allergic rhinitis 10/6/2017    Plantar fasciitis     Primary fibromyalgia syndrome     PTSD (post-traumatic stress disorder)     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[facility]     Sjogren's disease ([facility]     Type 2 diabetes mellitus with diabetic polyneuropathy ([facility] 5/24/2018      Past Surgical History:   Procedure Laterality Date    HYSTERECTOMY      partial hysterectomy    PAROTIDECTOMY Right     TONSILLECTOMY      UPPER GASTROINTESTINAL ENDOSCOPY  01/26/2016 chronic inactive gastritis    VARICOSE VEIN SURGERY Right      Family History   Problem Relation Age of Onset    Breast Cancer Mother         age 27    High Blood Pressure Mother     Diabetes Mother     High Blood Pressure Maternal Grandfather     Diabetes Maternal Grandfather      Current Outpatient Medications   Medication Sig Dispense Refill    melatonin 3 MG TABS tablet Take 1 tablet by mouth nightly as needed (sleep) 30 tablet 3    chlorhexidine (PERIDEX) 0.12 % solution Take 15 mLs by mouth 2 times daily 1 each 2    pravastatin (PRAVACHOL) 20 MG tablet TAKE 1 TABLET BY MOUTH DAILY 90 tablet 2    omeprazole (PRILOSEC) 20 MG delayed release capsule TAKE 1 CAPSULE BY MOUTH ONCE DAILY AS NEEDED 90 capsule 2    metFORMIN (GLUCOPHAGE-XR) 500 MG extended release tablet TAKE 2 TABLETS BY MOUTH 2 TIMES DAILY 120 tablet 2    JARDIANCE 10 MG tablet TAKE 1 TABLET BY MOUTH DAILY 90 tablet 0    fluticasone (FLONASE) 50 MCG/ACT nasal spray INSTILL 1 SPRAY BY NASAL ROUTE 2 TIMES DAILY 16 g 2    fexofenadine (ALLEGRA) 180 MG tablet Take 1 tablet by mouth daily 90 tablet 5    naproxen (NAPROSYN) 500 MG tablet Take 1 tablet by mouth 2 times daily (with meals) 180 tablet 1    Alcohol Swabs (B-D SINGLE USE SWABS REGULAR) PADS PATIENT CHECKS SUGAR 2-3 TIMES DAILY OR AS NEEDED 100 each 5    TRUE METRIX BLOOD GLUCOSE TEST strip CHECK BLOOD SUGAR 2 TIMES DAILY 100 each 5    ketotifen (ZADITOR) 0.025 % ophthalmic solution PLACE 1 DROP INTO BOTH EYES 2 TIMES DAILY 5 mL 0    silver sulfADIAZINE (SSD) 1 % cream APPLY TOPICALLY DAILY. 400 g 2    Cholecalciferol (VITAMIN D3) 1.25 MG (92007 UT) CAPS Take by mouth      nystatin (MYCOSTATIN) 997784 UNIT/GM powder Apply topically 4 times daily.  1 Bottle 5    Handicap Placard MISC by Does not apply route Expires December 2025 1 each 0    cyclobenzaprine (FLEXERIL) 10 MG tablet Take 1 tablet by mouth 3 times daily as needed      TRUEPLUS LANCETS 28G MISC 1 (ONE) MISC MISC TWO TIMES DAILY      EPINEPHrine (EPIPEN) 0.3 MG/0.3ML SOAJ injection Use as directed (Patient not taking: Reported on 1/31/2022) 2 each 0    Elastic Bandages & Supports (AIRCAST SPORT ANKLE BRACE/RGHT) MISC Wear all the time (Patient not taking: Reported on 1/31/2022) 1 each 0     No current facility-administered medications for this visit. Review of Systems   Constitutional: Positive for activity change and fever. Negative for chills and fatigue. HENT: Positive for congestion, postnasal drip, sinus pressure, sinus pain and sore throat. Respiratory: Negative. Negative for cough and shortness of breath. Cardiovascular: Negative. Negative for chest pain and palpitations. Gastrointestinal: Negative for abdominal pain, constipation, diarrhea and nausea. Endocrine: Negative for cold intolerance. Musculoskeletal: Positive for back pain, joint swelling and myalgias. Negative for arthralgias and gait problem. General myalgias   Skin: Negative for rash. Allergic/Immunologic: Positive for environmental allergies. Neurological: Positive for numbness. Negative for headaches. Psychiatric/Behavioral: Positive for sleep disturbance. Negative for suicidal ideas. The patient is nervous/anxious. Prior to Visit Medications    Medication Sig Taking?  Authorizing Provider   melatonin 3 MG TABS tablet Take 1 tablet by mouth nightly as needed (sleep) Yes DAV Cuevas CNP   chlorhexidine (PERIDEX) 0.12 % solution Take 15 mLs by mouth 2 times daily Yes DAV Cuevas CNP   pravastatin (PRAVACHOL) 20 MG tablet TAKE 1 TABLET BY MOUTH DAILY Yes DAV Cuevas CNP   omeprazole (PRILOSEC) 20 MG delayed release capsule TAKE 1 CAPSULE BY MOUTH ONCE DAILY AS NEEDED Yes DAV Cuevas CNP   metFORMIN (GLUCOPHAGE-XR) 500 MG extended release tablet TAKE 2 TABLETS BY MOUTH 2 TIMES DAILY Yes DAV Cuevas CNP   JARDIANCE 10 MG tablet TAKE 1 TABLET BY MOUTH DAILY Yes DAV Cuevas CNP   fluticasone (FLONASE) 50 MCG/ACT nasal spray INSTILL 1 SPRAY BY NASAL ROUTE 2 TIMES DAILY Yes DAV Cuevas CNP   fexofenadine (ALLEGRA) 180 MG tablet Take 1 tablet by mouth daily Yes DAV Cuevas CNP   naproxen (NAPROSYN) 500 MG tablet Take 1 tablet by mouth 2 times daily (with meals) Yes DAV Cuevas CNP   Alcohol Swabs (B-D SINGLE USE SWABS REGULAR) PADS PATIENT CHECKS SUGAR 2-3 TIMES DAILY OR AS NEEDED Yes DAV Cuevas CNP   TRUE METRIX BLOOD GLUCOSE TEST strip CHECK BLOOD SUGAR 2 TIMES DAILY Yes DAV Cuevas CNP   ketotifen (ZADITOR) 0.025 % ophthalmic solution PLACE 1 DROP INTO BOTH EYES 2 TIMES DAILY Yes DAV Cuevas CNP   silver sulfADIAZINE (SSD) 1 % cream APPLY TOPICALLY DAILY. Yes DAV Cuevas CNP   Cholecalciferol (VITAMIN D3) 1.25 MG (23608 UT) CAPS Take by mouth Yes Historical Provider, MD   nystatin (MYCOSTATIN) 561503 UNIT/GM powder Apply topically 4 times daily.  Yes DAV Cuevas CNP   Handicap Placard MISC by Does not apply route Expires December 2025 Yes DAV Cuevas CNP   cyclobenzaprine (FLEXERIL) 10 MG tablet Take 1 tablet by mouth 3 times daily as needed Yes Historical Provider, MD   TRUEPLUS LANCETS 28G MISC 1 (ONE) MISC MISC TWO TIMES DAILY Yes Historical Provider, MD   EPINEPHrine (EPIPEN) 0.3 MG/0.3ML SOAJ injection Use as directed  Patient not taking: Reported on 1/31/2022  DAV Cuevas CNP   Elastic Bandages & Supports (AIRCAST SPORT ANKLE BRACE/RGHT) MISC Wear all the time  Patient not taking: Reported on 1/31/2022  DAV Cuevas CNP   traZODone (DESYREL) 100 MG tablet trazodone 100 mg tablet  Historical Provider, MD      Social History     Tobacco Use    Smoking status: Current Every Day Smoker     Packs/day: 0.50     Years: 27.00     Pack years: 13.50     Types: Cigarettes    Smokeless tobacco: Never Used   Substance Use Topics    Alcohol use: No      Vitals:    02/01/22 0930   Weight: 168 lb (76.2 kg)   Height: 5' (1.524 m)     Estimated body mass index is 32.81 kg/m² as calculated from the following:    Height as of this encounter: 5' (1.524 m). Weight as of this encounter: 168 lb (76.2 kg). Physical Exam  HENT:      Head: Normocephalic. Nose:      Right Sinus: Maxillary sinus tenderness and frontal sinus tenderness present. Left Sinus: Maxillary sinus tenderness and frontal sinus tenderness present. Pulmonary:      Effort: Pulmonary effort is normal.   Neurological:      Mental Status: She is alert and oriented to person, place, and time. Psychiatric:         Mood and Affect: Mood is anxious. Thought Content: Thought content does not include suicidal ideation. ASSESSMENT/PLAN:  1. Suspected 2019 novel coronavirus infection  Worsening  Ongoing  Testing site: [phone]  Continue COVID 19 symptoms monitoring  Continue to safe distance from family and friends. DISCUSSED and ADVISED TO:  Stay away from people with suspected COVID 19. Wear a mask. Stay away from big crowds. Wash hands frequently. Use alcohol based hand cleansers frequently. Try not to touch face. Try to improve your immune system by eating healthy food, getting enough sleep and trying to avoid excessive stress. Stay in touch with the current news. Report for fever/chills,body aches, shortness of breath, malaise, loss of taste/smell, worsening cough. - chlorhexidine (PERIDEX) 0.12 % solution; Take 15 mLs by mouth 2 times daily  Dispense: 1 each; Refill: 2  - COVID-19; Future  - POCT Influenza A/B    2. Type 2 diabetes mellitus with diabetic polyneuropathy, without long-term current use of insulin (HCC)  Failure to Improve  Continue therapy. We will continue to monitor Hemoglobin A1c   DISCUSSED and ADVISED TO:  Continue to check Glucose levels at home.   Report increased and low levels as discussed. Decrease carbohydrates, sugary drinks, desserts in your diet. Exercise regularly, as tolerated. Try to lose weight. 3. Mixed hyperlipidemia  Failure to Improve  Continue therapy. Advised to decrease the consumption of red meats, fried foods, trans fats, sweets, sugary beverages. Advised to increase fish, vegetables, and fruits consumption. Advised to add fiber or OTC supplements in diet. Discussed weight loss which will result in improvement of lipids levels. Advised to increase daily physical activities and add regular exercises. 4. Lumbar radiculopathy  Failure to Improve  Continue current therapy. DISCUSSED AND ADVISED TO:  Use heat packs 15 to 20 mins every 2-3 hours. Do some back stretches as tolerated. Refer to hand out for instructions. Call for worsening, numbness, weakness. 5. Primary fibromyalgia syndrome  Failure to Improve  DISCUSSED AND ADVISED TO:  Exercise often. Get a good night's sleep. Make healthy changes to diet. Quit smoking   Try to reduce stress  Carefully take medications as discussed  Report for worsening symptoms            6. Psychophysiological insomnia  Failure to Improve  Continue current routine or therapy. DISCUSSED AND ADVISED TO:  Cut down intake of drinks with caffeine, such as coffee or black tea, for 8 hours before bed. Cut down on smoking or use other types of tobacco near bedtime. Cut down on Nicotine and alcohol   Stop eating a big meal close to bedtime. Stop drinking a lot of  fluids close to bedtime.      - melatonin 3 MG TABS tablet; Take 1 tablet by mouth nightly as needed (sleep)  Dispense: 30 tablet; Refill: 3    7. Multiple allergies  Failure to Improve  Continue to monitor    - Deepika Card MD, Allergy & Immunology, [facility]    8. Colon cancer screening  Recommended    - Cologuard;  Future        Controlled Substance Monitoring:  Acute and Chronic Pain Monitoring:   RX Monitoring 3/15/2021   Periodic Controlled Substance Monitoring No signs of potential drug abuse or diversion identified. This note was completed by using the assistance of a speech-recognition program. However, inadvertent computerized transcription errors may be present. Although every effort was made to ensure accuracy, no guarantees can be provided that every mistake has been identified and corrected by editing   An electronic signature was used to authenticate this note.   --Jens Linton, DAV - CNP on 2/1/2022 at 9:56 PM

## 2022-01-31 ASSESSMENT — PATIENT HEALTH QUESTIONNAIRE - PHQ9
SUM OF ALL RESPONSES TO PHQ9 QUESTIONS 1 & 2: 0
SUM OF ALL RESPONSES TO PHQ QUESTIONS 1-9: 0
2. FEELING DOWN, DEPRESSED OR HOPELESS: 0
SUM OF ALL RESPONSES TO PHQ QUESTIONS 1-9: 0
SUM OF ALL RESPONSES TO PHQ QUESTIONS 1-9: 0
1. LITTLE INTEREST OR PLEASURE IN DOING THINGS: 0
SUM OF ALL RESPONSES TO PHQ QUESTIONS 1-9: 0

## 2022-01-31 NOTE — PROGRESS NOTES
Visit Information    Have you changed or started any medications since your last visit including any over-the-counter medicines, vitamins, or herbal medicines? no   Have you stopped taking any of your medications? Is so, why? -  yes -   Are you having any side effects from any of your medications? - yes -     Have you seen any other physician or provider since your last visit?  no   Have you had any other diagnostic tests since your last visit?  no   Have you been seen in the emergency room and/or had an admission in a hospital since we last saw you?  no   Have you had your routine dental cleaning in the past 6 months?  no     Do you have an active MyChart account? If no, what is the barrier?   Yes    Patient Care Team:  DAV Cuevas CNP as PCP - General (Family Medicine)  DAV Cuevas CNP as PCP - [facility] Provider    Medical History Review  Past Medical, Family, and Social History reviewed and does contribute to the patient presenting condition    Health Maintenance   Topic Date Due    COVID-19 Vaccine (1) Never done    Pneumococcal 0-64 years Vaccine (1 of 2 - PPSV23) Never done    Diabetic foot exam  Never done    Diabetic retinal exam  Never done    Hepatitis B vaccine (1 of 3 - Risk 3-dose series) Never done    DTaP/Tdap/Td vaccine (1 - Tdap) Never done    Breast cancer screen  Never done    Colon cancer screen colonoscopy  Never done    Flu vaccine (1) Never done    Diabetic microalbuminuria test  05/18/2022    Lipid screen  05/18/2022    A1C test (Diabetic or Prediabetic)  06/14/2022    Depression Monitoring  10/29/2022    Hepatitis C screen  Completed    HIV screen  Completed    Hepatitis A vaccine  Aged Out    Hib vaccine  Aged Out    Meningococcal (ACWY) vaccine  Aged Out

## 2022-02-01 ENCOUNTER — HOSPITAL ENCOUNTER (OUTPATIENT)
Age: 47
Setting detail: SPECIMEN
Discharge: HOME | End: 2022-02-01

## 2022-02-01 ENCOUNTER — NURSE ONLY (OUTPATIENT)
Dept: FAMILY MEDICINE CLINIC | Age: 47
End: 2022-02-01

## 2022-02-01 ENCOUNTER — TELEMEDICINE (OUTPATIENT)
Dept: FAMILY MEDICINE CLINIC | Age: 47
End: 2022-02-01
Payer: COMMERCIAL

## 2022-02-01 VITALS — WEIGHT: 168 LBS | BODY MASS INDEX: 32.98 KG/M2 | HEIGHT: 60 IN

## 2022-02-01 DIAGNOSIS — M79.7 PRIMARY FIBROMYALGIA SYNDROME: ICD-10-CM

## 2022-02-01 DIAGNOSIS — E78.2 MIXED HYPERLIPIDEMIA: ICD-10-CM

## 2022-02-01 DIAGNOSIS — E11.42 TYPE 2 DIABETES MELLITUS WITH DIABETIC POLYNEUROPATHY, WITHOUT LONG-TERM CURRENT USE OF INSULIN (HCC): ICD-10-CM

## 2022-02-01 DIAGNOSIS — M54.16 LUMBAR RADICULOPATHY: ICD-10-CM

## 2022-02-01 DIAGNOSIS — Z20.822 SUSPECTED 2019 NOVEL CORONAVIRUS INFECTION: Primary | ICD-10-CM

## 2022-02-01 DIAGNOSIS — Z12.11 COLON CANCER SCREENING: ICD-10-CM

## 2022-02-01 DIAGNOSIS — Z88.9 MULTIPLE ALLERGIES: ICD-10-CM

## 2022-02-01 DIAGNOSIS — Z20.822 SUSPECTED 2019 NOVEL CORONAVIRUS INFECTION: ICD-10-CM

## 2022-02-01 DIAGNOSIS — F51.04 PSYCHOPHYSIOLOGICAL INSOMNIA: ICD-10-CM

## 2022-02-01 LAB
DIRECT EXAM: NORMAL
INFLUENZA A ANTIBODY: NEGATIVE
INFLUENZA B ANTIBODY: NEGATIVE
Lab: NORMAL
SPECIMEN DESCRIPTION: NORMAL

## 2022-02-01 PROCEDURE — G8427 DOCREV CUR MEDS BY ELIG CLIN: HCPCS | Performed by: FAMILY MEDICINE

## 2022-02-01 PROCEDURE — 87804 INFLUENZA ASSAY W/OPTIC: CPT | Performed by: FAMILY MEDICINE

## 2022-02-01 PROCEDURE — 2022F DILAT RTA XM EVC RTNOPTHY: CPT | Performed by: FAMILY MEDICINE

## 2022-02-01 PROCEDURE — 3046F HEMOGLOBIN A1C LEVEL >9.0%: CPT | Performed by: FAMILY MEDICINE

## 2022-02-01 PROCEDURE — 99214 OFFICE O/P EST MOD 30 MIN: CPT | Performed by: FAMILY MEDICINE

## 2022-02-01 RX ORDER — CHLORHEXIDINE GLUCONATE 0.12 MG/ML
15 RINSE ORAL 2 TIMES DAILY
Qty: 1 EACH | Refills: 2 | Status: SHIPPED | OUTPATIENT
Start: 2022-02-01 | End: 2022-04-18

## 2022-02-01 RX ORDER — LANOLIN ALCOHOL/MO/W.PET/CERES
3 CREAM (GRAM) TOPICAL NIGHTLY PRN
Qty: 30 TABLET | Refills: 3 | Status: SHIPPED | OUTPATIENT
Start: 2022-02-01 | End: 2022-06-01 | Stop reason: ALTCHOICE

## 2022-02-01 ASSESSMENT — ENCOUNTER SYMPTOMS
SINUS PRESSURE: 1
SORE THROAT: 1
SINUS PAIN: 1

## 2022-02-01 NOTE — PATIENT INSTRUCTIONS
Patient Education        Learning About Carbohydrate (Carb) Counting and Eating Out When You Have Diabetes  Why plan your meals? Meal planning can be a key part of managing diabetes. Planning meals and snacks with the right balance of carbohydrate, protein, and fat can help you keep your blood sugar at the target level you set with your doctor. You don't have to eat special foods. You can eat what your family eats, including sweets once in a while. But you do have to pay attention to how often you eat and how much you eat of certain foods. You may want to work with a dietitian or a certified diabetes educator. He or she can give you tips and meal ideas and can answer your questions about meal planning. This health professional can also help you reach a healthy weight if that is one of your goals. What should you know about eating carbs? Managing the amount of carbohydrate (carbs) you eat is an important part of healthy meals when you have diabetes. Carbohydrate is found in many foods. · Learn which foods have carbs. And learn the amounts of carbs in different foods. ? Bread, cereal, pasta, and rice have about 15 grams of carbs in a serving. A serving is 1 slice of bread (1 ounce), ½ cup of cooked cereal, or 1/3 cup of cooked pasta or rice. ? Fruits have 15 grams of carbs in a serving. A serving is 1 small fresh fruit, such as an apple or orange; ½ of a banana; ½ cup of cooked or canned fruit; ½ cup of fruit juice; 1 cup of melon or raspberries; or 2 tablespoons of dried fruit. ? Milk and no-sugar-added yogurt have 15 grams of carbs in a serving. A serving is 1 cup of milk or 2/3 cup of no-sugar-added yogurt. ? Starchy vegetables have 15 grams of carbs in a serving. A serving is ½ cup of mashed potatoes or sweet potato; 1 cup winter squash; ½ of a small baked potato; ½ cup of cooked beans; or ½ cup cooked corn or green peas.   · Learn how much carbs to eat each day and at each meal. A dietitian or CDE can teach you how to keep track of the amount of carbs you eat. This is called carbohydrate counting. · If you are not sure how to count carbohydrate grams, use the Plate Method to plan meals. It is a good, quick way to make sure that you have a balanced meal. It also helps you spread carbs throughout the day. ? Divide your plate by types of foods. Put non-starchy vegetables on half the plate, meat or other protein food on one-quarter of the plate, and a grain or starchy vegetable in the final quarter of the plate. To this you can add a small piece of fruit and 1 cup of milk or yogurt, depending on how many carbs you are supposed to eat at a meal.  · Try to eat about the same amount of carbs at each meal. Do not \"save up\" your daily allowance of carbs to eat at one meal.  · Proteins have very little or no carbs per serving. Examples of proteins are beef, chicken, turkey, fish, eggs, tofu, cheese, cottage cheese, and peanut butter. A serving size of meat is 3 ounces, which is about the size of a deck of cards. Examples of meat substitute serving sizes (equal to 1 ounce of meat) are 1/4 cup of cottage cheese, 1 egg, 1 tablespoon of peanut butter, and ½ cup of tofu. How can you eat out and still eat healthy? · Learn to estimate the serving sizes of foods that have carbohydrate. If you measure food at home, it will be easier to estimate the amount in a serving of restaurant food. · If the meal you order has too much carbohydrate (such as potatoes, corn, or baked beans), ask to have a low-carbohydrate food instead. Ask for a salad or green vegetables. · If you use insulin, check your blood sugar before and after eating out to help you plan how much to eat in the future. · If you eat more carbohydrate at a meal than you had planned, take a walk or do other exercise. This will help lower your blood sugar. What are some tips for eating healthy? · Limit saturated fat, such as the fat from meat and dairy products.  This is a healthy choice because people who have diabetes are at higher risk of heart disease. So choose lean cuts of meat and nonfat or low-fat dairy products. Use olive or canola oil instead of butter or shortening when cooking. · Don't skip meals. Your blood sugar may drop too low if you skip meals and take insulin or certain medicines for diabetes. · Check with your doctor before you drink alcohol. Alcohol can cause your blood sugar to drop too low. Alcohol can also cause a bad reaction if you take certain diabetes medicines. Follow-up care is a key part of your treatment and safety. Be sure to make and go to all appointments, and call your doctor if you are having problems. It's also a good idea to know your test results and keep a list of the medicines you take. Where can you learn more? Go to [email or web address] and sign in to your [email or web address] account. Enter Z904 in the Zelnas box to learn more about \"Learning About Carbohydrate (Carb) Counting and Eating Out When You Have Diabetes. \"     If you do not have an account, please click on the \"Sign Up Now\" link. Current as of: September 8, 2021               Content Version: 13.1  © 1222-0750 Healthwise, Incorporated. Care instructions adapted under license by [facility] ([facility]). If you have questions about a medical condition or this instruction, always ask your healthcare professional. [place] [address] any warranty or liability for your use of this information.

## 2022-02-02 LAB
SARS-COV-2: NORMAL
SARS-COV-2: NOT DETECTED
SOURCE: NORMAL

## 2022-02-23 DIAGNOSIS — T21.21XA PARTIAL THICKNESS BURN OF CHEST WALL, INITIAL ENCOUNTER: ICD-10-CM

## 2022-02-23 DIAGNOSIS — T20.20XA PARTIAL THICKNESS BURN OF FACE, INITIAL ENCOUNTER: ICD-10-CM

## 2022-02-23 RX ORDER — ISOPROPYL ALCOHOL 0.7 ML/ML
SWAB TOPICAL
Qty: 100 EACH | Refills: 5 | Status: SHIPPED | OUTPATIENT
Start: 2022-02-23 | End: 2022-06-01 | Stop reason: ALTCHOICE

## 2022-02-23 NOTE — TELEPHONE ENCOUNTER
Please Approve or Refuse.   Send to Pharmacy per Pt's Request:      Next Visit Date:  6/1/2022   Last Visit Date: 2/1/2022    Hemoglobin A1C (%)   Date Value   06/14/2021 8.7   03/18/2021 9.2 (H)   06/11/2018 6.9             ( goal A1C is < 7)   BP Readings from Last 3 Encounters:   10/29/21 128/68   06/14/21 124/78   03/15/21 110/80          (goal 120/80)  BUN   Date Value Ref Range Status   05/18/2021 9 6 - 20 mg/dL Final     CREATININE   Date Value Ref Range Status   05/18/2021 0.88 0.50 - 0.90 mg/dL Final     Potassium   Date Value Ref Range Status   05/18/2021 4.4 3.7 - 5.3 mmol/L Final

## 2022-03-15 DIAGNOSIS — E11.42 TYPE 2 DIABETES MELLITUS WITH DIABETIC POLYNEUROPATHY, WITHOUT LONG-TERM CURRENT USE OF INSULIN (HCC): ICD-10-CM

## 2022-03-15 RX ORDER — METFORMIN HYDROCHLORIDE 500 MG/1
TABLET, EXTENDED RELEASE ORAL
Qty: 120 TABLET | Refills: 2 | Status: SHIPPED | OUTPATIENT
Start: 2022-03-15 | End: 2022-06-01 | Stop reason: SDUPTHER

## 2022-04-08 DIAGNOSIS — J30.2 OTHER SEASONAL ALLERGIC RHINITIS: ICD-10-CM

## 2022-04-08 DIAGNOSIS — E11.42 TYPE 2 DIABETES MELLITUS WITH DIABETIC POLYNEUROPATHY, WITHOUT LONG-TERM CURRENT USE OF INSULIN (HCC): ICD-10-CM

## 2022-04-08 RX ORDER — EMPAGLIFLOZIN 10 MG/1
1 TABLET, FILM COATED ORAL DAILY
Qty: 90 TABLET | Refills: 2 | Status: SHIPPED | OUTPATIENT
Start: 2022-04-08 | End: 2022-04-18

## 2022-04-08 RX ORDER — FLUTICASONE PROPIONATE 50 MCG
SPRAY, SUSPENSION (ML) NASAL
Qty: 16 G | Refills: 2 | Status: SHIPPED | OUTPATIENT
Start: 2022-04-08

## 2022-04-15 DIAGNOSIS — Z20.822 SUSPECTED 2019 NOVEL CORONAVIRUS INFECTION: ICD-10-CM

## 2022-04-18 ENCOUNTER — OFFICE VISIT (OUTPATIENT)
Dept: GASTROENTEROLOGY | Age: 47
End: 2022-04-18
Payer: COMMERCIAL

## 2022-04-18 ENCOUNTER — TELEPHONE (OUTPATIENT)
Dept: GASTROENTEROLOGY | Age: 47
End: 2022-04-18

## 2022-04-18 VITALS — SYSTOLIC BLOOD PRESSURE: 125 MMHG | DIASTOLIC BLOOD PRESSURE: 84 MMHG | BODY MASS INDEX: 34.96 KG/M2 | WEIGHT: 179 LBS

## 2022-04-18 DIAGNOSIS — K76.0 NAFLD (NONALCOHOLIC FATTY LIVER DISEASE): Primary | ICD-10-CM

## 2022-04-18 PROCEDURE — G8427 DOCREV CUR MEDS BY ELIG CLIN: HCPCS | Performed by: INTERNAL MEDICINE

## 2022-04-18 PROCEDURE — 4004F PT TOBACCO SCREEN RCVD TLK: CPT | Performed by: INTERNAL MEDICINE

## 2022-04-18 PROCEDURE — G8417 CALC BMI ABV UP PARAM F/U: HCPCS | Performed by: INTERNAL MEDICINE

## 2022-04-18 PROCEDURE — 99213 OFFICE O/P EST LOW 20 MIN: CPT | Performed by: INTERNAL MEDICINE

## 2022-04-18 RX ORDER — CHLORHEXIDINE GLUCONATE 0.12 MG/ML
15 RINSE ORAL 2 TIMES DAILY
Qty: 473 ML | Refills: 2 | Status: SHIPPED | OUTPATIENT
Start: 2022-04-18

## 2022-04-18 RX ORDER — DOCUSATE SODIUM 100 MG/1
100 CAPSULE, LIQUID FILLED ORAL 2 TIMES DAILY
Qty: 60 CAPSULE | Refills: 5 | Status: SHIPPED | OUTPATIENT
Start: 2022-04-18 | End: 2022-05-18

## 2022-04-18 ASSESSMENT — ENCOUNTER SYMPTOMS
ALLERGIC/IMMUNOLOGIC NEGATIVE: 1
DIARRHEA: 1
RESPIRATORY NEGATIVE: 1
ABDOMINAL PAIN: 1
CONSTIPATION: 1
EYES NEGATIVE: 1
ABDOMINAL DISTENTION: 1
VOMITING: 1
NAUSEA: 1

## 2022-04-18 NOTE — TELEPHONE ENCOUNTER
Patient called as she was making sure she should come in as she has been having stomach issues and vomiting when she eats. She states she does not have a fever. Writer to her she should come in so the Doctor can help her with this.

## 2022-04-18 NOTE — PROGRESS NOTES
GI CLINIC FOLLOW UP    INTERVAL HISTORY:   No referring provider defined for this encounter. Chief Complaint   Patient presents with    Follow-up     abd pain, fatty liver            HISTORY OF PRESENT ILLNESS: Donna Velasquez is a 40 y.o. female nonalcoholic fatty liver disease. She reports diffuse abdominal discomfort for the past week. Altered bowel habits. Some loose bowel movements. She normally has constipation for which she takes fiber supplementation. She has been trying to lose weight. She reports having difficulty losing weight recently due to her chronic sinusitis. Past Medical,Family, and Social History reviewed and does not contribute to the patient presenting condition. Patient's PMH/PSH,SH,PSYCH Hx, MEDs, ALLERGIES, and ROS were all reviewed and updated in the appropriate sections.     PAST MEDICAL HISTORY:  Past Medical History:   Diagnosis Date    Abdominal pain     Antinuclear factor positive     Anxiety 1/16/2020    Arthritis     osteoarthritis    Autoimmune disease ([facility] [address]     unknown exactly, being worked up   [employer] severe obesity due to excess calories with serious comorbidity and body mass index (BMI) of 36.0 to 36.9 in adult 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[address]     Sjogren's disease ([facility] [address]     Type 2 diabetes mellitus with diabetic polyneuropathy ([facility] [address] 5/24/2018       Past Surgical History:   Procedure Laterality Date    HYSTERECTOMY      partial hysterectomy    PAROTIDECTOMY Right     TONSILLECTOMY      UPPER GASTROINTESTINAL ENDOSCOPY  01/26/2016 chronic inactive gastritis    VARICOSE VEIN SURGERY Right        CURRENT MEDICATIONS:    Current Outpatient Medications:     chlorhexidine (PERIDEX) 0.12 % solution, TAKE 15 MLS BY MOUTH 2 TIMES DAILY, Disp: 473 mL, Rfl: 2    fluticasone (FLONASE) 50 MCG/ACT nasal spray, PLACE 1 SPRAY IN EACH NOSTRIL 2 TIMES DAILY, Disp: 16 g, Rfl: 2    JARDIANCE 10 MG tablet, TAKE 1 TABLET BY MOUTH DAILY, Disp: 90 tablet, Rfl: 2    metFORMIN (GLUCOPHAGE-XR) 500 MG extended release tablet, TAKE 2 TABLETS BY MOUTH 2 TIMES DAILY, Disp: 120 tablet, Rfl: 2    Alcohol Swabs (PHARMACIST CHOICE ALCOHOL) PADS, PATIENT CHECKS SUGAR 2-3 TIMES DAILY OR AS NEEDED, Disp: 100 each, Rfl: 5    silver sulfADIAZINE (SSD) 1 % cream, APPLY TOPICALLY DAILY. , Disp: 400 g, Rfl: 2    melatonin 3 MG TABS tablet, Take 1 tablet by mouth nightly as needed (sleep), Disp: 30 tablet, Rfl: 3    pravastatin (PRAVACHOL) 20 MG tablet, TAKE 1 TABLET BY MOUTH DAILY, Disp: 90 tablet, Rfl: 2    omeprazole (PRILOSEC) 20 MG delayed release capsule, TAKE 1 CAPSULE BY MOUTH ONCE DAILY AS NEEDED, Disp: 90 capsule, Rfl: 2    fexofenadine (ALLEGRA) 180 MG tablet, Take 1 tablet by mouth daily, Disp: 90 tablet, Rfl: 5    naproxen (NAPROSYN) 500 MG tablet, Take 1 tablet by mouth 2 times daily (with meals), Disp: 180 tablet, Rfl: 1    TRUE METRIX BLOOD GLUCOSE TEST strip, CHECK BLOOD SUGAR 2 TIMES DAILY, Disp: 100 each, Rfl: 5    ketotifen (ZADITOR) 0.025 % ophthalmic solution, PLACE 1 DROP INTO BOTH EYES 2 TIMES DAILY, Disp: 5 mL, Rfl: 0    EPINEPHrine (EPIPEN) 0.3 MG/0.3ML SOAJ injection, Use as directed (Patient not taking: Reported on 1/31/2022), Disp: 2 each, Rfl: 0    Cholecalciferol (VITAMIN D3) 1.25 MG (50606 UT) CAPS, Take by mouth, Disp: , Rfl:     nystatin (MYCOSTATIN) 661483 UNIT/GM powder, Apply topically 4 times daily. , Disp: 1 Bottle, Rfl: 5    Handicap Placard MISC, by Does not apply route Expires December 2025, Disp: 1 each, Rfl: 0   cyclobenzaprine (FLEXERIL) 10 MG tablet, Take 1 tablet by mouth 3 times daily as needed, Disp: , Rfl:     Elastic Bandages & Supports (AIRCAST SPORT ANKLE BRACE/RGHT) MISC, Wear all the time (Patient not taking: Reported on 1/31/2022), Disp: 1 each, Rfl: 0    TRUEPLUS LANCETS 28G MISC, 1 (ONE) MISC MISC TWO TIMES DAILY, Disp: , Rfl:     ALLERGIES:   Allergies   Allergen Reactions    Morphine Swelling    Penicillins Swelling    Amoxicillin     Hydrocodone-Acetaminophen     Oxycodone-Acetaminophen     Percocet [Oxycodone-Acetaminophen]     Trazodone Hcl Other (See Comments)    Ultram [Tramadol]     Vicodin [Hydrocodone-Acetaminophen]        FAMILY HISTORY:       Problem Relation Age of Onset    Breast Cancer Mother         age 27   [facility] High Blood Pressure Mother     Diabetes Mother     High Blood Pressure Maternal Grandfather     Diabetes Maternal Grandfather          SOCIAL HISTORY:   Social History     Socioeconomic History    Marital status:      Spouse name: Not on file    Number of children: Not on file    Years of education: Not on file    Highest education level: Not on file   Occupational History    Not on file   Tobacco Use    Smoking status: Current Every Day Smoker     Packs/day: 0.50     Years: 27.00     Pack years: 13.50     Types: Cigarettes    Smokeless tobacco: Never Used   Substance and Sexual Activity    Alcohol use: No    Drug use: No    Sexual activity: Not on file   Other Topics Concern    Not on file   Social History Narrative    Not on file     Social Determinants of Health     Financial Resource Strain: Low Risk     Difficulty of Paying Living Expenses: Not very hard   Food Insecurity: No Food Insecurity    Worried About Running Out of Food in the Last Year: Never true    Zoe of Food in the Last Year: Never true   Transportation Needs:     Lack of Transportation (Medical): Not on file    Lack of Transportation (Non-Medical):  Not on file   Physical Activity:     Days of Exercise per Week: Not on file    Minutes of Exercise per Session: Not on file   Stress:     Feeling of Stress : Not on file   Social Connections:     Frequency of Communication with Friends and Family: Not on file    Frequency of Social Gatherings with Friends and Family: Not on file    Attends Mandaen Services: Not on file    Active Member of [address] [place] [address] or Organizations: Not on file    Attends Club or Organization Meetings: Not on file    Marital Status: Not on file   Intimate Partner Violence:     Fear of Current or Ex-Partner: Not on file    Emotionally Abused: Not on file    Physically Abused: Not on file    Sexually Abused: Not on file   Housing Stability:     Unable to Pay for Housing in the Last Year: Not on file    Number of [place] in the Last Year: Not on file    Unstable Housing in the Last Year: Not on file       REVIEW OF SYSTEMS: A 12-point review of systemswas obtained and pertinent positives and negatives were enumerated above in the history of present illness. All other reviewed systems / symptoms were negative. Review of Systems   Constitutional: Positive for appetite change. HENT: Negative. Eyes: Negative. Respiratory: Negative. Cardiovascular: Negative. Gastrointestinal: Positive for abdominal distention, abdominal pain, constipation, diarrhea, nausea and vomiting. Endocrine: Negative. Genitourinary: Negative. Musculoskeletal: Negative. Skin: Negative. Allergic/Immunologic: Negative. Neurological: Negative. Hematological: Negative. Psychiatric/Behavioral: Negative.             LABORATORY DATA: Reviewed  Lab Results   Component Value Date    WBC 7.7 05/18/2021    HGB 14.5 05/18/2021    HCT 43.4 05/18/2021    MCV 88.5 05/18/2021     05/18/2021     05/18/2021    K 4.4 05/18/2021     05/18/2021    CO2 27 05/18/2021    BUN 9 05/18/2021    CREATININE 0.88 05/18/2021    LABALBU 4.2 05/18/2021    BILITOT 0.47 05/18/2021    ALKPHOS 103 05/18/2021    AST 31 05/18/2021    ALT 51 (H) 05/18/2021    INR 1.1 01/28/2019         Lab Results   Component Value Date    RBC 4.91 05/18/2021    HGB 14.5 05/18/2021    MCV 88.5 05/18/2021    MCH 29.6 05/18/2021    MCHC 33.5 05/18/2021    RDW 13.8 05/18/2021    MPV 10.0 05/18/2021    BASOPCT 1 05/18/2021    LYMPHSABS 2.50 05/18/2021    MONOSABS 0.40 05/18/2021    NEUTROABS 4.40 05/18/2021    EOSABS 0.30 05/18/2021    BASOSABS 0.10 05/18/2021         DIAGNOSTIC TESTING:     No results found. PHYSICAL EXAMINATION: Vital signs reviewed per the nursing documentation. There were no vitals taken for this visit. There is no height or weight on file to calculate BMI. Physical Exam      I personally reviewed the nurse's notes and documentation and I agree with her notes. General: alert, appears stated age and cooperative Psych: Normal. and Alert and oriented, appropriate affect. . Normal affect. Mentation normal  HEENT: PERRLA. Clear conjunctivae and sclerae. Moist oral mucosae, no lesions or ulcers. The neck is supple, without lymphadenopathy or jugular venous distension. No masses. Normal thyroid. Cardiovascular: S1 S2 RRR no rubs or murmurs. Pulmonary: clear BL. No accessory muscle usage. Abdominal Exam: Soft, NT ND, no hepato or spleno megaly, +BS, no ascites. IMPRESSION: Ms. Sloane Parks is a 40 y.o. female with fatty liver disease. Diet, exercise, and weight loss. Altered bowel habits. Possible recent viral infection. Chronic constipation. Trial of Colace in addition to Citrucel. Follow-up in 6 months. Thank you for allowing me to participate in the care of Ms. Pond. For any further questions please do not hesitate to contact me. I have reviewed and agree with the ROS entered by the MA/LPN. Note is dictated utilizing voice recognition software. Unfortunately this leads to occasional typographical errors.  Please contact our office if you have any questions.     Kendall Engle MD  [facility]  O: #[phone]

## 2022-05-30 ASSESSMENT — ENCOUNTER SYMPTOMS
SHORTNESS OF BREATH: 0
COUGH: 0
DIARRHEA: 0
BACK PAIN: 1
NAUSEA: 0
ABDOMINAL PAIN: 0
CONSTIPATION: 0
RESPIRATORY NEGATIVE: 1

## 2022-05-30 NOTE — PROGRESS NOTES
a1c     [facility]  [facility]  [address]  [address] [address]  Dept: [phone]     2022   Chief Complaint   Patient presents with    Diabetes    Anxiety    Discuss Medications    Leg Pain     B/L PAIN     HPI  Anna Keller (:  1975) is a 40 y.o. female is an established patient. Patient has a history of diabetes, hyperlipidemia, fibromyalgia, lumbar radiculopathy, hepatic steatosis, depression, shoulder arthritis,     DIABETES MELLITUS  Patient has a  stable Diabetes Mellitus. Patient states that she changed diet-she is also exercising. Patient's recent hemoglobin A1c was 8.2% at her endocrinologist office. A1C today 9.3. Will increase metformin. Lab Results   Component Value Date    LABA1C 9.3 2022   . Current therapy includes metformin, Jardiance. Glipizide 2.5 daily until glucose stabilizesWill increase metformin dose. Given some Jardiance to take just recently had this medication approved by insurance. Patient is responding poorly with this therapy. Patient reports intermittent home glucose monitoring as Stable readings. Reports hyperglycemia episode in January when she was sick, blood sugar was over 380, and she was taking an antibiotic in January. Patient denies reports hypoglycemia episodes 2-3 in the past 6 months such as dizziness and shakiness. Patient denies blurred vision. The patient has not seen an ophthalmologist with in one last year. The patient is  on ACE inhibitors. The patient has not a foot examination. The patient has not seen an podiatrist with in last year. Lab Results   Component Value Date/Time    LABA1C 9.3 2022 09:39 AM    LABA1C 8.7 2021 08:33 AM    LABA1C 9.2 (H) 2021 08:53 AM     [address] is currently on pravastatin (Pravachol). She has been on this medicine from patient denies adverse reaction to this medication.  Compliance with treatment thus far has been good. The patient is not known to have coexisting coronary artery disease. The 10-year CVD risk score (SUKHWINDER'Agoino, et al., 2008) is: 18.5%    Values used to calculate the score:      Age: 40 years      Sex: Female      Diabetic: Yes      Tobacco smoker: Yes      Systolic Blood Pressure: 122 mmHg      Is BP treated: No      HDL Cholesterol: 38 mg/dL      Total Cholesterol: 224 mg/dL  Lab Results   Component Value Date/Time    CHOLFAST 224 (H) 05/18/2021 09:11 AM    CHOL 193 10/08/2019 11:36 AM    HDL 38 (L) 05/18/2021 09:11 AM    LDLCHOLESTEROL 153 (H) 05/18/2021 09:11 AM    TRIGLYCFAST 163 (H) 05/18/2021 09:11 AM    CHOLHDLRATIO 5.9 (H) 05/18/2021 09:11 AM    TRIG 165 (H) 10/08/2019 11:36 AM    VLDL NOT REPORTED (H) 05/18/2021 09:11 AM     FIBROMYALGIA  Patient with a known general body aches due to fbromyalgia. No complaints of worsening pain. Patient reported some worsening of body aches. Patient is not able to take any anti-inflammatory due to some drug insensitivity and GERD. Also. Patient have tried some gabapentin which did not work. Current therapy stopped Cymbalta, it was not helping. Since stopping the medication patient now has abdominal pain especially since she started back to working. 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 SHOULDER ARTHRITIS  Patient has a known arthritis of both shoulders. We did not discuss this much today. She has had some bilateral spasms advised to report for worsening symptoms tightening of both trapezius muscles for a long time. She has had massages in the past which helped. She is requesting physical therapy massage therapy referral today which will be provided for her. Patient will also be sent for physical therapy sessions for her fibromyalgia.   Naproxen, Flexeril as needed and it helps enough    ADJUSTMENT DISORDER/PTSD/ ANXIETY  Anxiety uncontrolled for five months, used to take xanax 0.25mg but cut each pill into 4 pieces. Mental health provider Christian Dubois no longer practices, so she needs to find a new one. Will try vraylar and vistaril as needed. She states that there are limited medications that she can take due to allergies and sensitivities. She had [employer] testing done and brought it in to the office. Patient appears to have more anxiety related to finances, work, health and relationships. We did not discuss her mental health conditions today depression screening. PREVIOUS NOTES  Patient had some significant history of PTSD and was under the care of psychiatrist and's counselors. However, patient had finished the course of therapy for now. Patient was encouraged to follow-up with a PTSD counselor /EMDR. She was given a list of places that she can see. She will be trying to call this people up will be calling her office back for any other help she needs. Patient is not currently on any therapy which is her choice since she has a lot of sensitivity issues. Declined Buspirone, Counselors. Will call insurance company to find psychiatrist for a new referral.   PHQ-2 Over the past 2 weeks, how often have you been bothered by any of the following problems? Little interest or pleasure in doing things: Not at all  Feeling down, depressed, or hopeless: Not at all  PHQ-2 Score: 0  PHQ-9 Over the past 2 weeks, how often have you been bothered by any of the following problems? Trouble falling or staying asleep, or sleeping too much: Nearly every day  Feeling tired or having little energy: Several days  Poor appetite or overeating: Not at all  Feeling bad about yourself - or that you are a failure or have let yourself or your family down: Not at all  Trouble concentrating on things, such as reading the newspaper or watching television: Not at all  Moving or speaking so slowly that other people could have noticed.  Or the opposite - being so fidgety or restless that you have been moving around a lot more than usual: Nearly every day  Thoughts that you would be better off dead, or of hurting yourself in some way: Not at all  If you checked off any problems, how difficult have these problems made it for you to do your work, take care of things at home, or get along with other people?: Not difficult at all  PHQ-9 Total Score: 7  PHQ-9 Total Score: 7    HEPATIC STEATOSIS  Patient was started on Vitamin E-she was told that she needs to lose weight before December. Otherwise, the gastroenterologist and liver biopsy due to several liver cyst that was found and sent CT of the abdomen. \  PREVIOUS NOTE  Patient complains of some right upper quadrant pain. Patient has had this pain for a while. Patient is being followed up by [facility]. She was told to have hepatic steatosis and also some cyst on her liver. CT of the abdomen was repeated once it was told that were no changes. She continues to have some mild wrap right upper quadrant pain not associated with any food intake. Pain is mild and achy. No issues with nausea, vomiting, or diarrhea. [facility] hospital Ultrasound was done. Patient had a liver lesion that was found on ultrasound. Patient was referred to the gastroenterologist.  CAT scan was also done. Radiologist noted that it was benign but she does have some hepatic steatosis. VITAMIN D  Patient has a known Vitamin D Deficiency. she had a course of supplementation for 12 weeks. she is due to get levels check. We continue to discuss ways to manage this condition. We also discussed ways to improve the levels. Such as learning food groups that are high in Vitamin D. We also discuss that sun exposure is beneficial however, too much sun and sun damage can potentially result in skin cancer. Lab Results   Component Value Date/Time    VITD25 23.4 (L) 05/18/2021 09:11 AM      WEIGHT  Patient's BMI is Body mass index is 35.86 kg/m². kg/m2. BMI is increasing.  Patient understands that this condition increases the patient's risk for chronic conditions. Wt Readings from Last 3 Encounters:   06/01/22 183 lb 9.6 oz (83.3 kg)   04/18/22 179 lb (81.2 kg)   02/01/22 168 lb (76.2 kg)       Patient Active Problem List   Diagnosis    Primary fibromyalgia syndrome    Antinuclear factor positive    Hepatic steatosis    Liver lesion    Gastritis    Elevated liver enzymes    Tobacco abuse    Anxiety    Type 2 diabetes mellitus without complication, without long-term current use of insulin (HCC)    Mixed hyperlipidemia    Gastro-esophageal reflux disease without esophagitis    Complete rotator cuff tear or rupture of right shoulder, not specified as traumatic    Nicotine dependence, cigarettes, uncomplicated    Other seasonal allergic rhinitis    Vitamin D deficiency    Pure hypercholesterolemia    Diabetes mellitus ([facility] [address]    Family history of malignant neoplasm of breast    Type 2 diabetes mellitus with diabetic polyneuropathy ([facility] [address]    BMI 36.0-36.9,adult    Class 2 severe obesity due to excess calories with serious comorbidity and body mass index (BMI) of 36.0 to 36.9 in adult (HCC)    Moderate episode of recurrent major depressive disorder (HCC)    PTSD (post-traumatic stress disorder)    Lumbar radiculopathy    Bilateral shoulder region arthritis    Psychophysiological insomnia      Review of Systems   Constitutional: Negative for activity change, chills and fever. HENT: Negative for congestion. Respiratory: Negative. Negative for cough and shortness of breath. Cardiovascular: Negative. Negative for chest pain and palpitations. Gastrointestinal: Negative for abdominal pain, constipation, diarrhea and nausea. Endocrine: Negative for cold intolerance. Musculoskeletal: Positive for arthralgias, back pain and myalgias. Negative for gait problem. General myalgias   Skin: Negative for rash. Allergic/Immunologic: Positive for environmental allergies and food allergies. Neurological: Positive for numbness.  Negative for headaches. Psychiatric/Behavioral: Positive for sleep disturbance. Negative for suicidal ideas. The patient is nervous/anxious. Vitals:    06/01/22 0935   BP: 134/76   Pulse: 97   Temp: 97.6 °F (36.4 °C)   SpO2: 96%   Weight: 183 lb 9.6 oz (83.3 kg)   Height: 5' (1.524 m)     Estimated body mass index is 35.86 kg/m² as calculated from the following:    Height as of this encounter: 5' (1.524 m). Weight as of this encounter: 183 lb 9.6 oz (83.3 kg). Physical Exam  Vitals and nursing note reviewed. Constitutional:       Appearance: Normal appearance. She is obese. HENT:      Head: Normocephalic. Nose: Nose normal.   Cardiovascular:      Rate and Rhythm: Normal rate and regular rhythm. Pulmonary:      Effort: Pulmonary effort is normal.      Breath sounds: Normal breath sounds. Abdominal:      General: Abdomen is protuberant. Comments: obese   Skin:     General: Skin is warm and dry. Neurological:      Mental Status: She is alert and oriented to person, place, and time. Psychiatric:         Mood and Affect: Mood is anxious. Speech: Speech is not rapid and pressured. Thought Content: Thought content does not include suicidal ideation. ASSESSMENT/PLAN:  1. Type 2 diabetes mellitus with diabetic polyneuropathy, without long-term current use of insulin (HCC)  Failure to improve. Continue therapy, increase metformin. We will continue to monitor Hemoglobin A1c   DISCUSSED and ADVISED TO:  Continue to check Glucose levels at home. Report increased and low levels as discussed. Decrease carbohydrates, sugary drinks, desserts in your diet. Exercise regularly, as tolerated. Try to lose weight.    - POCT glycosylated hemoglobin (Hb A1C); Future  - CBC with Auto Differential; Future  - Comprehensive Metabolic Panel, Fasting; Future  - Urinalysis with Reflex to Culture;  Future  - POCT glycosylated hemoglobin (Hb A1C)  - metFORMIN (GLUCOPHAGE-XR) 500 mg extended release tablet; Take 2 tablets by mouth daily (with breakfast)  Dispense: 180 tablet; Refill: 2  - JARDIANCE 10 MG tablet; Take 1 tablet by mouth daily  Dispense: 90 tablet; Refill: 2  - glipiZIDE (GLUCOTROL) 5 mg tablet; Take 0.5 tablets by mouth daily  Dispense: 30 tablet; Refill: 3    2. Moderate episode of recurrent major depressive disorder (HCC)  Failure to improve. Will try vraylar and vistaril. DISCUSSED and ADVISED TO:  Not stopping medication suddenly. See the specialist as discussed. Report for feelings of SI, HI, and hallucinations. Go to the ER for increasing urge to hurt yourself. - cariprazine hcl (VRAYLAR) 1.5 MG capsule; Take 1 capsule by mouth daily  Dispense: 30 capsule; Refill: 0  - hydrOXYzine (ATARAX) 10 MG tablet; Take 1 tablet by mouth 4 times daily as needed for Anxiety  Dispense: 90 tablet; Refill: 2    3. Mixed hyperlipidemia  Stable. Continue therapy. Advised to decrease the consumption of red meats, fried foods, trans fats, sweets, sugary beverages. Advised to increase fish, vegetables, and fruits consumption. Advised to add fiber or OTC supplements in diet. Discussed weight loss which will result in improvement of lipids levels. Advised to increase daily physical activities and add regular exercises. - Comprehensive Metabolic Panel, Fasting; Future  - Lipid, Fasting; Future    4. Primary fibromyalgia syndrome  Stable. DISCUSSED AND ADVISED TO:  Exercise often. Get a good night's sleep. Make healthy changes to diet. Quit smoking   Try to reduce stress  Carefully take medications as discussed  Report for worsening symptoms     5. Lumbar radiculopathy  Stable. Continue current therapy. Encouraged to exercise as tolerated and stretch as tolerated. 6. Hepatic steatosis  Stable. Continue current therapy. Advised to avoid fatty foods. 7. Gastro-esophageal reflux disease without esophagitis  Stable. Continue therapy  DISCUSSED AND ADVISED TO:  Avoid food triggers.   Stop eating large meals close to bedtime  Don't eat meals too close to bedtime  Avoid ASA, NSAID's, caffeine, peppermints, alcohol and tobacco.  Report for worsening symptoms. 8. Psychophysiological insomnia  Stable. Continue current routine or therapy. DISCUSSED AND ADVISED TO:  Cut down intake of drinks with caffeine, such as coffee or black tea, for 8 hours before bed. Cut down on smoking or use other types of tobacco near bedtime. Cut down on Nicotine and alcohol   Stop eating a big meal close to bedtime. Stop drinking a lot of  fluids close to bedtime. 9. Other seasonal allergic rhinitis  Stable. Continue with the same therapy   ADVISED TO:  Avoid known allergens/irritants. Stop smoking or avoid second hand smoke. Stay hydrated. Report for worsening symptoms  - fexofenadine (ALLEGRA) 180 MG tablet; Take 1 tablet by mouth daily  Dispense: 90 tablet; Refill: 5    10. Vitamin D deficiency  Stable. Continue Vitamin D supplementation  DISCUSSED AND ADVISED TO:  Foods that contain a lot of vitamin D includes [place], tuna, and mackerel. Cheese, egg yolks, and beef liver have small amounts of vit D.  Milk, soy drinks, orange juice, yogurt, margarine, and some kinds of cereal have vitamin D added to them. Continue to use sunblock when out in the sun to prevent skin cancer.  - Vitamin D 25 Hydroxy; Future      This note was completed by using the assistance of a speech-recognition program. However, inadvertent computerized transcription errors may be present. Although every effort was made to ensure accuracy, no guarantees can be provided that every mistake has been identified and corrected by editing   An electronic signature was used to authenticate this note.   --DAV Dodge - CNP on 6/1/2022 at 9:39 AM

## 2022-06-01 ENCOUNTER — OFFICE VISIT (OUTPATIENT)
Dept: FAMILY MEDICINE CLINIC | Age: 47
End: 2022-06-01
Payer: COMMERCIAL

## 2022-06-01 VITALS
BODY MASS INDEX: 36.05 KG/M2 | SYSTOLIC BLOOD PRESSURE: 134 MMHG | WEIGHT: 183.6 LBS | TEMPERATURE: 97.6 F | OXYGEN SATURATION: 96 % | HEART RATE: 97 BPM | DIASTOLIC BLOOD PRESSURE: 76 MMHG | HEIGHT: 60 IN

## 2022-06-01 DIAGNOSIS — E78.2 MIXED HYPERLIPIDEMIA: ICD-10-CM

## 2022-06-01 DIAGNOSIS — M79.7 PRIMARY FIBROMYALGIA SYNDROME: ICD-10-CM

## 2022-06-01 DIAGNOSIS — E55.9 VITAMIN D DEFICIENCY: ICD-10-CM

## 2022-06-01 DIAGNOSIS — E11.42 TYPE 2 DIABETES MELLITUS WITH DIABETIC POLYNEUROPATHY, WITHOUT LONG-TERM CURRENT USE OF INSULIN (HCC): Primary | ICD-10-CM

## 2022-06-01 DIAGNOSIS — F51.04 PSYCHOPHYSIOLOGICAL INSOMNIA: ICD-10-CM

## 2022-06-01 DIAGNOSIS — K21.9 GASTRO-ESOPHAGEAL REFLUX DISEASE WITHOUT ESOPHAGITIS: ICD-10-CM

## 2022-06-01 DIAGNOSIS — J30.2 OTHER SEASONAL ALLERGIC RHINITIS: ICD-10-CM

## 2022-06-01 DIAGNOSIS — M54.16 LUMBAR RADICULOPATHY: ICD-10-CM

## 2022-06-01 DIAGNOSIS — F33.1 MODERATE EPISODE OF RECURRENT MAJOR DEPRESSIVE DISORDER (HCC): ICD-10-CM

## 2022-06-01 DIAGNOSIS — K76.0 HEPATIC STEATOSIS: ICD-10-CM

## 2022-06-01 LAB — HBA1C MFR BLD: 9.3 %

## 2022-06-01 PROCEDURE — 99214 OFFICE O/P EST MOD 30 MIN: CPT | Performed by: FAMILY MEDICINE

## 2022-06-01 PROCEDURE — 2022F DILAT RTA XM EVC RTNOPTHY: CPT | Performed by: FAMILY MEDICINE

## 2022-06-01 PROCEDURE — G8427 DOCREV CUR MEDS BY ELIG CLIN: HCPCS | Performed by: FAMILY MEDICINE

## 2022-06-01 PROCEDURE — 83036 HEMOGLOBIN GLYCOSYLATED A1C: CPT | Performed by: FAMILY MEDICINE

## 2022-06-01 PROCEDURE — 4004F PT TOBACCO SCREEN RCVD TLK: CPT | Performed by: FAMILY MEDICINE

## 2022-06-01 PROCEDURE — G8417 CALC BMI ABV UP PARAM F/U: HCPCS | Performed by: FAMILY MEDICINE

## 2022-06-01 PROCEDURE — 3046F HEMOGLOBIN A1C LEVEL >9.0%: CPT | Performed by: FAMILY MEDICINE

## 2022-06-01 PROCEDURE — 81003 URINALYSIS AUTO W/O SCOPE: CPT | Performed by: FAMILY MEDICINE

## 2022-06-01 RX ORDER — FEXOFENADINE HCL 180 MG/1
180 TABLET ORAL DAILY
Qty: 90 TABLET | Refills: 5 | Status: SHIPPED | OUTPATIENT
Start: 2022-06-01 | End: 2022-10-06

## 2022-06-01 RX ORDER — METFORMIN HYDROCHLORIDE 500 MG/1
1000 TABLET, EXTENDED RELEASE ORAL
Qty: 180 TABLET | Refills: 2 | Status: SHIPPED | OUTPATIENT
Start: 2022-06-01

## 2022-06-01 RX ORDER — HYDROXYZINE HYDROCHLORIDE 10 MG/1
10 TABLET, FILM COATED ORAL 4 TIMES DAILY PRN
Qty: 90 TABLET | Refills: 2 | Status: SHIPPED | OUTPATIENT
Start: 2022-06-01 | End: 2022-07-01

## 2022-06-01 RX ORDER — EMPAGLIFLOZIN 10 MG/1
1 TABLET, FILM COATED ORAL DAILY
COMMUNITY
Start: 2022-05-11 | End: 2022-06-01 | Stop reason: SDUPTHER

## 2022-06-01 RX ORDER — EMPAGLIFLOZIN 10 MG/1
10 TABLET, FILM COATED ORAL DAILY
Qty: 90 TABLET | Refills: 2 | Status: SHIPPED | OUTPATIENT
Start: 2022-06-01 | End: 2022-10-06

## 2022-06-01 RX ORDER — GLIPIZIDE 5 MG/1
2.5 TABLET ORAL DAILY
Qty: 30 TABLET | Refills: 3 | Status: SHIPPED | OUTPATIENT
Start: 2022-06-01

## 2022-06-01 ASSESSMENT — PATIENT HEALTH QUESTIONNAIRE - PHQ9
SUM OF ALL RESPONSES TO PHQ QUESTIONS 1-9: 7
3. TROUBLE FALLING OR STAYING ASLEEP: 3
4. FEELING TIRED OR HAVING LITTLE ENERGY: 1
SUM OF ALL RESPONSES TO PHQ QUESTIONS 1-9: 7
SUM OF ALL RESPONSES TO PHQ QUESTIONS 1-9: 7
5. POOR APPETITE OR OVEREATING: 0
1. LITTLE INTEREST OR PLEASURE IN DOING THINGS: 0
SUM OF ALL RESPONSES TO PHQ9 QUESTIONS 1 & 2: 0
2. FEELING DOWN, DEPRESSED OR HOPELESS: 0
SUM OF ALL RESPONSES TO PHQ QUESTIONS 1-9: 7
10. IF YOU CHECKED OFF ANY PROBLEMS, HOW DIFFICULT HAVE THESE PROBLEMS MADE IT FOR YOU TO DO YOUR WORK, TAKE CARE OF THINGS AT HOME, OR GET ALONG WITH OTHER PEOPLE: 0
7. TROUBLE CONCENTRATING ON THINGS, SUCH AS READING THE NEWSPAPER OR WATCHING TELEVISION: 0
8. MOVING OR SPEAKING SO SLOWLY THAT OTHER PEOPLE COULD HAVE NOTICED. OR THE OPPOSITE, BEING SO FIGETY OR RESTLESS THAT YOU HAVE BEEN MOVING AROUND A LOT MORE THAN USUAL: 3
6. FEELING BAD ABOUT YOURSELF - OR THAT YOU ARE A FAILURE OR HAVE LET YOURSELF OR YOUR FAMILY DOWN: 0
9. THOUGHTS THAT YOU WOULD BE BETTER OFF DEAD, OR OF HURTING YOURSELF: 0

## 2022-06-01 NOTE — PATIENT INSTRUCTIONS
New Updates for [facility] MyChart/ [facility] ([facility]) DAVION    Thank you for choosing US to give you the best care! [employer] ([facility]) is always trying to think of new ways to help their patients. We are asking all patients to try out the new digital registration that is now available through your [facility] account or the new DAVION, [employer] ([facility]). Via the davion you're now able to update your personal and registration information prior to your upcoming appointment. This will save you time once you arrive at the office to check-in, not to mention your information remains safe!! Many other perks come from signing up for an account, such as:   Requesting refills   Scheduling an appointment   Completing an [address] Sending a message to the office/provider   Having access to your medication list   Paying your bill/copay prior to your appointment   Scheduling your yearly mammogram   Review your test results    If you are not familiar with [facility] or the [facility], please ask one of us and we will be happy to answer any questions or help you set-up your account. Your [facility] office,  [facility]    Patient Education        Fibromyalgia: Care Instructions  Overview     Fibromyalgia is a painful condition that is not completely understood by medical experts. The cause of fibromyalgia is not known. It can make you feel tired and ache all over. It causes tender spots at specific points of the body that hurt only when you press on them. You may have trouble sleeping, as wellas other symptoms. These problems can upset your work and home life. Symptoms tend to come and go, although they may never go away completely. Fibromyalgia does not harm your muscles, joints, or organs. Follow-up care is a key part of your treatment and safety. Be sure to make and go to all appointments, and call your doctor if you are having problems.  It's also a good idea to know your test results and keep alist of the medicines you take. How can you care for yourself at home?  Exercise often. Walk, swim, or bike to help with pain and sleep problems and to make you feel better.  Try to get a good night's sleep. Go to bed and get up at the same time each day, whether you feel rested or not. Make sure you have a good mattress and pillow.  Reduce stress. Avoid things that cause you stress, if you can. If not, work at making them less stressful. Learn to use biofeedback, guided imagery, meditation, or other methods to relax.  Make healthy changes. Eat a balanced diet, quit smoking, and limit alcohol and caffeine.  Use a heating pad set on low or take warm baths or showers for pain. Using cold packs for up to 20 minutes at a time can also relieve pain. Put a thin cloth between the cold pack and your skin. A gentle massage might help too.  Be safe with medicines. Take your medicines exactly as prescribed. Call your doctor if you think you are having a problem with your medicine. Your doctor may talk to you about taking antidepressant medicines. These medicines may improve sleep, relieve pain, and in some cases treat depression.  Learn about fibromyalgia. This makes coping easier. Then, take an active role in your treatment.  Think about joining a support group with others who have fibromyalgia to learn more and get support. When should you call for help? Watch closely for changes in your health, and be sure to contact your doctor if:     You feel sad, helpless, or hopeless; lose interest in things you used to enjoy; or have other symptoms of depression.      Your fibromyalgia symptoms get worse. Where can you learn more? Go to https://rebeka.[email or web address]. org and sign in to your [employer] account. Enter V003 in the [employer] box to learn more about \"Fibromyalgia: Care Instructions. \"     If you do not have an account, please click on the \"Sign Up Now\" link.   Current as of: December 13, 2021               Content Version: 13.2  © 9770-1868 Healthwise, Incorporated. Care instructions adapted under license by [facility] ([facility]). If you have questions about a medical condition or this instruction, always ask your healthcare professional. [address] any warranty or liability for your use of this information.

## 2022-06-01 NOTE — PROGRESS NOTES
Visit Information    Have you changed or started any medications since your last visit including any over-the-counter medicines, vitamins, or herbal medicines? no   Have you stopped taking any of your medications? Is so, why? -  yes -   Are you having any side effects from any of your medications? - no    Have you seen any other physician or provider since your last visit? yes -    Have you had any other diagnostic tests since your last visit?  no   Have you been seen in the emergency room and/or had an admission in a hospital since we last saw you?  no   Have you had your routine dental cleaning in the past 6 months?  no     Do you have an active MyChart account? If no, what is the barrier?   Yes    Patient Care Team:  DAV Cuevas CNP as PCP - General (Family Medicine)  DAV Cuevas CNP [facility] PCP [facility]    Medical History Review  Past Medical, Family, and Social History reviewed and does contribute to the patient presenting condition    Health Maintenance   Topic Date Due    COVID-19 Vaccine (1) Never done    Pneumococcal 0-64 years Vaccine (1 - PCV) Never done    Diabetic foot exam  Never done    Diabetic retinal exam  Never done    Hepatitis B vaccine (1 of 3 - Risk 3-dose series) Never done    DTaP/Tdap/Td vaccine (1 - Tdap) Never done    Breast cancer screen  Never done    Colorectal Cancer Screen  Never done    Diabetic microalbuminuria test  05/18/2022    Lipids  05/18/2022    A1C test (Diabetic or Prediabetic)  06/14/2022    Flu vaccine (Season Ended) 09/01/2022    Depression Monitoring  01/31/2023    Hepatitis C screen  Completed    HIV screen  Completed    Hepatitis A vaccine  Aged Out    Hib vaccine  Aged Out    Meningococcal (ACWY) vaccine  Aged Out

## 2022-06-29 ENCOUNTER — TELEPHONE (OUTPATIENT)
Dept: GASTROENTEROLOGY | Age: 47
End: 2022-06-29

## 2022-06-30 DIAGNOSIS — E11.42 TYPE 2 DIABETES MELLITUS WITH DIABETIC POLYNEUROPATHY, WITHOUT LONG-TERM CURRENT USE OF INSULIN (HCC): Primary | ICD-10-CM

## 2022-06-30 RX ORDER — DIPHENHYDRAMINE HYDROCHLORIDE 25 MG/1
CAPSULE, LIQUID FILLED ORAL
Qty: 1 KIT | Refills: 0 | Status: SHIPPED | OUTPATIENT
Start: 2022-06-30

## 2022-06-30 RX ORDER — GLUCOSAMINE HCL/CHONDROITIN SU 500-400 MG
CAPSULE ORAL
Qty: 100 STRIP | Refills: 11 | Status: SHIPPED | OUTPATIENT
Start: 2022-06-30 | End: 2022-09-02 | Stop reason: SDUPTHER

## 2022-06-30 RX ORDER — LANCETS 30 GAUGE
1 EACH MISCELLANEOUS 2 TIMES DAILY
Qty: 300 EACH | Refills: 11 | Status: SHIPPED | OUTPATIENT
Start: 2022-06-30

## 2022-06-30 RX ORDER — PEN NEEDLE, DIABETIC 31 GX5/16"
NEEDLE, DISPOSABLE MISCELLANEOUS
Qty: 100 EACH | Refills: 11 | Status: SHIPPED | OUTPATIENT
Start: 2022-06-30

## 2022-09-02 DIAGNOSIS — E11.42 TYPE 2 DIABETES MELLITUS WITH DIABETIC POLYNEUROPATHY, WITHOUT LONG-TERM CURRENT USE OF INSULIN (HCC): ICD-10-CM

## 2022-09-02 RX ORDER — GLUCOSAMINE HCL/CHONDROITIN SU 500-400 MG
CAPSULE ORAL
Qty: 100 STRIP | Refills: 11 | Status: SHIPPED | OUTPATIENT
Start: 2022-09-02

## 2022-09-26 DIAGNOSIS — J01.40 ACUTE NON-RECURRENT PANSINUSITIS: ICD-10-CM

## 2022-09-26 RX ORDER — NAPROXEN 500 MG/1
500 TABLET ORAL 2 TIMES DAILY WITH MEALS
Qty: 180 TABLET | Refills: 1 | Status: SHIPPED | OUTPATIENT
Start: 2022-09-26

## 2022-09-26 NOTE — TELEPHONE ENCOUNTER
Please Approve or Refuse.   Send to Pharmacy per Pt's Request:      Next Visit Date:  10/6/2022   Last Visit Date: 6/1/2022    Hemoglobin A1C (%)   Date Value   06/01/2022 9.3   06/14/2021 8.7   03/18/2021 9.2 (H)             ( goal A1C is < 7)   BP Readings from Last 3 Encounters:   06/01/22 134/76   04/18/22 125/84   10/29/21 128/68          (goal 120/80)  BUN   Date Value Ref Range Status   05/18/2021 9 6 - 20 mg/dL Final     Creatinine   Date Value Ref Range Status   05/18/2021 0.88 0.50 - 0.90 mg/dL Final     Potassium   Date Value Ref Range Status   05/18/2021 4.4 3.7 - 5.3 mmol/L Final

## 2022-10-02 RX ORDER — LIDOCAINE 50 MG/G
1 PATCH TOPICAL DAILY PRN
COMMUNITY
Start: 2022-09-04

## 2022-10-02 ASSESSMENT — ENCOUNTER SYMPTOMS
DIARRHEA: 0
RESPIRATORY NEGATIVE: 1
ABDOMINAL PAIN: 0
CONSTIPATION: 0
BACK PAIN: 1
SHORTNESS OF BREATH: 0
COUGH: 0
NAUSEA: 0

## 2022-10-02 NOTE — PROGRESS NOTES
[facility]  [facility]  [address]  [address] [address]  Dept: [phone]     10/6/2022   Chief Complaint   Patient presents with    Diabetes     Routines 3 month follow up    Flu Vaccine     Declines flu vaccine      HPI  Fabiano Mallory (:  1975) is a 56 y.o. female is an established patient. Patient has a history of diabetes, hyperlipidemia, fibromyalgia, lumbar radiculopathy, depression, shoulder arthritis, allergies, and obesity    Labs not done    LEFT HIP ARTHRITIS / Marialuisa Purpura is a 56 y.o. female patient. With worsening left hip arthritis. Seen at a [facility] facility. Xray below. Will send Dr Pierre Avilez. SEEN IN ED  FINDINGS:   Preserved lumbar lordosis. There is no pathologic malalignment in the lumbar spine. Vertebral body heights are preserved. There is no pathologic intervertebral disc space narrowing. Small endplate osteophytes are visualized. The visualized portion of sacrum is unremarkable. The visualized   portion of the SI joints appear symmetric. IMPRESSION:   * No acute osseous abnormality. Findings:   AP, lateral swimmer's view thoracic spine demonstrates multilevel degenerative disc disease. No vertebral body height loss. Impression:   No acute ossific abnormality. Findings:   AP pelvis and AP and lateral views of the left hip. There is no acute process, fracture or dislocation. Degenerative changes of the left hip with joint space loss and narrowing and osteophyte formation. If the patient is unable to bear weight or if there is concern for nondisplaced hip fracture, consider correlation with MRI. Impression:   No evidence of acute ossific abnormality. DIABETES MELLITUS- worsening. Patient has a  stable Diabetes Mellitus. Patient states that she changed diet-she is also exercising. . Current therapy includes metformin- just got filled, Jardiance back order.  Glipizidestop, start trulicity Patient is responding poorly with this therapy. Patient reports intermittent home glucose monitoring as Stable readings. Patient denies reports hypoglycemia episodes 2-3 in the past 6 months such as dizziness and shakiness. Patient denies blurred vision. The patient has not seen an ophthalmologist with in one last year. The patient has not a foot examination. The patient has not seen an podiatrist with in last year. Lab Results   Component Value Date/Time    LABA1C 9.4 (H) 10/06/2022 01:15 AM    LABA1C 9.3 06/01/2022 09:39 AM    LABA1C 8.7 06/14/2021 08:33 AM     HYPERLIPIDEMIA  Eric Diggs is currently on pravastatin (Pravachol). patient denies adverse reaction to this medication. Compliance with treatment thus far has been good. The patient is not known to have coexisting coronary artery disease. The 10-year CVD risk score (Amaino, et al., 2008) is: 18.6%    Values used to calculate the score:      Age: 56 years      Sex: Female      Diabetic: Yes      Tobacco smoker: Yes      Systolic Blood Pressure: 602 mmHg      Is BP treated: No      HDL Cholesterol: 38 mg/dL      Total Cholesterol: 224 mg/dL  Lab Results   Component Value Date/Time    CHOLFAST 224 (H) 05/18/2021 09:11 AM    CHOL 193 10/08/2019 11:36 AM    HDL 38 (L) 05/18/2021 09:11 AM    LDLCHOLESTEROL 153 (H) 05/18/2021 09:11 AM    TRIGLYCFAST 163 (H) 05/18/2021 09:11 AM    CHOLHDLRATIO 5.9 (H) 05/18/2021 09:11 AM    TRIG 165 (H) 10/08/2019 11:36 AM    VLDL NOT REPORTED (H) 05/18/2021 09:11 AM     FIBROMYALGIA  Patient with a known general body aches due to fbromyalgia. . Patient reported some worsening of body aches. Patient is not able to take any anti-inflammatory due to some drug insensitivity and GERD. Also. Patient have tried some gabapentin which did not work. Current therapy stopped Cymbalta, it was not helping. Since stopping the medication patient now has abdominal pain especially since she started back to working.   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 SHOULDER ARTHRITIS  Patient has a known arthritis of both shoulders. Had right shoulder surgery- bad experience. She has had some bilateral spasms advised to report for worsening symptoms tightening of both trapezius muscles for a long time. She has had massages in the past which helped. She is requesting physical therapy massage therapy referral today which will be provided for her. Patient will also be sent for physical therapy sessions for her fibromyalgia. Naproxen, Flexeril as needed and it helps enough    ALLERGIC RHINITIS  Fabiola Mcgowan is a 56 y.o. female patient  has a known history of Common allergies. Symptoms include: clear rhinorrhea and postnasal drip and present in a noneseasonal pattern. Patient reports precipitants which includes: Pollen, dust, smoke, etc. Treatment currently includes allegra declined will send zyxal.  relief of symptoms. ADJUSTMENT DISORDER/PTSD/ ANXIETY  Anxiety uncontrolled for a few months, used to take xanax 0.25mg but cut each pill into 4 pieces. Mental health provider Mira French no longer practices, so she needs to find a new one. Jane Victoria- denied by insrance and vistaril as needed. She states that there are limited medications that she can take due to allergies and sensitivities. She had MtoV testing done and brought it in to the office. Patient appears to have more anxiety related to finances, work, health and relationships. We did not discuss her mental health conditions today depression screening. PREVIOUS NOTES  Patient had some significant history of PTSD and was under the care of psychiatrist and's counselors. However, patient had finished the course of therapy for now. Patient was encouraged to follow-up with a PTSD counselor /EMDR. She was given a list of places that she can see.   She will be trying to call this people up will be calling her office back for any other help she needs. Patient is not currently on any therapy which is her choice since she has a lot of sensitivity issues. Declined Buspirone, Counselors. Will call insurance company to find psychiatrist for a new referral.               FRIEDA-7 SCREENING 1/23/2020   Feeling nervous, anxious, or on edge 3-Nearly every day   Not able to stop or control worrying 3-Nearly every day   Worrying too much about different things 3-Nearly every day   Trouble relaxing 3-Nearly every day   Being so restless that it's hard to sit still 3-Nearly every day   Becoming easily annoyed or irritable 3-Nearly every day   Feeling afraid as if something awful might happen 3-Nearly every day   FRIEDA-7 Total Score 21         WEIGHT  Patient's BMI is Body mass index is 35.86 kg/m². kg/m2. BMI is increasing. Patient understands that this condition increases the patient's risk for chronic conditions.   Wt Readings from Last 3 Encounters:   10/06/22 183 lb 9.6 oz (83.3 kg)   06/01/22 183 lb 9.6 oz (83.3 kg)   04/18/22 179 lb (81.2 kg)       Patient Active Problem List   Diagnosis    Fibromyalgia    Antinuclear factor positive    Hepatic steatosis    Liver lesion    Gastritis    Elevated liver enzymes    Tobacco abuse    Anxiety    Type 2 diabetes mellitus without complication, without long-term current use of insulin (HCC)    Mixed hyperlipidemia    Gastro-esophageal reflux disease without esophagitis    Complete rotator cuff tear or rupture of right shoulder, not specified as traumatic    Nicotine dependence, cigarettes, uncomplicated    Allergic rhinitis due to allergen    Vitamin D deficiency    Pure hypercholesterolemia    Diabetes mellitus ([facility] [address]    Family history of malignant neoplasm of breast    Type 2 diabetes mellitus with diabetic polyneuropathy (HCC)    BMI 36.0-36.9,adult    Class 2 severe obesity due to excess calories with serious comorbidity and body mass index (BMI) of 36.0 to 36.9 in adult (HCC)    Moderate episode of recurrent major depressive disorder (HCC)    PTSD (post-traumatic stress disorder)    Lumbar radiculopathy    Bilateral shoulder region arthritis    Psychophysiological insomnia    Arthritis of left hip      Review of Systems   Constitutional:  Negative for activity change, chills and fever. HENT:  Negative for congestion. Respiratory: Negative. Negative for cough and shortness of breath. Cardiovascular: Negative. Negative for chest pain and palpitations. Gastrointestinal:  Negative for abdominal pain, constipation, diarrhea and nausea. Endocrine: Negative for cold intolerance. Musculoskeletal:  Positive for arthralgias, back pain and myalgias. Negative for gait problem. General myalgias  Left hip pain,  Bilateral shoulder pain   Skin:  Negative for rash. Allergic/Immunologic: Positive for environmental allergies and food allergies. Neurological:  Positive for numbness. Negative for headaches. Psychiatric/Behavioral:  Positive for dysphoric mood and sleep disturbance. Negative for suicidal ideas. The patient is nervous/anxious. Vitals:    10/06/22 1305   BP: 132/86   Pulse: 99   Temp: 97.9 °F (36.6 °C)   TempSrc: Infrared   SpO2: 99%   Weight: 183 lb 9.6 oz (83.3 kg)   Height: 5' (1.524 m)       Estimated body mass index is 35.86 kg/m² as calculated from the following:    Height as of this encounter: 5' (1.524 m). Weight as of this encounter: 183 lb 9.6 oz (83.3 kg). Physical Exam  Vitals and nursing note reviewed. Constitutional:       Appearance: Normal appearance. She is obese. HENT:      Head: Normocephalic. Nose: Nose normal.   Cardiovascular:      Rate and Rhythm: Normal rate and regular rhythm. Pulmonary:      Effort: Pulmonary effort is normal.      Breath sounds: Normal breath sounds. Abdominal:      General: Abdomen is protuberant. Comments: obese   Musculoskeletal:      Left hip: Tenderness, bony tenderness and crepitus present. No deformity.  Decreased range of motion. Skin:     General: Skin is warm and dry. Neurological:      Mental Status: She is alert and oriented to person, place, and time. Psychiatric:         Mood and Affect: Mood is anxious. Speech: Speech is rapid and pressured. Thought Content: Thought content does not include suicidal ideation. ASSESSMENT/PLAN:  1. Arthritis of left hip  Worsening  Continue current therapy. DISCUSSED and ADVISED TO:  Stay at a healthy weight. Continue exercises/PT  Stretch to help prevent stiffness and to prevent injury before exercise. Gentle forms of yoga help keep joints and muscles flexible. Walk instead of jog, ride a bike, swim, and water exercise. Lift weights as tolerated. strong muscles help reduce stress on joints. Take pain medicines exactly as directed and only as needed. - Jeannine Parks MD, Orthopedic Surgery, [place]    2. Type 2 diabetes mellitus with diabetic polyneuropathy, without long-term current use of insulin (HCC)  Worsening  Increased jardiance dose  Start trulicity  We will continue to monitor Hemoglobin A1c   DISCUSSED and ADVISED TO:  Continue to check Glucose levels at home. Report increased and low levels as discussed. Decrease carbohydrates, sugary drinks, desserts in your diet. Exercise regularly, as tolerated. Try to lose weight.    - POCT glycosylated hemoglobin (Hb A1C); Future  - Microalbumin, Ur; Future  - Dulaglutide 0.75 MG/0.5ML SOPN; Inject 0.75 mg into the skin once a week  Dispense: 4 Adjustable Dose Pre-filled Pen Syringe; Refill: 0  - empagliflozin (JARDIANCE) 25 MG tablet; Take 1 tablet by mouth daily  Dispense: 30 tablet; Refill: 1  - POCT glycosylated hemoglobin (Hb A1C)    3. Mixed hyperlipidemia  Stable  Continue therapy. Advised to decrease the consumption of red meats, fried foods, trans fats, sweets, sugary beverages. Advised to increase fish, vegetables, and fruits consumption.    Advised to add fiber or OTC supplements in diet.   Discussed weight loss which will result in improvement of lipids levels. Advised to increase daily physical activities and add regular exercises. 4. Fibromyalgia  Stable  DISCUSSED AND ADVISED TO:  Exercise often. Get a good night's sleep. Make healthy changes to diet. Try to reduce stress  Carefully take medications as discussed  Report for worsening symptoms            5. Lumbar radiculopathy  Failure to Improve  Continue current therapy. DISCUSSED AND ADVISED TO:  Use heat packs 15 to 20 mins every 2-3 hours. Do some back stretches as tolerated. Refer to hand out for instructions. Call for worsening, numbness, weakness. 6. Moderate episode of recurrent major depressive disorder (HCC)  Failure to Improve  Continue current therapy. DISCUSSED and ADVISED TO:  Not stopping medication suddenly. See the specialist as discussed. Report for feelings of SI, HI, and hallucinations. Go to the ER for increasing urge to hurt yourself. - hydrOXYzine HCl (ATARAX) 10 MG tablet; Take 1 tablet by mouth 4 times daily as needed for Anxiety  Dispense: 90 tablet; Refill: 1    7. Bilateral shoulder region arthritis  Failure to Improve  Continue current therapy. DISCUSSED and ADVISED TO:  Stay at a healthy weight. Continue exercises/PT  Stretch to help prevent stiffness and to prevent injury before exercise. Gentle forms of yoga help keep joints and muscles flexible. Walk instead of jog, ride a bike, swim, and water exercise. Lift weights as tolerated. strong muscles help reduce stress on joints. Take pain medicines exactly as directed and only as needed. 8. Non-seasonal allergic rhinitis due to other allergic trigger  Failure to Improve  Continue with the same therapy   ADVISED TO:  Avoid known allergens/irritants. Stop smoking or avoid second hand smoke. Stay hydrated. Report for worsening symptoms    - levocetirizine (XYZAL) 5 MG tablet;  Take 1 tablet by mouth nightly  Dispense: 30 tablet; Refill: 2  - hydrOXYzine HCl (ATARAX) 10 MG tablet; Take 1 tablet by mouth 4 times daily as needed for Anxiety  Dispense: 90 tablet; Refill: 1    9. Class 2 severe obesity due to excess calories with serious comorbidity and body mass index (BMI) of 35.0 to 35.9 in adult [facility])  Failure to Improve  BMI stable  DISCUSSED AND ADVISED TO:  Eat a low-fat and low carbohydrates diet. Avoid fried foods especially fast food. Choose healthier options for snacks. Have 5-6 servings of fruits and vegetables per day. Cut down on eating processed food. Add 30 minutes to 1 hour aerobic exercise for 3-4 days a week. This note was completed by using the assistance of a speech-recognition program. However, inadvertent computerized transcription errors may be present. Although every effort was made to ensure accuracy, no guarantees can be provided that every mistake has been identified and corrected by editing   An electronic signature was used to authenticate this note.   --DAV Spencer - CNP on 10/6/2022 at 6:20 PM

## 2022-10-06 ENCOUNTER — OFFICE VISIT (OUTPATIENT)
Dept: FAMILY MEDICINE CLINIC | Age: 47
End: 2022-10-06
Payer: COMMERCIAL

## 2022-10-06 VITALS
TEMPERATURE: 97.9 F | HEIGHT: 60 IN | WEIGHT: 183.6 LBS | SYSTOLIC BLOOD PRESSURE: 132 MMHG | DIASTOLIC BLOOD PRESSURE: 86 MMHG | BODY MASS INDEX: 36.05 KG/M2 | OXYGEN SATURATION: 99 % | HEART RATE: 99 BPM

## 2022-10-06 DIAGNOSIS — J30.89 NON-SEASONAL ALLERGIC RHINITIS DUE TO OTHER ALLERGIC TRIGGER: ICD-10-CM

## 2022-10-06 DIAGNOSIS — F33.1 MODERATE EPISODE OF RECURRENT MAJOR DEPRESSIVE DISORDER (HCC): ICD-10-CM

## 2022-10-06 DIAGNOSIS — M19.011 BILATERAL SHOULDER REGION ARTHRITIS: ICD-10-CM

## 2022-10-06 DIAGNOSIS — E11.42 TYPE 2 DIABETES MELLITUS WITH DIABETIC POLYNEUROPATHY, WITHOUT LONG-TERM CURRENT USE OF INSULIN (HCC): ICD-10-CM

## 2022-10-06 DIAGNOSIS — M19.012 BILATERAL SHOULDER REGION ARTHRITIS: ICD-10-CM

## 2022-10-06 DIAGNOSIS — E66.01 CLASS 2 SEVERE OBESITY DUE TO EXCESS CALORIES WITH SERIOUS COMORBIDITY AND BODY MASS INDEX (BMI) OF 35.0 TO 35.9 IN ADULT (HCC): ICD-10-CM

## 2022-10-06 DIAGNOSIS — E78.2 MIXED HYPERLIPIDEMIA: ICD-10-CM

## 2022-10-06 DIAGNOSIS — M79.7 FIBROMYALGIA: ICD-10-CM

## 2022-10-06 DIAGNOSIS — M16.12 ARTHRITIS OF LEFT HIP: Primary | ICD-10-CM

## 2022-10-06 DIAGNOSIS — M54.16 LUMBAR RADICULOPATHY: ICD-10-CM

## 2022-10-06 PROBLEM — J30.9 ALLERGIC RHINITIS DUE TO ALLERGEN: Status: ACTIVE | Noted: 2017-10-06

## 2022-10-06 LAB — HBA1C MFR BLD: 9.4 %

## 2022-10-06 PROCEDURE — 4004F PT TOBACCO SCREEN RCVD TLK: CPT | Performed by: FAMILY MEDICINE

## 2022-10-06 PROCEDURE — G8427 DOCREV CUR MEDS BY ELIG CLIN: HCPCS | Performed by: FAMILY MEDICINE

## 2022-10-06 PROCEDURE — G8484 FLU IMMUNIZE NO ADMIN: HCPCS | Performed by: FAMILY MEDICINE

## 2022-10-06 PROCEDURE — 3046F HEMOGLOBIN A1C LEVEL >9.0%: CPT | Performed by: FAMILY MEDICINE

## 2022-10-06 PROCEDURE — G8417 CALC BMI ABV UP PARAM F/U: HCPCS | Performed by: FAMILY MEDICINE

## 2022-10-06 PROCEDURE — 99214 OFFICE O/P EST MOD 30 MIN: CPT | Performed by: FAMILY MEDICINE

## 2022-10-06 PROCEDURE — 2022F DILAT RTA XM EVC RTNOPTHY: CPT | Performed by: FAMILY MEDICINE

## 2022-10-06 PROCEDURE — 83036 HEMOGLOBIN GLYCOSYLATED A1C: CPT | Performed by: FAMILY MEDICINE

## 2022-10-06 RX ORDER — HYDROXYZINE HYDROCHLORIDE 25 MG/1
25 TABLET, FILM COATED ORAL 4 TIMES DAILY PRN
Qty: 90 TABLET | Refills: 1
Start: 2022-10-06 | End: 2022-10-06

## 2022-10-06 RX ORDER — HYDROXYZINE HYDROCHLORIDE 10 MG/1
10 TABLET, FILM COATED ORAL 4 TIMES DAILY PRN
Qty: 90 TABLET | Refills: 1 | Status: SHIPPED | OUTPATIENT
Start: 2022-10-06 | End: 2022-11-05

## 2022-10-06 RX ORDER — LEVOCETIRIZINE DIHYDROCHLORIDE 5 MG/1
5 TABLET, FILM COATED ORAL NIGHTLY
Qty: 30 TABLET | Refills: 2 | Status: SHIPPED | OUTPATIENT
Start: 2022-10-06 | End: 2022-11-05

## 2022-10-06 NOTE — PROGRESS NOTES
Visit Information    Have you changed or started any medications since your last visit including any over-the-counter medicines, vitamins, or herbal medicines? no   Are you having any side effects from any of your medications? -  no  Have you stopped taking any of your medications? Is so, why? -  no    Have you seen any other physician or provider since your last visit? Yes - Records Obtained  Have you had any other diagnostic tests since your last visit? Yes - Records Obtained  Have you been seen in the emergency room and/or had an admission to a hospital since we last saw you? Yes - Records Obtained  Have you had your routine dental cleaning in the past 6 months? no    Have you activated your [employer] account? If not, what are your barriers?  Yes     Patient Care Team:  DAV Cuevas CNP as PCP - General (Family Medicine)  DAV Cuevas CNP as PCP - [facility] Provider    Medical History Review  Past Medical, Family, and Social History reviewed and does contribute to the patient presenting condition    Health Maintenance   Topic Date Due    COVID-19 Vaccine (1) Never done    Pneumococcal 0-64 years Vaccine (1 - PCV) Never done    Diabetic foot exam  Never done    Diabetic retinal exam  Never done    DTaP/Tdap/Td vaccine (1 - Tdap) Never done    Cervical cancer screen  Never done    Breast cancer screen  Never done    Colorectal Cancer Screen  Never done    Diabetic microalbuminuria test  05/18/2022    Lipids  05/18/2022    Flu vaccine (1) Never done    A1C test (Diabetic or Prediabetic)  09/01/2022    Depression Monitoring  06/01/2023    Hepatitis C screen  Completed    HIV screen  Completed    Hepatitis A vaccine  Aged Out    Hepatitis B vaccine  Aged Out    Hib vaccine  Aged Out    Meningococcal (ACWY) vaccine  Aged Out

## 2022-10-06 NOTE — PATIENT INSTRUCTIONS
New Updates for [facility] MyChart/ [employer] ([facility]) DAVION    Thank you for choosing US to give you the best care! [employer] ([facility]) is always trying to think of new ways to help their patients. We are asking all patients to try out the new digital registration that is now available through your [facility] account or the new DAVION, [employer] ([facility]). Via the davion you're now able to update your personal and registration information prior to your upcoming appointment. This will save you time once you arrive at the office to check-in, not to mention your information remains safe!! Many other perks come from signing up for an account, such as:  Requesting refills  Scheduling an appointment  Completing an E-Visit  Sending a message to the office/provider  Having access to your medication list  Paying your bill/copay prior to your appointment  Scheduling your yearly mammogram  Review your test results    If you are not familiar with [facility] or the [employer] ([facility]) DAVION, please ask one of us and we will be happy to answer any questions or help you set-up your account.       Your [facility] office,  Sara

## 2022-10-07 DIAGNOSIS — E78.2 MIXED HYPERLIPIDEMIA: ICD-10-CM

## 2022-10-07 RX ORDER — PRAVASTATIN SODIUM 20 MG
TABLET ORAL
Qty: 90 TABLET | Refills: 1 | Status: SHIPPED | OUTPATIENT
Start: 2022-10-07

## 2022-10-07 NOTE — TELEPHONE ENCOUNTER
Please Approve or Refuse.   Send to Pharmacy per Pt's Request:      Next Visit Date:  2/6/2023   Last Visit Date: 10/6/2022    Hemoglobin A1C (%)   Date Value   10/06/2022 9.4 (H)   06/01/2022 9.3   06/14/2021 8.7             ( goal A1C is < 7)   BP Readings from Last 3 Encounters:   10/06/22 132/86   06/01/22 134/76   04/18/22 125/84          (goal 120/80)  BUN   Date Value Ref Range Status   05/18/2021 9 6 - 20 mg/dL Final     Creatinine   Date Value Ref Range Status   05/18/2021 0.88 0.50 - 0.90 mg/dL Final     Potassium   Date Value Ref Range Status   05/18/2021 4.4 3.7 - 5.3 mmol/L Final

## 2022-10-19 ENCOUNTER — OFFICE VISIT (OUTPATIENT)
Dept: GASTROENTEROLOGY | Age: 47
End: 2022-10-19
Payer: COMMERCIAL

## 2022-10-19 ENCOUNTER — OFFICE VISIT (OUTPATIENT)
Dept: ORTHOPEDIC SURGERY | Age: 47
End: 2022-10-19
Payer: COMMERCIAL

## 2022-10-19 VITALS
DIASTOLIC BLOOD PRESSURE: 83 MMHG | SYSTOLIC BLOOD PRESSURE: 118 MMHG | HEIGHT: 60 IN | WEIGHT: 183 LBS | BODY MASS INDEX: 35.93 KG/M2

## 2022-10-19 VITALS — WEIGHT: 183 LBS | RESPIRATION RATE: 14 BRPM | BODY MASS INDEX: 35.93 KG/M2 | HEIGHT: 60 IN

## 2022-10-19 DIAGNOSIS — K76.0 NAFLD (NONALCOHOLIC FATTY LIVER DISEASE): Primary | ICD-10-CM

## 2022-10-19 DIAGNOSIS — K58.1 IRRITABLE BOWEL SYNDROME WITH CONSTIPATION: ICD-10-CM

## 2022-10-19 DIAGNOSIS — M70.62 TROCHANTERIC BURSITIS OF LEFT HIP: Primary | ICD-10-CM

## 2022-10-19 DIAGNOSIS — K59.00 CONSTIPATION, UNSPECIFIED CONSTIPATION TYPE: ICD-10-CM

## 2022-10-19 DIAGNOSIS — K76.9 LESION OF LIVER: ICD-10-CM

## 2022-10-19 PROCEDURE — G8484 FLU IMMUNIZE NO ADMIN: HCPCS | Performed by: PHYSICIAN ASSISTANT

## 2022-10-19 PROCEDURE — 99214 OFFICE O/P EST MOD 30 MIN: CPT | Performed by: INTERNAL MEDICINE

## 2022-10-19 PROCEDURE — G8427 DOCREV CUR MEDS BY ELIG CLIN: HCPCS | Performed by: PHYSICIAN ASSISTANT

## 2022-10-19 PROCEDURE — 4004F PT TOBACCO SCREEN RCVD TLK: CPT | Performed by: PHYSICIAN ASSISTANT

## 2022-10-19 PROCEDURE — 99204 OFFICE O/P NEW MOD 45 MIN: CPT | Performed by: PHYSICIAN ASSISTANT

## 2022-10-19 PROCEDURE — G8417 CALC BMI ABV UP PARAM F/U: HCPCS | Performed by: PHYSICIAN ASSISTANT

## 2022-10-19 RX ORDER — METHYLPREDNISOLONE 4 MG/1
TABLET ORAL
Qty: 1 KIT | Refills: 0 | Status: SHIPPED | OUTPATIENT
Start: 2022-10-19 | End: 2022-10-25

## 2022-10-19 ASSESSMENT — ENCOUNTER SYMPTOMS
ABDOMINAL PAIN: 1
ABDOMINAL DISTENTION: 1
RESPIRATORY NEGATIVE: 1
TROUBLE SWALLOWING: 1
ALLERGIC/IMMUNOLOGIC NEGATIVE: 1
CONSTIPATION: 1
NAUSEA: 1

## 2022-10-19 NOTE — PROGRESS NOTES
[address] [facility], [address] [facility], [address] [place] [address], [address] [facility]           Dept Phone: [phone]           Dept Fax:  [phone] [facility]           Shobha Alfred          Dept Phone: [phone]           Dept Fax:  [phone]      Chief Compliant:  Chief Complaint   Patient presents with    Hip Pain     Left        History of Present Illness: This is a 56 y.o. female who presents to the clinic today for evaluation of had concerns including Hip Pain (Left). Ms. Lyndsay Garcia is a 50-year-old female who presents for evaluation of acute exacerbation of chronic left hip pain. Patient reports she had an injury approximately 20 years ago to the left leg/left hip in which she was hit with a sledgehammer accidentally on a construction site. She has had pain intermittently since that time over the lateral left hip however patient was asked [facility] and she slipped on rocks while [place] fell landed directly on her buttock since then has had increased left hip pain. Patient states over the last month pain in the left hip has improved minimally. She localizes pain most severely to the left lateral aspect of the hip with occasional radiation to the mid lateral thigh. She does note some left buttock pain as well associated with this. She denies any radiation of pain past the knee no numbness or tingling. Of note patient with history of chronic low back pain which was also exacerbated by this fall she states she does have some occasional tailbone since the fall but this is much improved compared to the left hip.        Past History:    Current Outpatient Medications:     methylPREDNISolone (MEDROL DOSEPACK) 4 MG tablet, Take by mouth., Disp: 1 kit, Rfl: 0    pravastatin (PRAVACHOL) 20 MG tablet, TAKE 1 TABLET BY MOUTH EVERY DAY, Disp: 90 tablet, Rfl: 1    Dulaglutide 0.75 MG/0.5ML SOPN, Inject 0.75 mg into the skin once a week, Disp: 4 Adjustable Dose Pre-filled Pen Syringe, Rfl: 0    empagliflozin (JARDIANCE) 25 MG tablet, Take 1 tablet by mouth daily, Disp: 30 tablet, Rfl: 1    levocetirizine (XYZAL) 5 MG tablet, Take 1 tablet by mouth nightly, Disp: 30 tablet, Rfl: 2    hydrOXYzine HCl (ATARAX) 10 MG tablet, Take 1 tablet by mouth 4 times daily as needed for Anxiety, Disp: 90 tablet, Rfl: 1    lidocaine (LIDODERM) 5 %, Place 1 patch onto the skin daily as needed, Disp: , Rfl:     naproxen (NAPROSYN) 500 MG tablet, Take 1 tablet by mouth 2 times daily (with meals), Disp: 180 tablet, Rfl: 1    blood glucose monitor strips, Test 2-3 times a day & as needed for symptoms of irregular blood glucose. BRAND OF CHO[employer] INSURANCE ALLOWS., Disp: 100 strip, Rfl: 11    Blood Glucose Monitoring Suppl (BLOOD GLUCOSE MONITOR SYSTEM) w/Device KIT, Use as directed. Check glucose levels tid and as needed, Disp: 1 kit, Rfl: 0    Alcohol Swabs (ALCOHOL PREP) PADS, Use as directed, Disp: 100 each, Rfl: 11    Lancets MISC, 1 each by Does not apply route 2 times daily, Disp: 300 each, Rfl: 11    metFORMIN (GLUCOPHAGE-XR) 500 mg extended release tablet, Take 2 tablets by mouth daily (with breakfast), Disp: 180 tablet, Rfl: 2    glipiZIDE (GLUCOTROL) 5 mg tablet, Take 0.5 tablets by mouth daily, Disp: 30 tablet, Rfl: 3    chlorhexidine (PERIDEX) 0.12 % solution, TAKE 15 MLS BY MOUTH 2 TIMES DAILY, Disp: 473 mL, Rfl: 2    fluticasone (FLONASE) 50 MCG/ACT nasal spray, PLACE 1 SPRAY IN EACH NOSTRIL 2 TIMES DAILY, Disp: 16 g, Rfl: 2    silver sulfADIAZINE (SSD) 1 % cream, APPLY TOPICALLY DAILY. , Disp: 400 g, Rfl: 2    omeprazole (PRILOSEC) 20 MG delayed release capsule, TAKE 1 CAPSULE BY MOUTH ONCE DAILY AS NEEDED, Disp: 90 capsule, Rfl: 2    ketotifen (ZADITOR) 0.025 % ophthalmic solution, PLACE 1 DROP INTO BOTH EYES 2 TIMES DAILY, Disp: 5 mL, Rfl: 0    Cholecalciferol (VITAMIN D3) 1.25 MG (83237 UT) CAPS, Take by mouth, Disp: , Rfl:     nystatin (MYCOSTATIN) 199374 UNIT/GM powder, Apply topically 4 times daily. , Disp: 1 Bottle, Rfl: 5    Handicap Placard MISC, by Does not apply route Expires December 2025, Disp: 1 each, Rfl: 0    cyclobenzaprine (FLEXERIL) 10 MG tablet, Take 1 tablet by mouth 3 times daily as needed, Disp: , Rfl:   Allergies   Allergen Reactions    Morphine Swelling    Penicillins Swelling    Amoxicillin     Hydrocodone-Acetaminophen     Oxycodone-Acetaminophen     Percocet [Oxycodone-Acetaminophen]     Trazodone Hcl Other (See Comments)    Ultram [Tramadol]     Vicodin [Hydrocodone-Acetaminophen]      Social History     Socioeconomic History    Marital status:      Spouse name: Not on file    Number of children: Not on file    Years of education: Not on file    Highest education level: Not on file   Occupational History    Not on file   Tobacco Use    Smoking status: Every Day     Packs/day: 0.50     Years: 27.00     Pack years: 13.50     Types: Cigarettes    Smokeless tobacco: Never   Substance and Sexual Activity    Alcohol use: No    Drug use: No    Sexual activity: Not on file   Other Topics Concern    Not on file   Social History Narrative    Not on file     Social Determinants of Health     Financial Resource Strain: Not on file   Food Insecurity: Not on file   Transportation Needs: Not on file   Physical Activity: Not on file   Stress: Not on file   Social Connections: Not on file   Intimate Partner Violence: Not on file   Housing Stability: Not on file     Past Medical History:   Diagnosis Date    Abdominal pain     Antinuclear factor positive     Anxiety 1/16/2020    Arthritis     osteoarthritis    Autoimmune disease ([facility]     unknown exactly, being worked up    Class 2 severe obesity due to excess calories with serious comorbidity and body mass index (BMI) of 36.0 to 36.9 in adult [facility]) 7/27/2020    Depression     Diabetes mellitus ([facility] Edema     Environmental allergies     Epigastric pain     Fibromyalgia     Gastritis     Gastro-esophageal reflux disease without esophagitis 10/6/2017    Hepatic steatosis     Liver lesion     Lumbago     Other seasonal allergic rhinitis 10/6/2017    Plantar fasciitis     Primary fibromyalgia syndrome     PTSD (post-traumatic stress disorder)     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HCC)     Sjogren's disease ([facility]     Type 2 diabetes mellitus with diabetic polyneuropathy [facility] 5/24/2018     Past Surgical History:   Procedure Laterality Date    HYSTERECTOMY (CERVIX STATUS UNKNOWN)      partial hysterectomy    PAROTIDECTOMY Right     TONSILLECTOMY      UPPER GASTROINTESTINAL ENDOSCOPY  01/26/2016    chronic inactive gastritis    VARICOSE VEIN SURGERY Right      Family History   Problem Relation Age of Onset    Breast Cancer Mother         age 27    High Blood Pressure Mother     Diabetes Mother     High Blood Pressure Maternal Grandfather     Diabetes Maternal Grandfather         Review of Systems   Constitutional: Negative for fever, chills, sweats. Eyes: Negative for changes in vision, or pain. HENT: Negative for ear ache, epistaxis, or sore throat. Respiratory/Cardio: Negative for Chest pain, palpitations, SOB, or cough. Gastrointestinal: Negative for abdominal pain, N/V/D. Genitourinary: Negative for dysuria, frequency, urgency, or hematuria. Neurological: Negative for headache, numbness, or weakness. Integumentary: Negative for rash, itching, laceration, or abrasion. Musculoskeletal: Positive for Hip Pain (Left)       Physical Exam:  Constitutional: Patient is oriented to person, place, and time. Patient appears well-developed and well nourished. HENT: Negative otherwise noted  Head: Normocephalic and Atraumatic  Nose: Normal  Eyes: Conjunctivae and EOM are normal  Neck: Normal range of motion Neck supple. Respiratory/Cardio: Effort normal. No respiratory distress.   Musculoskeletal:    left Hip    Tenderness: Severe tenderness over the left greater trochanter. Mild tenderness over the proximal IT band. No tenderness to the anterior posterior hip       Range of Motion:      Extension: 10     Flexion: 120     Internal Rotation: 30     External Rotation: 40     Abduction: 40     Adduction:  30       Muscle Strength      Abduction: 5/5     Adduction: 5/5     Flexion: 5/5         Gait: Antalgic   Kailee Test: Negative   Irvin Test: Positive       Neurological: Patient is alert and oriented to person, place, and time. Normal strenght. No sensory deficit. Skin: Skin is warm and dry  Psychiatric: Behavior is normal. Thought content normal.  Nursing note and vitals reviewed. Labs and Imaging:        No new x-rays taken today however images from 9/4/2022 AP pelvis and AP and lateral views of the left hip demonstrate normal anatomic alignment. Patient with out evidence of significant degenerative changes, subluxation, dislocation or acute fracture. 3 views of the lumbar spine from the same day are available for independent review demonstrate maintenance of disc height without evidence of spondylitic instability, acute fracture or other acute osseous abnormality. No orders of the defined types were placed in this encounter. Assessment and Plan:  1. Trochanteric bursitis of left hip          PLAN:  Markus Brown is a 56 y.o. old female with left hip pain that is consistent with left trochanteric bursitis  We discussed treatment options available to her, including nonoperative and operative intervention. At this time I believe she will benefit from conservative management. Consequently, an extensive home exercise program was provided. We also discussed use the possibility of a cortisone injection versus short course of oral steroid burst and at this time she has elected to proceed with Medrol Dosepak which electronically sent to patient pharmacy.     We will have patient follow-up in 4 weeks for reevaluation however patient may call or consider for any questions or concerns. Should she continue to be significantly painful would recommend corticosteroid injection which she was agreeable at that this time should she not improve with HEP or medication. Electronically signed by JUAN J Vargas on 10/19/22 at 11:55 AM EDT        Please note that this chart was generated using voice recognition Dragon dictation software. Although every effort was made to ensure the accuracy of this automated transcription, some errors in transcription may have occurred.

## 2022-10-19 NOTE — PROGRESS NOTES
GI OFFICE FOLLOW UP    INTERVAL HISTORY:   No referring provider defined for this encounter. Chief Complaint   Patient presents with    Other     Pt here for fatty liver, gastritis    Nausea     Pt states she feels sick every time she eats        1. NAFLD (nonalcoholic fatty liver disease)    2. Constipation, unspecified constipation type    3. Irritable bowel syndrome with constipation              HISTORY OF PRESENT ILLNESS:   Patient seen for follow-up of altered bowel movements, liver disease. Patient was followed by Apurva Mendez in the past.  Last time he was seen by him was in April 2022. She is known to have nonalcoholic fatty liver disease. Patient is on vitamin D therapy and it appears that she is tolerating well. However hemoglobin A1c is above 9.3. In the setting, I am not sure whether vitamin D therapy helps for this patient. This was explained and may discontinue this medicine. This is informed to the patient. She has altered bowel habits. At present she has significant constipation and has been taking Colace. No hematochezia. No diarrhea. Good appetite. No dysphagia or dyspepsia. Past Medical,Family, and Social History reviewed and does contribute to the patient presenting condition. Patient's PMH/PSH,SH,PSYCH Hx, MEDs, ALLERGIES, and ROS were all reviewed and updated in the appropriate sections.  Yes      PAST MEDICAL HISTORY:  Past Medical History:   Diagnosis Date    Abdominal pain     Antinuclear factor positive     Anxiety 1/16/2020    Arthritis     osteoarthritis    Autoimmune disease ([facility]     unknown exactly, being worked up    Class 2 severe obesity due to excess calories with serious comorbidity and body mass index (BMI) of 36.0 to 36.9 in adult [facility]) 7/27/2020    Depression     Diabetes mellitus ([facility]     Edema     Environmental allergies Epigastric pain     Fibromyalgia     Gastritis     Gastro-esophageal reflux disease without esophagitis 10/6/2017    Hepatic steatosis     Liver lesion     Lumbago     Other seasonal allergic rhinitis 10/6/2017    Plantar fasciitis     Primary fibromyalgia syndrome     PTSD (post-traumatic stress disorder)     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HCC)     Sjogren's disease ([facility]     Type 2 diabetes mellitus with diabetic polyneuropathy [facility] 5/24/2018       Past Surgical History:   Procedure Laterality Date    HYSTERECTOMY (CERVIX STATUS UNKNOWN)      partial hysterectomy    PAROTIDECTOMY Right     TONSILLECTOMY      UPPER GASTROINTESTINAL ENDOSCOPY  01/26/2016    chronic inactive gastritis    VARICOSE VEIN SURGERY Right        CURRENT MEDICATIONS:    Current Outpatient Medications:     methylPREDNISolone (MEDROL DOSEPACK) 4 MG tablet, Take by mouth., Disp: 1 kit, Rfl: 0    pravastatin (PRAVACHOL) 20 MG tablet, TAKE 1 TABLET BY MOUTH EVERY DAY, Disp: 90 tablet, Rfl: 1    Dulaglutide 0.75 MG/0.5ML SOPN, Inject 0.75 mg into the skin once a week, Disp: 4 Adjustable Dose Pre-filled Pen Syringe, Rfl: 0    empagliflozin (JARDIANCE) 25 MG tablet, Take 1 tablet by mouth daily, Disp: 30 tablet, Rfl: 1    levocetirizine (XYZAL) 5 MG tablet, Take 1 tablet by mouth nightly, Disp: 30 tablet, Rfl: 2    hydrOXYzine HCl (ATARAX) 10 MG tablet, Take 1 tablet by mouth 4 times daily as needed for Anxiety, Disp: 90 tablet, Rfl: 1    lidocaine (LIDODERM) 5 %, Place 1 patch onto the skin daily as needed, Disp: , Rfl:     naproxen (NAPROSYN) 500 MG tablet, Take 1 tablet by mouth 2 times daily (with meals), Disp: 180 tablet, Rfl: 1    blood glucose monitor strips, Test 2-3 times a day & as needed for symptoms of irregular blood glucose. BRAND OF CHO[employer] INSURANCE ALLOWS., Disp: 100 strip, Rfl: 11    Blood Glucose Monitoring Suppl (BLOOD GLUCOSE MONITOR SYSTEM) w/Device KIT, Use as directed.  Check glucose levels tid and as needed, Disp: 1 kit, Rfl: 0 Alcohol Swabs (ALCOHOL PREP) PADS, Use as directed, Disp: 100 each, Rfl: 11    Lancets MISC, 1 each by Does not apply route 2 times daily, Disp: 300 each, Rfl: 11    metFORMIN (GLUCOPHAGE-XR) 500 mg extended release tablet, Take 2 tablets by mouth daily (with breakfast), Disp: 180 tablet, Rfl: 2    glipiZIDE (GLUCOTROL) 5 mg tablet, Take 0.5 tablets by mouth daily, Disp: 30 tablet, Rfl: 3    chlorhexidine (PERIDEX) 0.12 % solution, TAKE 15 MLS BY MOUTH 2 TIMES DAILY, Disp: 473 mL, Rfl: 2    fluticasone (FLONASE) 50 MCG/ACT nasal spray, PLACE 1 SPRAY IN EACH NOSTRIL 2 TIMES DAILY, Disp: 16 g, Rfl: 2    silver sulfADIAZINE (SSD) 1 % cream, APPLY TOPICALLY DAILY. , Disp: 400 g, Rfl: 2    omeprazole (PRILOSEC) 20 MG delayed release capsule, TAKE 1 CAPSULE BY MOUTH ONCE DAILY AS NEEDED, Disp: 90 capsule, Rfl: 2    ketotifen (ZADITOR) 0.025 % ophthalmic solution, PLACE 1 DROP INTO BOTH EYES 2 TIMES DAILY, Disp: 5 mL, Rfl: 0    Cholecalciferol (VITAMIN D3) 1.25 MG (97574 UT) CAPS, Take by mouth, Disp: , Rfl:     nystatin (MYCOSTATIN) 779039 UNIT/GM powder, Apply topically 4 times daily. , Disp: 1 Bottle, Rfl: 5    Handicap Placard MISC, by Does not apply route Expires December 2025, Disp: 1 each, Rfl: 0    cyclobenzaprine (FLEXERIL) 10 MG tablet, Take 1 tablet by mouth 3 times daily as needed, Disp: , Rfl:     ALLERGIES:   Allergies   Allergen Reactions    Morphine Swelling    Penicillins Swelling    Amoxicillin     Hydrocodone-Acetaminophen     Oxycodone-Acetaminophen     Percocet [Oxycodone-Acetaminophen]     Trazodone Hcl Other (See Comments)    Ultram [Tramadol]     Vicodin [Hydrocodone-Acetaminophen]        FAMILY HISTORY:       Problem Relation Age of Onset    Breast Cancer Mother         age 27    High Blood Pressure Mother     Diabetes Mother     High Blood Pressure Maternal Grandfather     Diabetes Maternal Grandfather          SOCIAL HISTORY:   Social History     Socioeconomic History    Marital status:  Spouse name: Not on file    Number of children: Not on file    Years of education: Not on file    Highest education level: Not on file   Occupational History    Not on file   Tobacco Use    Smoking status: Every Day     Packs/day: 0.50     Years: 27.00     Pack years: 13.50     Types: Cigarettes    Smokeless tobacco: Never   Substance and Sexual Activity    Alcohol use: No    Drug use: No    Sexual activity: Not on file   Other Topics Concern    Not on file   Social History Narrative    Not on file     Social Determinants of Health     Financial Resource Strain: Not on file   Food Insecurity: Not on file   Transportation Needs: Not on file   Physical Activity: Not on file   Stress: Not on file   Social Connections: Not on file   Intimate Partner Violence: Not on file   Housing Stability: Not on file         REVIEW OF SYSTEMS:         Review of Systems   Constitutional: Negative. HENT:  Positive for trouble swallowing. Eyes:  Positive for visual disturbance (glasses). Respiratory: Negative. Cardiovascular: Negative. Gastrointestinal:  Positive for abdominal distention, abdominal pain, constipation and nausea. Endocrine: Negative. Genitourinary: Negative. Musculoskeletal: Negative. Skin: Negative. Allergic/Immunologic: Negative. Neurological: Negative. Hematological: Negative. Psychiatric/Behavioral: Negative. PHYSICAL EXAMINATION:     Vital signs reviewed per the nursing documentation. /83   Ht 5' (1.524 m)   Wt 183 lb (83 kg)   BMI 35.74 kg/m²   Body mass index is 35.74 kg/m². Physical Exam  Constitutional:       Comments: Patient is overweight. Abdominal:      Comments: Obese abdomen.          LABORATORY DATA: Reviewed  Lab Results   Component Value Date    WBC 7.7 05/18/2021    HGB 14.5 05/18/2021    HCT 43.4 05/18/2021    MCV 88.5 05/18/2021     05/18/2021     05/18/2021    K 4.4 05/18/2021     05/18/2021    CO2 27 05/18/2021    BUN 9 05/18/2021    CREATININE 0.88 05/18/2021    LABALBU 4.2 05/18/2021    BILITOT 0.47 05/18/2021    ALKPHOS 103 05/18/2021    AST 31 05/18/2021    ALT 51 (H) 05/18/2021    INR 1.1 01/28/2019         Lab Results   Component Value Date    RBC 4.91 05/18/2021    HGB 14.5 05/18/2021    MCV 88.5 05/18/2021    MCH 29.6 05/18/2021    MCHC 33.5 05/18/2021    RDW 13.8 05/18/2021    MPV 10.0 05/18/2021    BASOPCT 1 05/18/2021    LYMPHSABS 2.50 05/18/2021    MONOSABS 0.40 05/18/2021    NEUTROABS 4.40 05/18/2021    EOSABS 0.30 05/18/2021    BASOSABS 0.10 05/18/2021         DIAGNOSTIC TESTING:     No results found. Assessment  1. NAFLD (nonalcoholic fatty liver disease)    2. Constipation, unspecified constipation type    3. Irritable bowel syndrome with constipation        Plan  Patient is explained regarding fatty liver and management. Strongly encouraged calorie restriction, active lifestyle, daily exercise and to lose weight. Long-term consequences of fatty liver disease including cirrhosis of the liver. Also discussed regarding uncontrolled diabetes. Explained regarding importance of controlling blood sugars and the effect of uncontrolled diabetes and fatty liver disease. Discussed regarding possible constipation predominant IBS and management. Brochures given regarding medical management of constipation. She will need CT of the liver to evaluate questionable abnormality seen on previous CT. This is arranged    Advised to see me in the next 6 months. Thank you for allowing me to participate in the care of Ms. Pond. For any further questions please do not hesitate to contact me. I have reviewed and agree with the ROS entered by the MA/LPN. Note is dictated utilizing voice recognition software. Unfortunately this leads to occasional typographical errors.  Please contact our office if you have any questions        Dasha Kramer MD,FACP, [facility]  Board Certified in Gastroenterology and Internal [address] [place] [address] Gastroenterology  Office #: [phone]

## 2022-11-28 ENCOUNTER — TELEMEDICINE (OUTPATIENT)
Dept: PRIMARY CARE CLINIC | Age: 47
End: 2022-11-28
Payer: COMMERCIAL

## 2022-11-28 DIAGNOSIS — F17.200 CURRENT EVERY DAY SMOKER: ICD-10-CM

## 2022-11-28 DIAGNOSIS — R05.1 ACUTE COUGH: Primary | ICD-10-CM

## 2022-11-28 PROCEDURE — 99203 OFFICE O/P NEW LOW 30 MIN: CPT | Performed by: NURSE PRACTITIONER

## 2022-11-28 PROCEDURE — G8427 DOCREV CUR MEDS BY ELIG CLIN: HCPCS | Performed by: NURSE PRACTITIONER

## 2022-11-28 RX ORDER — BENZONATATE 200 MG/1
200 CAPSULE ORAL 3 TIMES DAILY PRN
Qty: 30 CAPSULE | Refills: 0 | Status: SHIPPED | OUTPATIENT
Start: 2022-11-28 | End: 2022-12-05

## 2022-11-28 RX ORDER — ALBUTEROL SULFATE 90 UG/1
2 AEROSOL, METERED RESPIRATORY (INHALATION) 4 TIMES DAILY PRN
Qty: 18 G | Refills: 5 | Status: SHIPPED | OUTPATIENT
Start: 2022-11-28

## 2022-11-28 SDOH — ECONOMIC STABILITY: FOOD INSECURITY: WITHIN THE PAST 12 MONTHS, THE FOOD YOU BOUGHT JUST DIDN'T LAST AND YOU DIDN'T HAVE MONEY TO GET MORE.: NEVER TRUE

## 2022-11-28 SDOH — ECONOMIC STABILITY: FOOD INSECURITY: WITHIN THE PAST 12 MONTHS, YOU WORRIED THAT YOUR FOOD WOULD RUN OUT BEFORE YOU GOT MONEY TO BUY MORE.: NEVER TRUE

## 2022-11-28 ASSESSMENT — ENCOUNTER SYMPTOMS
COLOR CHANGE: 0
NAUSEA: 0
ABDOMINAL PAIN: 0
SHORTNESS OF BREATH: 0
SORE THROAT: 0
VOMITING: 0
DIARRHEA: 0
COUGH: 1
RHINORRHEA: 0
CHEST TIGHTNESS: 0

## 2022-11-28 ASSESSMENT — PATIENT HEALTH QUESTIONNAIRE - PHQ9
10. IF YOU CHECKED OFF ANY PROBLEMS, HOW DIFFICULT HAVE THESE PROBLEMS MADE IT FOR YOU TO DO YOUR WORK, TAKE CARE OF THINGS AT HOME, OR GET ALONG WITH OTHER PEOPLE: 0
SUM OF ALL RESPONSES TO PHQ QUESTIONS 1-9: 0
3. TROUBLE FALLING OR STAYING ASLEEP: 0
7. TROUBLE CONCENTRATING ON THINGS, SUCH AS READING THE NEWSPAPER OR WATCHING TELEVISION: 0
8. MOVING OR SPEAKING SO SLOWLY THAT OTHER PEOPLE COULD HAVE NOTICED. OR THE OPPOSITE, BEING SO FIGETY OR RESTLESS THAT YOU HAVE BEEN MOVING AROUND A LOT MORE THAN USUAL: 0
4. FEELING TIRED OR HAVING LITTLE ENERGY: 0
6. FEELING BAD ABOUT YOURSELF - OR THAT YOU ARE A FAILURE OR HAVE LET YOURSELF OR YOUR FAMILY DOWN: 0
SUM OF ALL RESPONSES TO PHQ QUESTIONS 1-9: 0
1. LITTLE INTEREST OR PLEASURE IN DOING THINGS: 0
SUM OF ALL RESPONSES TO PHQ QUESTIONS 1-9: 0
5. POOR APPETITE OR OVEREATING: 0
SUM OF ALL RESPONSES TO PHQ9 QUESTIONS 1 & 2: 0
2. FEELING DOWN, DEPRESSED OR HOPELESS: 0
9. THOUGHTS THAT YOU WOULD BE BETTER OFF DEAD, OR OF HURTING YOURSELF: 0
SUM OF ALL RESPONSES TO PHQ QUESTIONS 1-9: 0

## 2022-11-28 ASSESSMENT — SOCIAL DETERMINANTS OF HEALTH (SDOH): HOW HARD IS IT FOR YOU TO PAY FOR THE VERY BASICS LIKE FOOD, HOUSING, MEDICAL CARE, AND HEATING?: NOT HARD AT ALL

## 2022-11-28 NOTE — PROGRESS NOTES
2022    TELEHEALTH EVALUATION -- Audio/Visual (During [facility]-92 public health emergency)    HPI:    Tori Haynes (:  1975) has requested an audio/video evaluation for the following concern(s):    Here for urgent visit  Has new patient appointment scheduled for 12/15 with Dr. Trevin Sabillon, thought I was in the same office as him so was scheduled with me   She is not establishing care with me today, will treat as urgent visit   Has c/o cough lingering for several weeks  Had burst pipes at the home, does have some sewage gas smell and she thinks symptoms may be related to this  Is newly established with Cancer agency for assistance,  newly diagnosed with cancer  They have someone coming to fix the pipes this week  Denies fever or chills, slight sore throat from coughing but no other associated symptoms   Has many allergies including her 3 dogs, she is taking antihistamine daily   Is daily smoker, about 0.5-1 PPD, denies cough or SOB prior to this    Review of Systems   Constitutional:  Negative for activity change, fatigue and fever. HENT:  Negative for congestion, rhinorrhea and sore throat. Eyes:  Negative for visual disturbance. Respiratory:  Positive for cough. Negative for chest tightness and shortness of breath. Cardiovascular:  Negative for chest pain and palpitations. Gastrointestinal:  Negative for abdominal pain, diarrhea, nausea and vomiting. Endocrine: Negative for polydipsia. Genitourinary:  Negative for difficulty urinating. Musculoskeletal:  Negative for arthralgias and myalgias. Skin:  Negative for color change. Neurological:  Negative for weakness and headaches. Psychiatric/Behavioral:  Negative for behavioral problems. The patient is not nervous/anxious. All other systems reviewed and are negative. Prior to Visit Medications    Medication Sig Taking?  Authorizing Provider   benzonatate (TESSALON) 200 MG capsule Take 1 capsule by mouth 3 times daily as needed for Cough Yes DAV Arechiga CNP   albuterol sulfate HFA (VENTOLIN HFA) 108 (90 Base) MCG/ACT inhaler Inhale 2 puffs into the lungs 4 times daily as needed for Wheezing Yes DAV Arechiga CNP   pravastatin (PRAVACHOL) 20 MG tablet TAKE 1 TABLET BY MOUTH EVERY DAY Yes DAV Cuevas CNP   lidocaine (LIDODERM) 5 % Place 1 patch onto the skin daily as needed Yes Historical Provider, MD   naproxen (NAPROSYN) 500 MG tablet Take 1 tablet by mouth 2 times daily (with meals) Yes DAV Cuevas CNP   blood glucose monitor strips Test 2-3 times a day & as needed for symptoms of irregular blood glucose. BRAND OF CHOICE INSURANCE ALLOWS. Yes DAV Cuevas CNP   Blood Glucose Monitoring Suppl (BLOOD GLUCOSE MONITOR SYSTEM) w/Device KIT Use as directed. Check glucose levels tid and as needed Yes DAV Cuevas CNP   Alcohol Swabs (ALCOHOL PREP) PADS Use as directed Yes DAV Cuevas CNP   Lancets MISC 1 each by Does not apply route 2 times daily Yes DAV Cuevas CNP   metFORMIN (GLUCOPHAGE-XR) 500 mg extended release tablet Take 2 tablets by mouth daily (with breakfast) Yes DAV Cuevas CNP   glipiZIDE (GLUCOTROL) 5 mg tablet Take 0.5 tablets by mouth daily Yes DAV Cuevas CNP   chlorhexidine (PERIDEX) 0.12 % solution TAKE 15 MLS BY MOUTH 2 TIMES DAILY Yes DAV Cuevas CNP   fluticasone (FLONASE) 50 MCG/ACT nasal spray PLACE 1 SPRAY IN EACH NOSTRIL 2 TIMES DAILY Yes DAV Cuevas CNP   silver sulfADIAZINE (SSD) 1 % cream APPLY TOPICALLY DAILY.  Yes DAV Cuevas CNP   omeprazole (PRILOSEC) 20 MG delayed release capsule TAKE 1 CAPSULE BY MOUTH ONCE DAILY AS NEEDED Yes DAV Cuevas CNP   ketotifen (ZADITOR) 0.025 % ophthalmic solution PLACE 1 DROP INTO BOTH EYES 2 TIMES DAILY Yes Chalino Rodriguez, APRN - CNP   Cholecalciferol (VITAMIN D3) 1.25 MG (44307 UT) CAPS Take by mouth Yes Historical Provider, MD   nystatin (MYCOSTATIN) 169333 UNIT/GM powder Apply topically 4 times daily. Yes DAV Cuevas CNP   Handicap Placard MISC by Does not apply route Expires December 2025 Yes DAV Cuevas CNP   cyclobenzaprine (FLEXERIL) 10 MG tablet Take 1 tablet by mouth 3 times daily as needed Yes Historical Provider, MD   empagliflozin (JARDIANCE) 25 MG tablet Take 1 tablet by mouth daily  Patient not taking: Reported on 11/28/2022  DAV Cuevas CNP   traZODone (DESYREL) 100 MG tablet trazodone 100 mg tablet  Historical Provider, MD       Social History     Tobacco Use    Smoking status: Every Day     Packs/day: 0.50     Years: 27.00     Pack years: 13.50     Types: Cigarettes    Smokeless tobacco: Never   Substance Use Topics    Alcohol use: No    Drug use: No            PHYSICAL EXAMINATION:  [ INSTRUCTIONS:  \"[x]\" Indicates a positive item  \"[]\" Indicates a negative item  -- DELETE ALL ITEMS NOT EXAMINED]  Vital Signs: (As obtained by patient/caregiver or practitioner observation)    Blood pressure-  Heart rate-    Respiratory rate-    Temperature-  Pulse oximetry-     Constitutional: [x] Appears well-developed and well-nourished [x] No apparent distress      [] Abnormal- missing teeth, hoarse voice  Mental status  [x] Alert and awake  [x] Oriented to person/place/time [x]Able to follow commands      Eyes:  EOM    []  Normal  [] Abnormal-  Sclera  []  Normal  [] Abnormal -         Discharge []  None visible  [] Abnormal -    HENT:   [x] Normocephalic, atraumatic.   [] Abnormal   [] Mouth/Throat: Mucous membranes are moist.     External Ears [x] Normal  [] Abnormal-     Neck: [x] No visualized mass     Pulmonary/Chest: [x] Respiratory effort normal.  [] No visualized signs of difficulty breathing or respiratory distress        [] Abnormal-      Musculoskeletal:   [x] Normal gait with no signs of ataxia         [] Normal range of motion of neck

## 2022-12-15 ENCOUNTER — OFFICE VISIT (OUTPATIENT)
Dept: INTERNAL MEDICINE CLINIC | Age: 47
End: 2022-12-15
Payer: COMMERCIAL

## 2022-12-15 VITALS — BODY MASS INDEX: 34.37 KG/M2 | SYSTOLIC BLOOD PRESSURE: 136 MMHG | DIASTOLIC BLOOD PRESSURE: 82 MMHG | WEIGHT: 176 LBS

## 2022-12-15 DIAGNOSIS — E55.9 VITAMIN D DEFICIENCY: ICD-10-CM

## 2022-12-15 DIAGNOSIS — Z12.11 SCREEN FOR COLON CANCER: ICD-10-CM

## 2022-12-15 DIAGNOSIS — E78.2 MIXED HYPERLIPIDEMIA: ICD-10-CM

## 2022-12-15 DIAGNOSIS — E66.01 CLASS 2 SEVERE OBESITY DUE TO EXCESS CALORIES WITH SERIOUS COMORBIDITY AND BODY MASS INDEX (BMI) OF 36.0 TO 36.9 IN ADULT (HCC): ICD-10-CM

## 2022-12-15 DIAGNOSIS — E11.42 TYPE 2 DIABETES MELLITUS WITH DIABETIC POLYNEUROPATHY, WITHOUT LONG-TERM CURRENT USE OF INSULIN (HCC): ICD-10-CM

## 2022-12-15 DIAGNOSIS — Z12.31 ENCOUNTER FOR SCREENING MAMMOGRAM FOR MALIGNANT NEOPLASM OF BREAST: ICD-10-CM

## 2022-12-15 DIAGNOSIS — M79.7 FIBROMYALGIA: ICD-10-CM

## 2022-12-15 DIAGNOSIS — E11.9 TYPE 2 DIABETES MELLITUS WITHOUT COMPLICATION, WITHOUT LONG-TERM CURRENT USE OF INSULIN (HCC): Primary | ICD-10-CM

## 2022-12-15 DIAGNOSIS — F33.1 MODERATE EPISODE OF RECURRENT MAJOR DEPRESSIVE DISORDER (HCC): ICD-10-CM

## 2022-12-15 PROCEDURE — 99204 OFFICE O/P NEW MOD 45 MIN: CPT | Performed by: INTERNAL MEDICINE

## 2022-12-15 PROCEDURE — G8417 CALC BMI ABV UP PARAM F/U: HCPCS | Performed by: INTERNAL MEDICINE

## 2022-12-15 PROCEDURE — G8427 DOCREV CUR MEDS BY ELIG CLIN: HCPCS | Performed by: INTERNAL MEDICINE

## 2022-12-15 PROCEDURE — 2022F DILAT RTA XM EVC RTNOPTHY: CPT | Performed by: INTERNAL MEDICINE

## 2022-12-15 PROCEDURE — 3046F HEMOGLOBIN A1C LEVEL >9.0%: CPT | Performed by: INTERNAL MEDICINE

## 2022-12-15 PROCEDURE — G8484 FLU IMMUNIZE NO ADMIN: HCPCS | Performed by: INTERNAL MEDICINE

## 2022-12-15 PROCEDURE — 4004F PT TOBACCO SCREEN RCVD TLK: CPT | Performed by: INTERNAL MEDICINE

## 2022-12-15 RX ORDER — GLIPIZIDE 5 MG/1
5 TABLET ORAL
Qty: 180 TABLET | Refills: 1 | Status: SHIPPED | OUTPATIENT
Start: 2022-12-15 | End: 2023-03-15

## 2022-12-15 ASSESSMENT — ENCOUNTER SYMPTOMS
SHORTNESS OF BREATH: 0
WHEEZING: 0
BLOOD IN STOOL: 0
COUGH: 0
TROUBLE SWALLOWING: 0
EYE PAIN: 0
ABDOMINAL DISTENTION: 0
DIARRHEA: 0
COLOR CHANGE: 0
EYE DISCHARGE: 0

## 2022-12-15 NOTE — PROGRESS NOTES
Visit Information    Have you changed or started any medications since your last visit including any over-the-counter medicines, vitamins, or herbal medicines? no   Are you having any side effects from any of your medications? -  no  Have you stopped taking any of your medications? Is so, why? -  no    Have you seen any other physician or provider since your last visit? No  Have you had any other diagnostic tests since your last visit? No  Have you been seen in the emergency room and/or had an admission to a hospital since we last saw you? No  Have you had your routine dental cleaning in the past 6 months? no    Have you activated your [employer] account? If not, what are your barriers?  Yes     Patient Care Team:  DAV Cuevas CNP as PCP - General (Family Medicine)  DAV Cuevas CNP as PCP - [address] [place], [place] [address]larissa Hammonds Provider    Medical History Review  Past Medical, Family, and Social History reviewed and does not contribute to the patient presenting condition    Health Maintenance   Topic Date Due    COVID-19 Vaccine (1) Never done    Pneumococcal 0-64 years Vaccine (1 - PCV) Never done    Diabetic foot exam  Never done    Diabetic retinal exam  Never done    Hepatitis B vaccine (1 of 3 - Risk 3-dose series) Never done    DTaP/Tdap/Td vaccine (1 - Tdap) Never done    Breast cancer screen  Never done    Colorectal Cancer Screen  Never done    Diabetic microalbuminuria test  05/18/2022    Lipids  05/18/2022    Flu vaccine (1) Never done    A1C test (Diabetic or Prediabetic)  01/06/2023    Depression Monitoring  11/28/2023    Hepatitis C screen  Completed    HIV screen  Completed    Hepatitis A vaccine  Aged Out    Hib vaccine  Aged Out    Meningococcal (ACWY) vaccine  Aged Out

## 2022-12-15 NOTE — PROGRESS NOTES
[address] [address]  Dept: [phone]  Dept Fax: [phone]    Jose A Logan is a 56 y.o. female who presents today for her medical conditions/complaintsas noted below. Jose A Logan is c/o of   Chief Complaint   Patient presents with    Diabetes     New Patient    Nasal Congestion     Stuffy/dryness x 2 weeks          HPI:     Diabetes   Duration more than 7 years  Modifying factors on Glucophage and other med  Severity uncontrolled sever  Associated signs and symtoms neuropathy/ckd/ CAD. aggravated with sugar diet and better with low sugar diet       HTN  Onset more than 2 years ago  dolly mild to mod  Controlled with current po meds  Not associated with headaches or blurry vision  No chest pain          Hemoglobin A1C (%)   Date Value   10/06/2022 9.4 (H)   06/01/2022 9.3   06/14/2021 8.7             ( goal A1Cis < 7)   Microalb/Crt.  Ratio (mcg/mg creat)   Date Value   05/18/2021 CANNOT BE CALCULATED     LDL Cholesterol (mg/dL)   Date Value   05/18/2021 153 (H)   10/08/2019 120   04/23/2018 131 (H)       (goal LDL is <100)   AST (U/L)   Date Value   05/18/2021 31     ALT (U/L)   Date Value   05/18/2021 51 (H)     BUN (mg/dL)   Date Value   05/18/2021 9     BP Readings from Last 3 Encounters:   12/15/22 136/82   10/19/22 118/83   10/06/22 132/86          (goal 120/80)    Past Medical History:   Diagnosis Date    Abdominal pain     Antinuclear factor positive     Anxiety 1/16/2020    Arthritis     osteoarthritis    Autoimmune disease ([address]     unknown exactly, being worked up    Class 2 severe obesity due to excess calories with serious comorbidity and body mass index (BMI) of 36.0 to 36.9 in adult ([address] 7/27/2020    Depression     Diabetes mellitus ([address]     Edema     Environmental allergies     Epigastric pain     Fibromyalgia     Gastritis     Gastro-esophageal reflux disease without esophagitis 10/6/2017    Hepatic steatosis     Liver lesion     Lumbago     Other seasonal allergic rhinitis 10/6/2017    Plantar fasciitis     Primary fibromyalgia syndrome     PTSD (post-traumatic stress disorder)     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HCC)     Sjogren's disease ([facility]     Type 2 diabetes mellitus with diabetic polyneuropathy ([facility] 5/24/2018      Past Surgical History:   Procedure Laterality Date    HYSTERECTOMY (CERVIX STATUS UNKNOWN)      partial hysterectomy    PAROTIDECTOMY Right     TONSILLECTOMY      UPPER GASTROINTESTINAL ENDOSCOPY  01/26/2016    chronic inactive gastritis    VARICOSE VEIN SURGERY Right        Family History   Problem Relation Age of Onset    Breast Cancer Mother         age 27    High Blood Pressure Mother     Diabetes Mother     High Blood Pressure Maternal Grandfather     Diabetes Maternal Grandfather        Social History     Tobacco Use    Smoking status: Every Day     Packs/day: 0.50     Years: 27.00     Pack years: 13.50     Types: Cigarettes    Smokeless tobacco: Never   Substance Use Topics    Alcohol use: No      Current Outpatient Medications   Medication Sig Dispense Refill    glipiZIDE (GLUCOTROL) 5 MG tablet Take 1 tablet by mouth 2 times daily (before meals) 180 tablet 1    metFORMIN (GLUCOPHAGE) 1000 MG tablet Take 1 tablet by mouth 2 times daily (with meals) 180 tablet 1    albuterol sulfate HFA (VENTOLIN HFA) 108 (90 Base) MCG/ACT inhaler Inhale 2 puffs into the lungs 4 times daily as needed for Wheezing 18 g 5    pravastatin (PRAVACHOL) 20 MG tablet TAKE 1 TABLET BY MOUTH EVERY DAY 90 tablet 1    lidocaine (LIDODERM) 5 % Place 1 patch onto the skin daily as needed      naproxen (NAPROSYN) 500 MG tablet Take 1 tablet by mouth 2 times daily (with meals) 180 tablet 1    blood glucose monitor strips Test 2-3 times a day & as needed for symptoms of irregular blood glucose. BRAND OF CHOICE INSURANCE ALLOWS. 100 strip 11    Blood Glucose Monitoring Suppl (BLOOD GLUCOSE MONITOR SYSTEM) w/Device KIT Use as directed.   Check glucose levels tid and as needed 1 kit 0    Alcohol Swabs (ALCOHOL PREP) PADS Use as directed 100 each 11    Lancets MISC 1 each by Does not apply route 2 times daily 300 each 11    chlorhexidine (PERIDEX) 0.12 % solution TAKE 15 MLS BY MOUTH 2 TIMES DAILY 473 mL 2    fluticasone (FLONASE) 50 MCG/ACT nasal spray PLACE 1 SPRAY IN EACH NOSTRIL 2 TIMES DAILY 16 g 2    silver sulfADIAZINE (SSD) 1 % cream APPLY TOPICALLY DAILY. 400 g 2    omeprazole (PRILOSEC) 20 MG delayed release capsule TAKE 1 CAPSULE BY MOUTH ONCE DAILY AS NEEDED 90 capsule 2    ketotifen (ZADITOR) 0.025 % ophthalmic solution PLACE 1 DROP INTO BOTH EYES 2 TIMES DAILY 5 mL 0    Cholecalciferol (VITAMIN D3) 1.25 MG (17094 UT) CAPS Take by mouth      nystatin (MYCOSTATIN) 193420 UNIT/GM powder Apply topically 4 times daily. 1 Bottle 5    Handicap Placard MISC by Does not apply route Expires December 2025 1 each 0    cyclobenzaprine (FLEXERIL) 10 MG tablet Take 1 tablet by mouth 3 times daily as needed      empagliflozin (JARDIANCE) 25 MG tablet Take 1 tablet by mouth daily (Patient not taking: No sig reported) 30 tablet 1     No current facility-administered medications for this visit.      Allergies   Allergen Reactions    Morphine Swelling    Penicillins Swelling    Amoxicillin     Hydrocodone-Acetaminophen     Oxycodone-Acetaminophen     Percocet [Oxycodone-Acetaminophen]     Trazodone Hcl Other (See Comments)    Ultram [Tramadol]     Vicodin [Hydrocodone-Acetaminophen]        Health Maintenance   Topic Date Due    COVID-19 Vaccine (1) Never done    Pneumococcal 0-64 years Vaccine (1 - PCV) Never done    Diabetic foot exam  Never done    Diabetic retinal exam  Never done    Hepatitis B vaccine (1 of 3 - Risk 3-dose series) Never done    DTaP/Tdap/Td vaccine (1 - Tdap) Never done    Breast cancer screen  Never done    Colorectal Cancer Screen  Never done    Diabetic microalbuminuria test  05/18/2022    Lipids  05/18/2022    Flu vaccine (1) Never done    A1C test (Diabetic or Prediabetic)  01/06/2023    Depression Monitoring  11/28/2023    Hepatitis C screen  Completed    HIV screen  Completed    Hepatitis A vaccine  Aged Out    Hib vaccine  Aged Out    Meningococcal (ACWY) vaccine  Aged Out       Subjective:     Review of Systems   Constitutional:  Negative for appetite change, diaphoresis and fatigue. HENT:  Negative for ear discharge and trouble swallowing. Eyes:  Negative for pain and discharge. Respiratory:  Negative for cough, shortness of breath and wheezing. Cardiovascular:  Negative for chest pain and palpitations. Gastrointestinal:  Negative for abdominal distention, blood in stool and diarrhea. Endocrine: Negative for polydipsia and polyphagia. Genitourinary:  Negative for difficulty urinating and frequency. Musculoskeletal:  Negative for gait problem, myalgias and neck pain. Skin:  Negative for color change and rash. Allergic/Immunologic: Negative for environmental allergies and food allergies. Neurological:  Negative for dizziness and headaches. Hematological:  Negative for adenopathy. Does not bruise/bleed easily. Psychiatric/Behavioral:  Negative for behavioral problems and sleep disturbance. Objective:     Physical Exam  Constitutional:       Appearance: She is well-developed. She is not diaphoretic. Comments: Obese     HENT:      Head: Normocephalic and atraumatic. Eyes:      General:         Right eye: No discharge. Left eye: No discharge. Extraocular Movements:      Right eye: Normal extraocular motion. Left eye: Normal extraocular motion. Conjunctiva/sclera: Conjunctivae normal.      Right eye: Right conjunctiva is not injected. Left eye: Left conjunctiva is not injected. Neck:      Thyroid: No thyroid mass or thyromegaly. Vascular: No JVD. Cardiovascular:      Rate and Rhythm: Normal rate and regular rhythm. Heart sounds: No murmur heard. No friction rub. Pulmonary:      Effort: Pulmonary effort is normal. No tachypnea, bradypnea, accessory muscle usage or respiratory distress. Breath sounds: Normal breath sounds. No wheezing or rales. Abdominal:      General: Bowel sounds are normal. There is no distension. Palpations: Abdomen is soft. Tenderness: There is no abdominal tenderness. There is no rebound. Musculoskeletal:         General: No tenderness. Normal range of motion. Cervical back: Normal range of motion and neck supple. No edema or erythema. Lymphadenopathy:      Head:      Right side of head: No submental or submandibular adenopathy. Left side of head: No submental or submandibular adenopathy. Cervical: No cervical adenopathy. Skin:     General: Skin is warm. Coloration: Skin is not pale. Findings: No bruising, ecchymosis or rash. Neurological:      Mental Status: She is alert and oriented to person, place, and time. Cranial Nerves: No cranial nerve deficit. Sensory: No sensory deficit. Motor: No atrophy or abnormal muscle tone. Coordination: Coordination normal.   Psychiatric:         Mood and Affect: Mood is not anxious. Affect is not angry. Speech: Speech is not slurred. Behavior: Behavior normal. Behavior is not aggressive. Thought Content: Thought content does not include homicidal ideation. Cognition and Memory: Memory is not impaired. /82 (Site: Left Upper Arm)   Wt 176 lb (79.8 kg)   BMI 34.37 kg/m²     Assessment:       Diagnosis Orders   1. Type 2 diabetes mellitus without complication, without long-term current use of insulin ([facility])        2. Class 2 severe obesity due to excess calories with serious comorbidity and body mass index (BMI) of 36.0 to 36.9 in adult ([facility]        3. Moderate episode of recurrent major depressive disorder ([facility]        4.  Type 2 diabetes mellitus with diabetic polyneuropathy, without long-term current use of insulin (HCC)  glipiZIDE (GLUCOTROL) 5 MG tablet      5. Vitamin D deficiency        6. Mixed hyperlipidemia        7. Fibromyalgia        8. Screen for colon cancer  [address] [facility],[address] Floor, [facility], [place], [place]      9. Encounter for screening mammogram for malignant neoplasm of breast  ECHO DIGITAL SCREEN W OR WO CAD BILATERAL                Plan:      Return in about 2 months (around 2/15/2023). Orders Placed This Encounter   Procedures    ECHO DIGITAL SCREEN W OR WO CAD BILATERAL     Standing Status:   Future     Standing Expiration Date:   2/14/2024    [address] [facility],[address], [facility], [place], [place]     Referral Priority:   Routine     Referral Type:   Eval and Treat     Referral Reason:   Specialty Services Required     Referred to Provider:   Sarai Sauer DO     Requested Specialty:   General Surgery     Number of Visits Requested:   1     Orders Placed This Encounter   Medications    glipiZIDE (GLUCOTROL) 5 MG tablet     Sig: Take 1 tablet by mouth 2 times daily (before meals)     Dispense:  180 tablet     Refill:  1    metFORMIN (GLUCOPHAGE) 1000 MG tablet     Sig: Take 1 tablet by mouth 2 times daily (with meals)     Dispense:  180 tablet     Refill:  1    New pt  Seen np before  Was seeing dr Deann Quintero before  But stoped as medicine np took over  But hen her a1c went from 7 to over 9    Pt is refusing insulin and any injection  Fermin restart glipizide and full dose metformin  Pt to keep log  Fermin rev with a1c int jan feb     Concealed against smoking  Smos half a pack a day       Patient given educational materials - see patient instructions. Discussed use, benefit, and side effects of prescribed medications. All patientquestions answered. Pt voiced understanding. Reviewed health maintenance. Instructedto continue current medications, diet and exercise. Patient agreed with treatmentplan. Follow up as directed.        Please note that this chart was generated using voice recognition Dragon dictation software. Although every effort was made to ensure the accuracy of this automated transcription, some errors in transcription may have occurred.      Electronically signed by Gilson Hernandez MD on 12/15/2022 at 11:31 AM

## 2023-01-04 ENCOUNTER — TELEPHONE (OUTPATIENT)
Dept: SURGERY | Age: 48
End: 2023-01-04

## 2023-01-04 NOTE — TELEPHONE ENCOUNTER
Per celeste Obrien / pt office scheduled pt for screening colonoscopy on 1/17/23 . Arrival time 9 am  Procedure start time 11 am   PAT phone call 1/6/23 @ 2:00 pm   Bowel prep sent via my chart pt per pt   PT informed of above . Pt would like bowel prep sent to [facility] Yessy Regan .      Electronically signed by Alem De La Rosa MA on 1/4/2023 at 11:35 AM'

## 2023-01-05 ENCOUNTER — TELEPHONE (OUTPATIENT)
Dept: SURGERY | Age: 48
End: 2023-01-05

## 2023-01-06 ENCOUNTER — HOSPITAL ENCOUNTER (OUTPATIENT)
Dept: PREADMISSION TESTING | Age: 48
Discharge: HOME | End: 2023-01-10

## 2023-01-06 VITALS — WEIGHT: 176 LBS | BODY MASS INDEX: 32.39 KG/M2 | HEIGHT: 62 IN

## 2023-01-06 NOTE — PROGRESS NOTES
Pre-op Instructions For Out-Patient Endoscopy Surgery    Medication Instructions:  Please stop herbs and any supplements now (includes vitamins and minerals). Please contact your surgeon and prescribing physician for pre-op instructions for any blood thinners. Naproxen     If you have inhalers/aerosol treatments at home, please use them the morning of your surgery and bring the inhalers with you to the hospital.    Please take the following medications the morning of your surgery with a sip of water:    Inhaler     Surgery Instructions:  After midnight before surgery:  Do not eat or drink anything, including water, mints, gum, and hard candy. You may brush your teeth without swallowing. No smoking, chewing tobacco, or street drugs. Please shower or bathe before surgery. Please do not wear any cologne, lotion, powder, jewelry, piercings, perfume, makeup, nail polish, hair accessories, or hair spray on the day of surgery. Wear loose comfortable clothing. Leave your valuables at home. Bring a storage case for any glasses/contacts. An adult who is responsible for you MUST drive you home and should be with you for the first 24 hours after surgery. The Day of Surgery:  Arrive at [facility] AT [facility] Surgery Entrance at the time directed by your surgeon and check in at the desk. If you have a living will or healthcare power of , please bring a copy. You will be taken to the pre-op holding area where you will be prepared for surgery. A physical assessment will be performed by a nurse practitioner or house officer. Your IV will be started and you will meet your anesthesiologist.    When you go to surgery, your family will be directed to the surgical waiting room, where the doctor should speak with them after your surgery.     After surgery, you will be taken to the recovery room then when you are awake and stable you will go to the short stay unit for preparation to be discharged. Instructions read to [facility] and understanding verbalized.      1/17/23 Colonoscopy

## 2023-01-13 RX ORDER — DOCUSATE SODIUM 100 MG/1
100 CAPSULE, LIQUID FILLED ORAL 2 TIMES DAILY
Qty: 5 CAPSULE | Refills: 0 | Status: SHIPPED | OUTPATIENT
Start: 2023-01-13 | End: 2023-01-16

## 2023-01-13 RX ORDER — POLYETHYLENE GLYCOL 3350 17 G/17G
238 POWDER, FOR SOLUTION ORAL ONCE
Qty: 238 G | Refills: 0 | Status: SHIPPED | OUTPATIENT
Start: 2023-01-13 | End: 2023-01-13

## 2023-01-17 ENCOUNTER — TELEPHONE (OUTPATIENT)
Dept: SURGERY | Age: 48
End: 2023-01-17

## 2023-01-17 NOTE — TELEPHONE ENCOUNTER
Office reaching out to pt , pt had a colonoscopy scheduled for 9 am arrival time 11 am start time . Surgery is unable to reach pt. Office Lvm for pt to return call at [phone].    Colonoscopy is cancelled as of 9:35 am .    Electronically signed by Nasrin Mancini MA on 1/17/2023 at 9:35 AM

## 2023-01-20 ENCOUNTER — TELEPHONE (OUTPATIENT)
Dept: SURGERY | Age: 48
End: 2023-01-20

## 2023-01-20 NOTE — TELEPHONE ENCOUNTER
Office lvm for pt to return office call in regards to no showed procedure on 01/17/23.   Electronically signed by Arie Gomes MA on 1/20/2023 at 9:31 AM

## 2023-02-09 ENCOUNTER — OFFICE VISIT (OUTPATIENT)
Dept: INTERNAL MEDICINE CLINIC | Age: 48
End: 2023-02-09

## 2023-02-09 VITALS
DIASTOLIC BLOOD PRESSURE: 74 MMHG | HEART RATE: 93 BPM | SYSTOLIC BLOOD PRESSURE: 124 MMHG | HEIGHT: 62 IN | BODY MASS INDEX: 32.02 KG/M2 | WEIGHT: 174 LBS | OXYGEN SATURATION: 98 %

## 2023-02-09 DIAGNOSIS — E66.2 CLASS 1 OBESITY WITH ALVEOLAR HYPOVENTILATION, SERIOUS COMORBIDITY, AND BODY MASS INDEX (BMI) OF 32.0 TO 32.9 IN ADULT (HCC): ICD-10-CM

## 2023-02-09 DIAGNOSIS — Z12.31 ENCOUNTER FOR SCREENING MAMMOGRAM FOR BREAST CANCER: ICD-10-CM

## 2023-02-09 DIAGNOSIS — E11.9 TYPE 2 DIABETES MELLITUS WITHOUT COMPLICATION, WITHOUT LONG-TERM CURRENT USE OF INSULIN (HCC): Primary | ICD-10-CM

## 2023-02-09 DIAGNOSIS — F33.1 MODERATE EPISODE OF RECURRENT MAJOR DEPRESSIVE DISORDER (HCC): ICD-10-CM

## 2023-02-09 DIAGNOSIS — E11.42 TYPE 2 DIABETES MELLITUS WITH DIABETIC POLYNEUROPATHY, WITHOUT LONG-TERM CURRENT USE OF INSULIN (HCC): ICD-10-CM

## 2023-02-09 PROBLEM — E66.812 CLASS 2 SEVERE OBESITY DUE TO EXCESS CALORIES WITH SERIOUS COMORBIDITY AND BODY MASS INDEX (BMI) OF 36.0 TO 36.9 IN ADULT: Status: RESOLVED | Noted: 2020-07-27 | Resolved: 2023-02-09

## 2023-02-09 PROBLEM — E66.01 CLASS 2 SEVERE OBESITY DUE TO EXCESS CALORIES WITH SERIOUS COMORBIDITY AND BODY MASS INDEX (BMI) OF 36.0 TO 36.9 IN ADULT (HCC): Status: RESOLVED | Noted: 2020-07-27 | Resolved: 2023-02-09

## 2023-02-09 PROBLEM — E66.811 CLASS 1 OBESITY WITH ALVEOLAR HYPOVENTILATION AND BODY MASS INDEX (BMI) OF 32.0 TO 32.9 IN ADULT: Status: ACTIVE | Noted: 2023-02-09

## 2023-02-09 LAB — HBA1C MFR BLD: 7.7 %

## 2023-02-09 RX ORDER — LEVOCETIRIZINE DIHYDROCHLORIDE 5 MG/1
TABLET, FILM COATED ORAL
COMMUNITY
Start: 2022-12-15

## 2023-02-09 RX ORDER — POLYETHYLENE GLYCOL 3350 17 G/17G
POWDER, FOR SOLUTION ORAL
COMMUNITY
Start: 2023-01-14

## 2023-02-09 SDOH — ECONOMIC STABILITY: FOOD INSECURITY: WITHIN THE PAST 12 MONTHS, THE FOOD YOU BOUGHT JUST DIDN'T LAST AND YOU DIDN'T HAVE MONEY TO GET MORE.: NEVER TRUE

## 2023-02-09 SDOH — ECONOMIC STABILITY: FOOD INSECURITY: WITHIN THE PAST 12 MONTHS, YOU WORRIED THAT YOUR FOOD WOULD RUN OUT BEFORE YOU GOT MONEY TO BUY MORE.: NEVER TRUE

## 2023-02-09 SDOH — ECONOMIC STABILITY: INCOME INSECURITY: HOW HARD IS IT FOR YOU TO PAY FOR THE VERY BASICS LIKE FOOD, HOUSING, MEDICAL CARE, AND HEATING?: NOT HARD AT ALL

## 2023-02-09 SDOH — ECONOMIC STABILITY: HOUSING INSECURITY
IN THE LAST 12 MONTHS, WAS THERE A TIME WHEN YOU DID NOT HAVE A STEADY PLACE TO SLEEP OR SLEPT IN A SHELTER (INCLUDING NOW)?: NO

## 2023-02-09 ASSESSMENT — PATIENT HEALTH QUESTIONNAIRE - PHQ9
5. POOR APPETITE OR OVEREATING: 0
10. IF YOU CHECKED OFF ANY PROBLEMS, HOW DIFFICULT HAVE THESE PROBLEMS MADE IT FOR YOU TO DO YOUR WORK, TAKE CARE OF THINGS AT HOME, OR GET ALONG WITH OTHER PEOPLE: 0
SUM OF ALL RESPONSES TO PHQ9 QUESTIONS 1 & 2: 0
1. LITTLE INTEREST OR PLEASURE IN DOING THINGS: 0
3. TROUBLE FALLING OR STAYING ASLEEP: 3
2. FEELING DOWN, DEPRESSED OR HOPELESS: 0
SUM OF ALL RESPONSES TO PHQ QUESTIONS 1-9: 3
SUM OF ALL RESPONSES TO PHQ QUESTIONS 1-9: 3
8. MOVING OR SPEAKING SO SLOWLY THAT OTHER PEOPLE COULD HAVE NOTICED. OR THE OPPOSITE, BEING SO FIGETY OR RESTLESS THAT YOU HAVE BEEN MOVING AROUND A LOT MORE THAN USUAL: 0
6. FEELING BAD ABOUT YOURSELF - OR THAT YOU ARE A FAILURE OR HAVE LET YOURSELF OR YOUR FAMILY DOWN: 0
7. TROUBLE CONCENTRATING ON THINGS, SUCH AS READING THE NEWSPAPER OR WATCHING TELEVISION: 0
SUM OF ALL RESPONSES TO PHQ QUESTIONS 1-9: 3
4. FEELING TIRED OR HAVING LITTLE ENERGY: 0
SUM OF ALL RESPONSES TO PHQ QUESTIONS 1-9: 3
9. THOUGHTS THAT YOU WOULD BE BETTER OFF DEAD, OR OF HURTING YOURSELF: 0

## 2023-02-09 ASSESSMENT — ENCOUNTER SYMPTOMS
COUGH: 0
EYE PAIN: 0
TROUBLE SWALLOWING: 0
SHORTNESS OF BREATH: 0
ABDOMINAL DISTENTION: 0
EYE DISCHARGE: 0
DIARRHEA: 0
BLOOD IN STOOL: 0
WHEEZING: 0
COLOR CHANGE: 0

## 2023-02-09 NOTE — PROGRESS NOTES
[address] [address]  Dept: [phone]  Dept Fax: [phone]    Margarita Rouse is a 56 y.o. female who presents today for her medical conditions/complaintsas noted below. Margarita Rouse is c/o of   Chief Complaint   Patient presents with    Menopause     Hot flashes     Diabetes     A1C- 10/6/22= 9.4         HPI:     HTN  Onset more than 2 years ago  dolly mild to mod  Controlled with current po meds  Not associated with headaches or blurry vision  No chest pain    Diabetes   Duration more than 7 years  Modifying factors on Glucophage and other med  Severity uncontrolled sever  Associated signs and symtoms neuropathy/ckd/ CAD. aggravated with sugar diet and better with low sugar diet               Hemoglobin A1C (%)   Date Value   02/09/2023 7.7   10/06/2022 9.4 (H)   06/01/2022 9.3             ( goal A1Cis < 7)   Microalb/Crt.  Ratio (mcg/mg creat)   Date Value   05/18/2021 CANNOT BE CALCULATED     LDL Cholesterol (mg/dL)   Date Value   05/18/2021 153 (H)   10/08/2019 120   04/23/2018 131 (H)       (goal LDL is <100)   AST (U/L)   Date Value   05/18/2021 31     ALT (U/L)   Date Value   05/18/2021 51 (H)     BUN (mg/dL)   Date Value   05/18/2021 9     BP Readings from Last 3 Encounters:   02/09/23 124/74   12/15/22 136/82   10/19/22 118/83          (goal 120/80)    Past Medical History:   Diagnosis Date    Abdominal pain     Antinuclear factor positive     Anxiety 01/16/2020    Arthritis     osteoarthritis    Autoimmune disease ([address]     unknown exactly, being worked up    Class 2 severe obesity due to excess calories with serious comorbidity and body mass index (BMI) of 36.0 to 36.9 in adult ([address] 07/27/2020    Depression     Diabetes mellitus (HCC)     Edema     Environmental allergies     Epigastric pain     Fibromyalgia     Gastritis     Gastro-esophageal reflux disease without esophagitis 10/06/2017    Hepatic steatosis     Liver lesion Lumbago     Other seasonal allergic rhinitis 10/06/2017    Plantar fasciitis     PONV (postoperative nausea and vomiting)     Primary fibromyalgia syndrome     Prolonged emergence from general anesthesia     With Parotidectomy surgery and Shoulder Surgery    PTSD (post-traumatic stress disorder)     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   11/2017 or 2018    TONSILLECTOMY      UPPER GASTROINTESTINAL ENDOSCOPY  01/26/2016    chronic inactive gastritis    VARICOSE VEIN SURGERY Right        Family History   Problem Relation Age of Onset    Breast Cancer Mother         age 27    High Blood Pressure Mother     Diabetes Mother     High Blood Pressure Maternal Grandfather     Diabetes Maternal Grandfather        Social History     Tobacco Use    Smoking status: Every Day     Packs/day: 0.50     Years: 27.00     Pack years: 13.50     Types: Cigarettes    Smokeless tobacco: Never   Substance Use Topics    Alcohol use: No      Current Outpatient Medications   Medication Sig Dispense Refill    levocetirizine (XYZAL) 5 MG tablet TAKE 1 TABLET BY MOUTH EVERY NIGHT      polyethylene glycol (GLYCOLAX) 17 GM/SCOOP powder       VITAMIN E PO Take by mouth      Fexofenadine HCl (ALLEGRA PO) Take by mouth daily      glipiZIDE (GLUCOTROL) 5 MG tablet Take 1 tablet by mouth 2 times daily (before meals) 180 tablet 1    metFORMIN (GLUCOPHAGE) 1000 MG tablet Take 1 tablet by mouth 2 times daily (with meals) 180 tablet 1    albuterol sulfate HFA (VENTOLIN HFA) 108 (90 Base) MCG/ACT inhaler Inhale 2 puffs into the lungs 4 times daily as needed for Wheezing 18 g 5    pravastatin (PRAVACHOL) 20 MG tablet TAKE 1 TABLET BY MOUTH EVERY DAY 90 tablet 1    naproxen (NAPROSYN) 500 MG tablet Take 1 tablet by mouth 2 times daily (with meals) 180 tablet 1    blood glucose monitor strips Test 2-3 times a day & as needed for symptoms of irregular blood glucose. BRAND OF CHOICE INSURANCE ALLOWS. 100 strip 11    Blood Glucose Monitoring Suppl (BLOOD GLUCOSE MONITOR SYSTEM) w/Device KIT Use as directed. Check glucose levels tid and as needed 1 kit 0    Alcohol Swabs (ALCOHOL PREP) PADS Use as directed 100 each 11    Lancets MISC 1 each by Does not apply route 2 times daily 300 each 11    chlorhexidine (PERIDEX) 0.12 % solution TAKE 15 MLS BY MOUTH 2 TIMES DAILY 473 mL 2    fluticasone (FLONASE) 50 MCG/ACT nasal spray PLACE 1 SPRAY IN EACH NOSTRIL 2 TIMES DAILY 16 g 2    silver sulfADIAZINE (SSD) 1 % cream APPLY TOPICALLY DAILY. 400 g 2    omeprazole (PRILOSEC) 20 MG delayed release capsule TAKE 1 CAPSULE BY MOUTH ONCE DAILY AS NEEDED 90 capsule 2    ketotifen (ZADITOR) 0.025 % ophthalmic solution PLACE 1 DROP INTO BOTH EYES 2 TIMES DAILY 5 mL 0    Cholecalciferol (VITAMIN D3) 1.25 MG (04991 UT) CAPS Take by mouth      nystatin (MYCOSTATIN) 487104 UNIT/GM powder Apply topically 4 times daily. 1 Bottle 5    Handicap Placard MISC by Does not apply route Expires December 2025 1 each 0    cyclobenzaprine (FLEXERIL) 10 MG tablet Take 1 tablet by mouth 3 times daily as needed      empagliflozin (JARDIANCE) 25 MG tablet Take 1 tablet by mouth daily (Patient not taking: No sig reported) 30 tablet 1    lidocaine (LIDODERM) 5 % Place 1 patch onto the skin daily as needed (Patient not taking: No sig reported)       No current facility-administered medications for this visit.      Allergies   Allergen Reactions    Morphine Swelling    Penicillins Swelling    Amoxicillin     Hydrocodone-Acetaminophen     Oxycodone-Acetaminophen     Percocet [Oxycodone-Acetaminophen]     Trazodone Hcl Other (See Comments)    Ultram [Tramadol]     Vicodin [Hydrocodone-Acetaminophen]        Health Maintenance   Topic Date Due COVID-19 Vaccine (1) Never done    Pneumococcal 0-64 years Vaccine (1 - PCV) Never done    Diabetic foot exam  Never done    Diabetic retinal exam  Never done    Hepatitis B vaccine (1 of 3 - Risk 3-dose series) Never done    DTaP/Tdap/Td vaccine (1 - Tdap) Never done    Breast cancer screen  Never done    Colorectal Cancer Screen  Never done    Diabetic Alb to Cr ratio (uACR) test  05/18/2022    Lipids  05/18/2022    GFR test (Diabetes, CKD 3-4, OR last GFR 15-59)  05/18/2022    Flu vaccine (1) Never done    Depression Monitoring  11/28/2023    A1C test (Diabetic or Prediabetic)  02/09/2024    Hepatitis C screen  Completed    HIV screen  Completed    Hepatitis A vaccine  Aged Out    Hib vaccine  Aged Out    Meningococcal (ACWY) vaccine  Aged Out       Subjective:     Review of Systems   Constitutional:  Negative for appetite change, diaphoresis and fatigue. Hot flashes     HENT:  Negative for ear discharge and trouble swallowing. Eyes:  Negative for pain and discharge. Respiratory:  Negative for cough, shortness of breath and wheezing. Cardiovascular:  Negative for chest pain and palpitations. Gastrointestinal:  Negative for abdominal distention, blood in stool and diarrhea. Endocrine: Negative for polydipsia and polyphagia. Genitourinary:  Negative for difficulty urinating and frequency. Musculoskeletal:  Negative for gait problem, myalgias and neck pain. Skin:  Negative for color change and rash. Allergic/Immunologic: Negative for environmental allergies and food allergies. Neurological:  Negative for dizziness and headaches. Hematological:  Negative for adenopathy. Does not bruise/bleed easily. Psychiatric/Behavioral:  Negative for behavioral problems and sleep disturbance. Objective:     Physical Exam  Constitutional:       Appearance: She is well-developed. She is not diaphoretic. HENT:      Head: Normocephalic and atraumatic.    Eyes:      General:         Right eye: No discharge. Left eye: No discharge. Extraocular Movements:      Right eye: Normal extraocular motion. Left eye: Normal extraocular motion. Conjunctiva/sclera: Conjunctivae normal.      Right eye: Right conjunctiva is not injected. Left eye: Left conjunctiva is not injected. Neck:      Thyroid: No thyroid mass or thyromegaly. Vascular: No JVD. Cardiovascular:      Rate and Rhythm: Normal rate and regular rhythm. Heart sounds: No murmur heard. No friction rub. Pulmonary:      Effort: Pulmonary effort is normal. No tachypnea, bradypnea, accessory muscle usage or respiratory distress. Breath sounds: Normal breath sounds. No wheezing or rales. Abdominal:      General: Bowel sounds are normal. There is no distension. Palpations: Abdomen is soft. Tenderness: There is no abdominal tenderness. There is no rebound. Musculoskeletal:         General: No tenderness. Normal range of motion. Cervical back: Normal range of motion and neck supple. No edema or erythema. Lymphadenopathy:      Head:      Right side of head: No submental or submandibular adenopathy. Left side of head: No submental or submandibular adenopathy. Cervical: No cervical adenopathy. Skin:     General: Skin is warm. Coloration: Skin is not pale. Findings: No bruising, ecchymosis or rash. Neurological:      Mental Status: She is alert and oriented to person, place, and time. Cranial Nerves: No cranial nerve deficit. Sensory: No sensory deficit. Motor: No atrophy or abnormal muscle tone. Coordination: Coordination normal.   Psychiatric:         Mood and Affect: Mood is not anxious. Affect is not angry. Speech: Speech is not slurred. Behavior: Behavior normal. Behavior is not aggressive. Thought Content: Thought content does not include homicidal ideation. Cognition and Memory: Memory is not impaired.      /74 Pulse 93   Ht 5' 1.5\" (1.562 m)   Wt 174 lb (78.9 kg)   SpO2 98%   BMI 32.34 kg/m²     Assessment:       Diagnosis Orders   1. Type 2 diabetes mellitus without complication, without long-term current use of insulin (HCC)  POCT glycosylated hemoglobin (Hb A1C)    Hemoglobin A1C      2. Type 2 diabetes mellitus with diabetic polyneuropathy, without long-term current use of insulin (HCC)  Microalbumin, Ur    POCT glycosylated hemoglobin (Hb A1C)    Lipid, Fasting    Hemoglobin A1C      3. Moderate episode of recurrent major depressive disorder ([facility]        4. Encounter for screening mammogram for breast cancer  ECHO Digital Screen Bilateral      5. Class 1 obesity with alveolar hypoventilation, serious comorbidity, and body mass index (BMI) of 32.0 to 32.9 in adult [facility])                  Plan:      No follow-ups on file. Orders Placed This Encounter   Procedures    ECHO Digital Screen Bilateral     Standing Status:   Future     Standing Expiration Date:   2/9/2024    Microalbumin, Ur     Standing Status:   Future     Standing Expiration Date:   2/8/2024    Lipid, Fasting     Standing Status:   Future     Standing Expiration Date:   6/9/2023    Hemoglobin A1C     Standing Status:   Future     Standing Expiration Date:   6/9/2023    POCT glycosylated hemoglobin (Hb A1C)     No orders of the defined types were placed in this encounter. A1`c improved form 9 to 7.7  On oral meds  Wt loss advised  Will see back with above in three months  Pt to see her gyn for menopause   Patient given educational materials - see patient instructions. Discussed use, benefit, and side effects of prescribed medications. All patientquestions answered. Pt voiced understanding. Reviewed health maintenance. Instructedto continue current medications, diet and exercise. Patient agreed with treatmentplan. Follow up as directed. Please note that this chart was generated using voice recognition Dragon dictation software.   Although every effort was made to ensure the accuracy of this automated transcription, some errors in transcription may have occurred.      Electronically signed by Ruth Lew MD on 2/9/2023 at 2:58 PM

## 2023-02-28 ENCOUNTER — TELEPHONE (OUTPATIENT)
Dept: INTERNAL MEDICINE CLINIC | Age: 48
End: 2023-02-28

## 2023-02-28 NOTE — TELEPHONE ENCOUNTER
----- Message from Bhavik Rey sent at 2/28/2023 12:52 PM EST -----  Subject: Message to Provider    QUESTIONS  Information for Provider? The patient stated that she is currently a   patient of Dr. Amber Martini but would like to switch to Dr. Tawny Gifford. She stated that she will be more comfortable with a female PCP. The   patient would like a call back. ---------------------------------------------------------------------------  --------------  Alem ALMARAZ  [phone]; OK to leave message on voicemail  ---------------------------------------------------------------------------  --------------  SCRIPT ANSWERS  Relationship to Patient?  Self

## 2023-04-20 ENCOUNTER — OFFICE VISIT (OUTPATIENT)
Dept: INTERNAL MEDICINE CLINIC | Age: 48
End: 2023-04-20

## 2023-04-20 VITALS
DIASTOLIC BLOOD PRESSURE: 78 MMHG | OXYGEN SATURATION: 99 % | HEIGHT: 62 IN | BODY MASS INDEX: 31.47 KG/M2 | SYSTOLIC BLOOD PRESSURE: 122 MMHG | WEIGHT: 171 LBS | HEART RATE: 89 BPM

## 2023-04-20 DIAGNOSIS — E66.2 CLASS 1 OBESITY WITH ALVEOLAR HYPOVENTILATION, SERIOUS COMORBIDITY, AND BODY MASS INDEX (BMI) OF 32.0 TO 32.9 IN ADULT (HCC): ICD-10-CM

## 2023-04-20 DIAGNOSIS — T21.21XA PARTIAL THICKNESS BURN OF CHEST WALL, INITIAL ENCOUNTER: ICD-10-CM

## 2023-04-20 DIAGNOSIS — T20.20XA PARTIAL THICKNESS BURN OF FACE, INITIAL ENCOUNTER: ICD-10-CM

## 2023-04-20 DIAGNOSIS — R21 RASH: Primary | ICD-10-CM

## 2023-04-20 DIAGNOSIS — Z12.31 ENCOUNTER FOR SCREENING MAMMOGRAM FOR BREAST CANCER: ICD-10-CM

## 2023-04-20 DIAGNOSIS — E11.9 TYPE 2 DIABETES MELLITUS WITHOUT COMPLICATION, WITHOUT LONG-TERM CURRENT USE OF INSULIN (HCC): ICD-10-CM

## 2023-04-20 DIAGNOSIS — E78.2 MIXED HYPERLIPIDEMIA: ICD-10-CM

## 2023-04-20 RX ORDER — METHYLPREDNISOLONE 4 MG/1
TABLET ORAL
Qty: 1 KIT | Refills: 0 | Status: SHIPPED | OUTPATIENT
Start: 2023-04-20 | End: 2023-04-26

## 2023-04-20 ASSESSMENT — ENCOUNTER SYMPTOMS
DIARRHEA: 0
BLOOD IN STOOL: 0
EYE PAIN: 0
ABDOMINAL DISTENTION: 0
WHEEZING: 0
TROUBLE SWALLOWING: 0
COLOR CHANGE: 0
SHORTNESS OF BREATH: 0
EYE DISCHARGE: 0
COUGH: 0

## 2023-05-22 ENCOUNTER — TELEPHONE (OUTPATIENT)
Dept: INTERNAL MEDICINE CLINIC | Age: 48
End: 2023-05-22

## 2023-06-07 ENCOUNTER — OFFICE VISIT (OUTPATIENT)
Dept: INTERNAL MEDICINE CLINIC | Age: 48
End: 2023-06-07
Payer: COMMERCIAL

## 2023-06-07 VITALS
BODY MASS INDEX: 31.42 KG/M2 | OXYGEN SATURATION: 97 % | HEART RATE: 78 BPM | DIASTOLIC BLOOD PRESSURE: 78 MMHG | SYSTOLIC BLOOD PRESSURE: 118 MMHG | WEIGHT: 169 LBS

## 2023-06-07 DIAGNOSIS — N95.1 MENOPAUSAL HOT FLUSHES: ICD-10-CM

## 2023-06-07 DIAGNOSIS — E11.42 TYPE 2 DIABETES MELLITUS WITH DIABETIC POLYNEUROPATHY, WITHOUT LONG-TERM CURRENT USE OF INSULIN (HCC): Primary | ICD-10-CM

## 2023-06-07 DIAGNOSIS — H57.9 ALLERGIC EYE REACTION: ICD-10-CM

## 2023-06-07 DIAGNOSIS — J30.1 SEASONAL ALLERGIC RHINITIS DUE TO POLLEN: ICD-10-CM

## 2023-06-07 DIAGNOSIS — Z11.59 NEED FOR HEPATITIS C SCREENING TEST: ICD-10-CM

## 2023-06-07 DIAGNOSIS — R21 RASH: ICD-10-CM

## 2023-06-07 LAB — HBA1C MFR BLD: 8.6 %

## 2023-06-07 PROCEDURE — 2022F DILAT RTA XM EVC RTNOPTHY: CPT | Performed by: INTERNAL MEDICINE

## 2023-06-07 PROCEDURE — 83036 HEMOGLOBIN GLYCOSYLATED A1C: CPT | Performed by: INTERNAL MEDICINE

## 2023-06-07 PROCEDURE — G8427 DOCREV CUR MEDS BY ELIG CLIN: HCPCS | Performed by: INTERNAL MEDICINE

## 2023-06-07 PROCEDURE — 99214 OFFICE O/P EST MOD 30 MIN: CPT | Performed by: INTERNAL MEDICINE

## 2023-06-07 PROCEDURE — 4004F PT TOBACCO SCREEN RCVD TLK: CPT | Performed by: INTERNAL MEDICINE

## 2023-06-07 PROCEDURE — G8417 CALC BMI ABV UP PARAM F/U: HCPCS | Performed by: INTERNAL MEDICINE

## 2023-06-07 PROCEDURE — 3052F HG A1C>EQUAL 8.0%<EQUAL 9.0%: CPT | Performed by: INTERNAL MEDICINE

## 2023-06-07 RX ORDER — GLUCOSAMINE HCL/CHONDROITIN SU 500-400 MG
1 CAPSULE ORAL 4 TIMES DAILY
Qty: 100 STRIP | Refills: 11 | Status: SHIPPED | OUTPATIENT
Start: 2023-06-07

## 2023-06-07 RX ORDER — LANCETS 30 GAUGE
1 EACH MISCELLANEOUS DAILY
Qty: 100 EACH | Refills: 11 | Status: SHIPPED | OUTPATIENT
Start: 2023-06-07

## 2023-06-07 RX ORDER — TRIAMCINOLONE ACETONIDE 5 MG/G
CREAM TOPICAL
Qty: 1 EACH | Refills: 3 | Status: SHIPPED | OUTPATIENT
Start: 2023-06-07 | End: 2023-06-14

## 2023-06-07 RX ORDER — LORATADINE 10 MG/1
10 TABLET ORAL DAILY
Qty: 30 TABLET | Refills: 3 | Status: SHIPPED | OUTPATIENT
Start: 2023-06-07 | End: 2023-10-05

## 2023-06-07 RX ORDER — KETOTIFEN FUMARATE 0.35 MG/ML
1 SOLUTION/ DROPS OPHTHALMIC 2 TIMES DAILY
Qty: 5 ML | Refills: 0 | Status: SHIPPED | OUTPATIENT
Start: 2023-06-07

## 2023-06-07 RX ORDER — SERTRALINE HYDROCHLORIDE 25 MG/1
25 TABLET, FILM COATED ORAL DAILY
Qty: 30 TABLET | Refills: 3 | Status: SHIPPED | OUTPATIENT
Start: 2023-06-07

## 2023-06-07 RX ORDER — GLIPIZIDE 5 MG/1
5 TABLET ORAL
Qty: 180 TABLET | Refills: 3 | Status: SHIPPED | OUTPATIENT
Start: 2023-06-07 | End: 2024-06-01

## 2023-06-07 NOTE — PROGRESS NOTES
supplies  -     Lancets MISC; 1 each by Does not apply route daily       FOLLOW UP AND INSTRUCTIONS:   No follow-ups on file. Britany Gamboa received counseling on the following healthy behaviors: nutrition    Discussed use, benefit, and side effects of prescribed medications. Barriers to medication compliance addressed. All patient questions answered. Pt voiced understanding. Patient given educational materials - see patient instructions    MD LONNIE MontoyaP[facility]  6/7/2023, 8:17 AM    Please note that this chart was generated using voice recognition Dragon dictation software. Although every effort was made to ensure the accuracy of this automatedtranscription, some errors in transcription may have occurred.

## 2023-08-08 ENCOUNTER — HOSPITAL ENCOUNTER (OUTPATIENT)
Age: 48
Setting detail: SPECIMEN
Discharge: HOME | End: 2023-08-08

## 2023-08-08 ENCOUNTER — OFFICE VISIT (OUTPATIENT)
Dept: INTERNAL MEDICINE CLINIC | Age: 48
End: 2023-08-08
Payer: COMMERCIAL

## 2023-08-08 VITALS
BODY MASS INDEX: 31.27 KG/M2 | HEART RATE: 68 BPM | WEIGHT: 168.2 LBS | SYSTOLIC BLOOD PRESSURE: 134 MMHG | DIASTOLIC BLOOD PRESSURE: 84 MMHG | OXYGEN SATURATION: 98 %

## 2023-08-08 DIAGNOSIS — M25.541 ARTHRALGIA OF BOTH HANDS: ICD-10-CM

## 2023-08-08 DIAGNOSIS — Z11.59 NEED FOR HEPATITIS C SCREENING TEST: ICD-10-CM

## 2023-08-08 DIAGNOSIS — R20.2 PARESTHESIA OF BOTH HANDS: ICD-10-CM

## 2023-08-08 DIAGNOSIS — R21 RASH: ICD-10-CM

## 2023-08-08 DIAGNOSIS — Z12.11 SCREEN FOR COLON CANCER: ICD-10-CM

## 2023-08-08 DIAGNOSIS — K76.0 FATTY LIVER: ICD-10-CM

## 2023-08-08 DIAGNOSIS — M25.542 ARTHRALGIA OF BOTH HANDS: ICD-10-CM

## 2023-08-08 DIAGNOSIS — Z12.31 ENCOUNTER FOR SCREENING MAMMOGRAM FOR BREAST CANCER: ICD-10-CM

## 2023-08-08 DIAGNOSIS — E11.9 TYPE 2 DIABETES MELLITUS WITHOUT COMPLICATION, WITHOUT LONG-TERM CURRENT USE OF INSULIN (HCC): Primary | ICD-10-CM

## 2023-08-08 DIAGNOSIS — E11.42 TYPE 2 DIABETES MELLITUS WITH DIABETIC POLYNEUROPATHY, WITHOUT LONG-TERM CURRENT USE OF INSULIN (HCC): ICD-10-CM

## 2023-08-08 LAB
ALBUMIN SERPL-MCNC: 4.3 G/DL (ref 3.5–5.2)
ALBUMIN/GLOB SERPL: 1.3 {RATIO} (ref 1–2.5)
ALP SERPL-CCNC: 111 U/L (ref 35–104)
ALT SERPL-CCNC: 15 U/L (ref 5–33)
ANION GAP SERPL CALCULATED.3IONS-SCNC: 14 MMOL/L (ref 9–17)
AST SERPL-CCNC: 16 U/L
BASOPHILS # BLD: 0.04 K/UL (ref 0–0.2)
BASOPHILS NFR BLD: 1 % (ref 0–2)
BILIRUB SERPL-MCNC: 0.3 MG/DL (ref 0.3–1.2)
BUN SERPL-MCNC: 13 MG/DL (ref 6–20)
CALCIUM SERPL-MCNC: 9.5 MG/DL (ref 8.6–10.4)
CHLORIDE SERPL-SCNC: 104 MMOL/L (ref 98–107)
CO2 SERPL-SCNC: 23 MMOL/L (ref 20–31)
CREAT SERPL-MCNC: 0.8 MG/DL (ref 0.5–0.9)
CRP SERPL HS-MCNC: 3.1 MG/L (ref 0–5)
EOSINOPHIL # BLD: 0.25 K/UL (ref 0–0.44)
EOSINOPHILS RELATIVE PERCENT: 4 % (ref 1–4)
ERYTHROCYTE [DISTWIDTH] IN BLOOD BY AUTOMATED COUNT: 14.1 % (ref 11.8–14.4)
ERYTHROCYTE [SEDIMENTATION RATE] IN BLOOD BY PHOTOMETRIC METHOD: 4 MM/HR (ref 0–20)
GFR SERPL CREATININE-BSD FRML MDRD: >60 ML/MIN/1.73M2
GLUCOSE SERPL-MCNC: 223 MG/DL (ref 70–99)
HCT VFR BLD AUTO: 41.2 % (ref 36.3–47.1)
HCV AB SERPL QL IA: NONREACTIVE
HGB BLD-MCNC: 14.1 G/DL (ref 11.9–15.1)
IMM GRANULOCYTES # BLD AUTO: <0.03 K/UL (ref 0–0.3)
IMM GRANULOCYTES NFR BLD: 0 %
LYMPHOCYTES NFR BLD: 2 K/UL (ref 1.1–3.7)
LYMPHOCYTES RELATIVE PERCENT: 33 % (ref 24–43)
MCH RBC QN AUTO: 30.6 PG (ref 25.2–33.5)
MCHC RBC AUTO-ENTMCNC: 34.2 G/DL (ref 28.4–34.8)
MCV RBC AUTO: 89.4 FL (ref 82.6–102.9)
MONOCYTES NFR BLD: 0.48 K/UL (ref 0.1–1.2)
MONOCYTES NFR BLD: 8 % (ref 3–12)
NEUTROPHILS NFR BLD: 55 % (ref 36–65)
NEUTS SEG NFR BLD: 3.36 K/UL (ref 1.5–8.1)
NRBC BLD-RTO: 0 PER 100 WBC
PLATELET # BLD AUTO: ABNORMAL K/UL (ref 138–453)
PLATELET, FLUORESCENCE: 236 K/UL (ref 138–453)
PLATELETS.RETICULATED NFR BLD AUTO: 12 % (ref 1.1–10.3)
POTASSIUM SERPL-SCNC: 4.1 MMOL/L (ref 3.7–5.3)
PROT SERPL-MCNC: 7.5 G/DL (ref 6.4–8.3)
RBC # BLD AUTO: 4.61 M/UL (ref 3.95–5.11)
RHEUMATOID FACT SER NEPH-ACNC: <10 IU/ML
SODIUM SERPL-SCNC: 141 MMOL/L (ref 135–144)
WBC OTHER # BLD: 6.2 K/UL (ref 3.5–11.3)

## 2023-08-08 PROCEDURE — 99214 OFFICE O/P EST MOD 30 MIN: CPT | Performed by: INTERNAL MEDICINE

## 2023-08-08 PROCEDURE — 3052F HG A1C>EQUAL 8.0%<EQUAL 9.0%: CPT | Performed by: INTERNAL MEDICINE

## 2023-08-08 PROCEDURE — G8417 CALC BMI ABV UP PARAM F/U: HCPCS | Performed by: INTERNAL MEDICINE

## 2023-08-08 PROCEDURE — 2022F DILAT RTA XM EVC RTNOPTHY: CPT | Performed by: INTERNAL MEDICINE

## 2023-08-08 PROCEDURE — 4004F PT TOBACCO SCREEN RCVD TLK: CPT | Performed by: INTERNAL MEDICINE

## 2023-08-08 PROCEDURE — G8427 DOCREV CUR MEDS BY ELIG CLIN: HCPCS | Performed by: INTERNAL MEDICINE

## 2023-08-08 RX ORDER — PREGABALIN 50 MG/1
50 CAPSULE ORAL NIGHTLY
Qty: 30 CAPSULE | Refills: 0 | Status: SHIPPED | OUTPATIENT
Start: 2023-08-08 | End: 2023-09-07

## 2023-08-08 NOTE — PROGRESS NOTES
Visit Information    Have you changed or started any medications since your last visit including any over-the-counter medicines, vitamins, or herbal medicines? no   Are you having any side effects from any of your medications? -  no  Have you stopped taking any of your medications? Is so, why? -  no    Have you seen any other physician or provider since your last visit? No  Have you had any other diagnostic tests since your last visit? No  Have you been seen in the emergency room and/or had an admission to a hospital since we last saw you? No  Have you had your routine dental cleaning in the past 6 months? no    Have you activated your [employer] account? If not, what are your barriers?  Yes     Patient Care Team:  Yuly Duncan MD as PCP - General (Internal Medicine)  Yuly Duncan MD as PCP - Empaneled Provider    Medical History Review  Past Medical, Family, and Social History reviewed and does not contribute to the patient presenting condition    Health Maintenance   Topic Date Due    COVID-19 Vaccine (1) Never done    Pneumococcal 0-64 years Vaccine (1 - PCV) Never done    Diabetic foot exam  Never done    Diabetic retinal exam  Never done    Hepatitis B vaccine (1 of 3 - Risk 3-dose series) Never done    DTaP/Tdap/Td vaccine (1 - Tdap) Never done    Breast cancer screen  Never done    Colorectal Cancer Screen  Never done    Diabetic Alb to Cr ratio (uACR) test  05/18/2022    Lipids  05/18/2022    GFR test (Diabetes, CKD 3-4, OR last GFR 15-59)  05/18/2022    Flu vaccine (1) Never done    Depression Monitoring  02/09/2024    A1C test (Diabetic or Prediabetic)  06/07/2024    Hepatitis C screen  Completed    HIV screen  Completed    Hepatitis A vaccine  Aged Out    Hib vaccine  Aged Out    Meningococcal (ACWY) vaccine  Aged Out
joints    Complaining of morning stiffness  Also has sjogren   EMG in 2014  consistent with carpal tunnel   HECTOR in 2021 was negaive   Has been several Rheuntologist in the past     Allergic to many medications,      Patient Active Problem List   Diagnosis    Fibromyalgia    Antinuclear factor positive    Hepatic steatosis    Liver lesion    Gastritis    Elevated liver enzymes    Tobacco abuse    Anxiety    Type 2 diabetes mellitus without complication, without long-term current use of insulin (HCC)    Mixed hyperlipidemia    Gastro-esophageal reflux disease without esophagitis    Complete rotator cuff tear or rupture of right shoulder, not specified as traumatic    Nicotine dependence, cigarettes, uncomplicated    Allergic rhinitis due to allergen    Vitamin D deficiency    Pure hypercholesterolemia    Family history of malignant neoplasm of breast    Type 2 diabetes mellitus with diabetic polyneuropathy (HCC)    BMI 36.0-36.9,adult    Moderate episode of recurrent major depressive disorder (HCC)    PTSD (post-traumatic stress disorder)    Lumbar radiculopathy    Bilateral shoulder region arthritis    Psychophysiological insomnia    Arthritis of left hip    Current every day smoker    Class 1 obesity with alveolar hypoventilation and body mass index (BMI) of 32.0 to 32.9 in adult [facility])       Health Maintenance Due   Topic Date Due    COVID-19 Vaccine (1) Never done    Pneumococcal 0-64 years Vaccine (1 - PCV) Never done    Diabetic foot exam  Never done    Diabetic retinal exam  Never done    Hepatitis B vaccine (1 of 3 - Risk 3-dose series) Never done    DTaP/Tdap/Td vaccine (1 - Tdap) Never done    Breast cancer screen  Never done    Colorectal Cancer Screen  Never done    Diabetic Alb to Cr ratio (uACR) test  05/18/2022    Lipids  05/18/2022    GFR test (Diabetes, CKD 3-4, OR last GFR 15-59)  05/18/2022    Flu vaccine (1) Never done       Allergies   Allergen Reactions    Morphine Swelling    Penicillins Swelling

## 2023-08-11 LAB
ANA SER QL IA: NEGATIVE
CCP AB SER IA-ACNC: <0.4 U/ML (ref 0–7)
DSDNA IGG SER QL IA: <0.5 IU/ML
NUCLEAR IGG SER IA-RTO: <0.1 U/ML

## 2023-09-07 LAB — NONINV COLON CA DNA+OCC BLD SCRN STL QL: NEGATIVE

## 2023-09-15 ENCOUNTER — OFFICE VISIT (OUTPATIENT)
Dept: INTERNAL MEDICINE CLINIC | Age: 48
End: 2023-09-15
Payer: COMMERCIAL

## 2023-09-15 VITALS
OXYGEN SATURATION: 97 % | SYSTOLIC BLOOD PRESSURE: 124 MMHG | DIASTOLIC BLOOD PRESSURE: 84 MMHG | WEIGHT: 168.4 LBS | BODY MASS INDEX: 31.3 KG/M2 | HEART RATE: 94 BPM

## 2023-09-15 DIAGNOSIS — E11.9 TYPE 2 DIABETES MELLITUS WITHOUT COMPLICATION, WITHOUT LONG-TERM CURRENT USE OF INSULIN (HCC): ICD-10-CM

## 2023-09-15 DIAGNOSIS — M25.542 ARTHRALGIA OF BOTH HANDS: Primary | ICD-10-CM

## 2023-09-15 DIAGNOSIS — E53.8 VITAMIN B12 DEFICIENCY: ICD-10-CM

## 2023-09-15 DIAGNOSIS — M25.541 ARTHRALGIA OF BOTH HANDS: Primary | ICD-10-CM

## 2023-09-15 PROCEDURE — 3052F HG A1C>EQUAL 8.0%<EQUAL 9.0%: CPT | Performed by: INTERNAL MEDICINE

## 2023-09-15 PROCEDURE — 2022F DILAT RTA XM EVC RTNOPTHY: CPT | Performed by: INTERNAL MEDICINE

## 2023-09-15 PROCEDURE — 4004F PT TOBACCO SCREEN RCVD TLK: CPT | Performed by: INTERNAL MEDICINE

## 2023-09-15 PROCEDURE — G8417 CALC BMI ABV UP PARAM F/U: HCPCS | Performed by: INTERNAL MEDICINE

## 2023-09-15 PROCEDURE — 99214 OFFICE O/P EST MOD 30 MIN: CPT | Performed by: INTERNAL MEDICINE

## 2023-09-15 PROCEDURE — G8427 DOCREV CUR MEDS BY ELIG CLIN: HCPCS | Performed by: INTERNAL MEDICINE

## 2023-09-15 NOTE — PROGRESS NOTES
Visit Information    Have you changed or started any medications since your last visit including any over-the-counter medicines, vitamins, or herbal medicines? no   Are you having any side effects from any of your medications? -  no  Have you stopped taking any of your medications? Is so, why? -  no    Have you seen any other physician or provider since your last visit? No  Have you had any other diagnostic tests since your last visit? No  Have you been seen in the emergency room and/or had an admission to a hospital since we last saw you? No  Have you had your routine dental cleaning in the past 6 months? no    Have you activated your [employer] account? If not, what are your barriers?  Yes     Patient Care Team:  Yuly Duncan MD as PCP - General (Internal Medicine)  Yuly Duncan MD as PCP - Empaneled Provider    Medical History Review  Past Medical, Family, and Social History reviewed and does not contribute to the patient presenting condition    Health Maintenance   Topic Date Due    Hepatitis B vaccine (1 of 3 - 3-dose series) Never done    COVID-19 Vaccine (1) Never done    Pneumococcal 0-64 years Vaccine (1 - PCV) Never done    Diabetic foot exam  Never done    Diabetic retinal exam  Never done    DTaP/Tdap/Td vaccine (1 - Tdap) Never done    Breast cancer screen  Never done    Diabetic Alb to Cr ratio (uACR) test  05/18/2022    Lipids  05/18/2022    Flu vaccine (1) Never done    Depression Monitoring  02/09/2024    A1C test (Diabetic or Prediabetic)  06/07/2024    GFR test (Diabetes, CKD 3-4, OR last GFR 15-59)  08/08/2024    Colorectal Cancer Screen  08/29/2026    Hepatitis C screen  Completed    HIV screen  Completed    Hepatitis A vaccine  Aged Out    Hib vaccine  Aged Out    Meningococcal (ACWY) vaccine  Aged Out

## 2023-09-15 NOTE — PROGRESS NOTES
[address] [place] [id number]  Dept: [phone]  Dept Fax: [phone]    Office Progress/Follow Up Note  Date of patient's visit: 9/15/2023  Patient's Name:  Hussein Abdi YOB: 1975            Patient Care Team:  Flako Bahena MD as PCP - General (Internal Medicine)  Flako Bahena MD as PCP - Empaneled Provider    REASON FOR VISIT: Routine outpatient follow up/Same day visit/Post hospital/ED visit    HISTORY OF PRESENT ILLNESS:      Chief Complaint   Patient presents with    Diabetes     Follow up  Paitent stated that she has not been able to check her sugars         History was obtained from the patient.  El Fee is a 53 y.o. is here for a follow-up, patient's medical comorbidities including diabetes, hyperlipidemia, GERD,    Not taking blood sugars at home, requesting a glucometer, treatment as well, advised diet and exercise,  Glipizide added, HbA1c 8.6    Patient complaining of severe hot flashes, going on for many months, wants to be started on medication, allergic to multiple antidepressants SSRI, stated that she has taken SSRI in the past with seem to help, described, was told if notices any side effects she should stop, patient voiced understand    Also complaining of rash on feet and ankles,    Questionable liver lesion , patient was seen by GI, was last seen a year ago,        CT was ordered but patient did not get it done, will order advised to follow-up with GI     8/8  Numbness and swelling in hand s  Going on from last many yrs, now getting worse, has tried gabapentin in the past did not tolerate it, was taking Cymbalta and Lyrica, does not remember what happened but it was stopped, said the Lyrica was likely not covered by insurance and Cymbalta made her too groggy  Swelling of the  proximal  interphalangeal joints    Complaining of morning stiffness  Also has sjogren   EMG in 2014  consistent with carpal tunnel   HECTOR in

## 2023-09-18 ENCOUNTER — TELEPHONE (OUTPATIENT)
Dept: INTERNAL MEDICINE CLINIC | Age: 48
End: 2023-09-18

## 2023-09-18 NOTE — TELEPHONE ENCOUNTER
----- Message from Obey Huff sent at 9/15/2023 11:16 AM EDT -----  Subject: Message to Provider    QUESTIONS  Information for Provider? Patient needs additional information summitted   to the Rheumatology office. 1 to 2 office notes & any lab & testing. Fax #   [phone].   ---------------------------------------------------------------------------  --------------  Becka Dowd [place]  [phone]; OK to leave message on voicemail  ---------------------------------------------------------------------------  --------------  SCRIPT ANSWERS  Relationship to Patient?  Self

## 2023-10-06 ENCOUNTER — TELEPHONE (OUTPATIENT)
Dept: INTERNAL MEDICINE CLINIC | Age: 48
End: 2023-10-06

## 2023-10-06 NOTE — TELEPHONE ENCOUNTER
----- Message from Enoch Prieto sent at 10/6/2023  9:31 AM EDT -----  Subject: Message to Provider    QUESTIONS  Information for Provider? Patient went to Rheumatologist & they want to   prescribe Gabapentin 100 mg. Patient wants to know what type of reaction   to this medication she had. Please call & advise?   ---------------------------------------------------------------------------  --------------  Nitin Riojas INFO  [phone]; OK to leave message on voicemail  ---------------------------------------------------------------------------  --------------  SCRIPT ANSWERS  Relationship to Patient?  Self

## 2023-10-06 NOTE — TELEPHONE ENCOUNTER
Patient called back stating she called the Rheumatologist's office back since they were the one's to prescribe the medication.  They told her to call her PCP's office back and see what we suggest.     Please advise if there is anything that she can take for her nerve pain

## 2023-10-06 NOTE — TELEPHONE ENCOUNTER
Called patient and informed her that she will need to call and speak to the physician that prescribe the gabapentin the last time.

## 2023-10-17 ENCOUNTER — OFFICE VISIT (OUTPATIENT)
Dept: INTERNAL MEDICINE CLINIC | Age: 48
End: 2023-10-17
Payer: COMMERCIAL

## 2023-10-17 VITALS
OXYGEN SATURATION: 98 % | WEIGHT: 168 LBS | HEART RATE: 78 BPM | BODY MASS INDEX: 31.23 KG/M2 | SYSTOLIC BLOOD PRESSURE: 122 MMHG | DIASTOLIC BLOOD PRESSURE: 78 MMHG

## 2023-10-17 DIAGNOSIS — M54.16 LUMBAR RADICULOPATHY: ICD-10-CM

## 2023-10-17 DIAGNOSIS — F17.200 CURRENT EVERY DAY SMOKER: ICD-10-CM

## 2023-10-17 DIAGNOSIS — K76.9 LIVER LESION: ICD-10-CM

## 2023-10-17 DIAGNOSIS — F17.210 NICOTINE DEPENDENCE, CIGARETTES, UNCOMPLICATED: ICD-10-CM

## 2023-10-17 DIAGNOSIS — K21.9 GASTRO-ESOPHAGEAL REFLUX DISEASE WITHOUT ESOPHAGITIS: ICD-10-CM

## 2023-10-17 DIAGNOSIS — E78.2 MIXED HYPERLIPIDEMIA: ICD-10-CM

## 2023-10-17 DIAGNOSIS — E11.9 TYPE 2 DIABETES MELLITUS WITHOUT COMPLICATION, WITHOUT LONG-TERM CURRENT USE OF INSULIN (HCC): Primary | ICD-10-CM

## 2023-10-17 DIAGNOSIS — E55.9 VITAMIN D DEFICIENCY: ICD-10-CM

## 2023-10-17 DIAGNOSIS — K76.0 HEPATIC STEATOSIS: ICD-10-CM

## 2023-10-17 LAB — HBA1C MFR BLD: 7.6 %

## 2023-10-17 PROCEDURE — 3051F HG A1C>EQUAL 7.0%<8.0%: CPT | Performed by: INTERNAL MEDICINE

## 2023-10-17 PROCEDURE — G8484 FLU IMMUNIZE NO ADMIN: HCPCS | Performed by: INTERNAL MEDICINE

## 2023-10-17 PROCEDURE — 99214 OFFICE O/P EST MOD 30 MIN: CPT | Performed by: INTERNAL MEDICINE

## 2023-10-17 PROCEDURE — 83036 HEMOGLOBIN GLYCOSYLATED A1C: CPT | Performed by: INTERNAL MEDICINE

## 2023-10-17 PROCEDURE — 2022F DILAT RTA XM EVC RTNOPTHY: CPT | Performed by: INTERNAL MEDICINE

## 2023-10-17 PROCEDURE — G8417 CALC BMI ABV UP PARAM F/U: HCPCS | Performed by: INTERNAL MEDICINE

## 2023-10-17 PROCEDURE — 4004F PT TOBACCO SCREEN RCVD TLK: CPT | Performed by: INTERNAL MEDICINE

## 2023-10-17 PROCEDURE — G8427 DOCREV CUR MEDS BY ELIG CLIN: HCPCS | Performed by: INTERNAL MEDICINE

## 2023-10-17 NOTE — PROGRESS NOTES
Visit Information    Have you changed or started any medications since your last visit including any over-the-counter medicines, vitamins, or herbal medicines? no   Are you having any side effects from any of your medications? -  no  Have you stopped taking any of your medications? Is so, why? -  no    Have you seen any other physician or provider since your last visit? No  Have you had any other diagnostic tests since your last visit? No  Have you been seen in the emergency room and/or had an admission to a hospital since we last saw you? No  Have you had your routine dental cleaning in the past 6 months? no    Have you activated your [employer] account? If not, what are your barriers?  Yes     Patient Care Team:  Scott Del Rosario MD as PCP - General (Internal Medicine)  Scott Del Rosario MD as PCP - Empaneled Provider    Medical History Review  Past Medical, Family, and Social History reviewed and does not contribute to the patient presenting condition    Health Maintenance   Topic Date Due    Hepatitis B vaccine (1 of 3 - 3-dose series) Never done    COVID-19 Vaccine (1) Never done    Pneumococcal 0-64 years Vaccine (1 - PCV) Never done    Diabetic foot exam  Never done    Diabetic retinal exam  Never done    DTaP/Tdap/Td vaccine (1 - Tdap) Never done    Breast cancer screen  Never done    Diabetic Alb to Cr ratio (uACR) test  05/18/2022    Lipids  05/18/2022    Flu vaccine (1) Never done    Depression Monitoring  02/09/2024    A1C test (Diabetic or Prediabetic)  06/07/2024    GFR test (Diabetes, CKD 3-4, OR last GFR 15-59)  08/08/2024    Colorectal Cancer Screen  08/29/2026    Hepatitis C screen  Completed    HIV screen  Completed    Hepatitis A vaccine  Aged Out    Hib vaccine  Aged Out    Meningococcal (ACWY) vaccine  Aged Out
141 08/08/2023 09:56 AM    K 4.1 08/08/2023 09:56 AM     08/08/2023 09:56 AM    CO2 23 08/08/2023 09:56 AM    BUN 13 08/08/2023 09:56 AM    CREATININE 0.8 08/08/2023 09:56 AM    GLUCOSE 223 08/08/2023 09:56 AM       HEMOGLOBIN A1C:   Lab Results   Component Value Date/Time    LABA1C 8.6 06/07/2023 07:49 AM       FASTING LIPID PANEL:  Lab Results   Component Value Date    CHOL 193 10/08/2019    HDL 38 (L) 05/18/2021    TRIG 165 (H) 10/08/2019       No results found for: \"TSHREFFT4\"     No valid procedures specified. ASSESSMENT AND PLAN:      Diagnoses and all orders for this visit:  Type 2 diabetes mellitus without complication, without long-term current use of insulin (HCC)  -     POCT glycosylated hemoglobin (Hb A1C)  -     metFORMIN (GLUCOPHAGE) 850 MG tablet; Take 1 tablet by mouth 2 times daily (with meals)  Liver lesion  Hepatic steatosis  Gastro-esophageal reflux disease without esophagitis  Lumbar radiculopathy  Current every day smoker  Mixed hyperlipidemia  Nicotine dependence, cigarettes, uncomplicated  Vitamin D deficiency         FOLLOW UP AND INSTRUCTIONS:   No follow-ups on file. Christin Arias received counseling on the following healthy behaviors: nutrition    Discussed use, benefit, and side effects of prescribed medications. Barriers to medication compliance addressed. All patient questions answered. Pt voiced understanding. Patient given educational materials - see patient instructions    MD LONNIE [facility]  10/17/2023, 9:41 AM    Please note that this chart was generated using voice recognition Dragon dictation software. Although every effort was made to ensure the accuracy of this automatedtranscription, some errors in transcription may have occurred.

## 2023-11-01 DIAGNOSIS — B37.2 CANDIDAL INTERTRIGO: ICD-10-CM

## 2023-11-01 DIAGNOSIS — J30.2 OTHER SEASONAL ALLERGIC RHINITIS: ICD-10-CM

## 2023-11-01 DIAGNOSIS — Z88.9 MULTIPLE ALLERGIES: ICD-10-CM

## 2023-11-01 DIAGNOSIS — E78.2 MIXED HYPERLIPIDEMIA: ICD-10-CM

## 2023-11-01 RX ORDER — LIDOCAINE 50 MG/G
1 PATCH TOPICAL DAILY PRN
Qty: 30 PATCH | Refills: 3 | Status: SHIPPED | OUTPATIENT
Start: 2023-11-01

## 2023-11-01 RX ORDER — PRAVASTATIN SODIUM 20 MG
20 TABLET ORAL DAILY
Qty: 90 TABLET | Refills: 1 | Status: SHIPPED | OUTPATIENT
Start: 2023-11-01

## 2023-11-01 RX ORDER — NYSTATIN 100000 [USP'U]/G
POWDER TOPICAL
Qty: 1 EACH | Refills: 5 | Status: SHIPPED | OUTPATIENT
Start: 2023-11-01

## 2023-11-01 RX ORDER — EPINEPHRINE 0.3 MG/.3ML
INJECTION SUBCUTANEOUS
Qty: 2 EACH | Refills: 0 | Status: SHIPPED | OUTPATIENT
Start: 2023-11-01

## 2023-11-01 RX ORDER — FLUTICASONE PROPIONATE 50 MCG
SPRAY, SUSPENSION (ML) NASAL
Qty: 16 G | Refills: 2 | Status: SHIPPED | OUTPATIENT
Start: 2023-11-01

## 2023-11-01 RX ORDER — CYCLOBENZAPRINE HCL 10 MG
10 TABLET ORAL 3 TIMES DAILY PRN
Qty: 60 TABLET | Refills: 0 | Status: SHIPPED | OUTPATIENT
Start: 2023-11-01

## 2023-11-01 NOTE — TELEPHONE ENCOUNTER
Also needs script sent for nicotine gum. Also needs a new eye drop ordered because the ketotifen has been recalled.

## 2023-11-20 ENCOUNTER — TELEPHONE (OUTPATIENT)
Dept: INTERNAL MEDICINE CLINIC | Age: 48
End: 2023-11-20

## 2023-11-20 RX ORDER — KETOROLAC TROMETHAMINE 10 MG/1
10 TABLET, FILM COATED ORAL EVERY 12 HOURS PRN
Qty: 20 TABLET | Refills: 0 | Status: SHIPPED | OUTPATIENT
Start: 2023-11-20 | End: 2023-11-20

## 2023-11-20 RX ORDER — KETOROLAC TROMETHAMINE 10 MG/1
10 TABLET, FILM COATED ORAL EVERY 12 HOURS PRN
Qty: 20 TABLET | Refills: 0 | Status: SHIPPED | OUTPATIENT
Start: 2023-11-20 | End: 2023-12-20

## 2023-11-20 NOTE — TELEPHONE ENCOUNTER
Pt went to the dentist today and was told that she has an abscess tooth. They provided pt with an antibiotic but didn't know what pain relief to give her since she can not take narco. Pt called in and asked if Dr. Racquel Tan could call in Toradol for her to subside the pain until she has her teeth pulled on 12/5/23. [facility] [place]. Please advise.

## 2023-11-20 NOTE — TELEPHONE ENCOUNTER
Pharmacist called to check on the status of prescription QTY change, informed that still awaiting on physicians response

## 2024-01-24 ENCOUNTER — OFFICE VISIT (OUTPATIENT)
Dept: INTERNAL MEDICINE CLINIC | Age: 49
End: 2024-01-24
Payer: COMMERCIAL

## 2024-01-24 VITALS
HEART RATE: 92 BPM | WEIGHT: 172.2 LBS | DIASTOLIC BLOOD PRESSURE: 82 MMHG | SYSTOLIC BLOOD PRESSURE: 126 MMHG | OXYGEN SATURATION: 98 % | BODY MASS INDEX: 32.01 KG/M2

## 2024-01-24 DIAGNOSIS — E11.42 TYPE 2 DIABETES MELLITUS WITH DIABETIC POLYNEUROPATHY, WITHOUT LONG-TERM CURRENT USE OF INSULIN (HCC): ICD-10-CM

## 2024-01-24 DIAGNOSIS — F41.9 ANXIETY: ICD-10-CM

## 2024-01-24 DIAGNOSIS — M79.7 FIBROMYALGIA: ICD-10-CM

## 2024-01-24 DIAGNOSIS — E11.9 TYPE 2 DIABETES MELLITUS WITHOUT COMPLICATION, WITHOUT LONG-TERM CURRENT USE OF INSULIN (HCC): Primary | ICD-10-CM

## 2024-01-24 DIAGNOSIS — F17.200 CURRENT EVERY DAY SMOKER: ICD-10-CM

## 2024-01-24 DIAGNOSIS — K76.0 HEPATIC STEATOSIS: ICD-10-CM

## 2024-01-24 DIAGNOSIS — E78.2 MIXED HYPERLIPIDEMIA: ICD-10-CM

## 2024-01-24 DIAGNOSIS — K76.9 LIVER LESION: ICD-10-CM

## 2024-01-24 LAB — HBA1C MFR BLD: 8.1 %

## 2024-01-24 PROCEDURE — G8427 DOCREV CUR MEDS BY ELIG CLIN: HCPCS | Performed by: INTERNAL MEDICINE

## 2024-01-24 PROCEDURE — 4004F PT TOBACCO SCREEN RCVD TLK: CPT | Performed by: INTERNAL MEDICINE

## 2024-01-24 PROCEDURE — 3052F HG A1C>EQUAL 8.0%<EQUAL 9.0%: CPT | Performed by: INTERNAL MEDICINE

## 2024-01-24 PROCEDURE — G8484 FLU IMMUNIZE NO ADMIN: HCPCS | Performed by: INTERNAL MEDICINE

## 2024-01-24 PROCEDURE — 99214 OFFICE O/P EST MOD 30 MIN: CPT | Performed by: INTERNAL MEDICINE

## 2024-01-24 PROCEDURE — G8417 CALC BMI ABV UP PARAM F/U: HCPCS | Performed by: INTERNAL MEDICINE

## 2024-01-24 PROCEDURE — 2022F DILAT RTA XM EVC RTNOPTHY: CPT | Performed by: INTERNAL MEDICINE

## 2024-01-24 PROCEDURE — 83036 HEMOGLOBIN GLYCOSYLATED A1C: CPT | Performed by: INTERNAL MEDICINE

## 2024-01-24 RX ORDER — ACYCLOVIR 400 MG/1
1 TABLET ORAL SEE ADMIN INSTRUCTIONS
Qty: 1 EACH | Refills: 0 | Status: SHIPPED | OUTPATIENT
Start: 2024-01-24

## 2024-01-24 RX ORDER — ACYCLOVIR 400 MG/1
1 TABLET ORAL
Qty: 3 EACH | Refills: 2 | Status: SHIPPED | OUTPATIENT
Start: 2024-01-24

## 2024-01-24 RX ORDER — GLIPIZIDE 2.5 MG/1
2.5 TABLET, EXTENDED RELEASE ORAL DAILY
Qty: 30 TABLET | Refills: 3 | Status: SHIPPED | OUTPATIENT
Start: 2024-01-24

## 2024-01-24 RX ORDER — PROCHLORPERAZINE 25 MG/1
SUPPOSITORY RECTAL
Qty: 2 EACH | Refills: 3 | Status: SHIPPED | OUTPATIENT
Start: 2024-01-24

## 2024-01-24 RX ORDER — PIOGLITAZONEHYDROCHLORIDE 15 MG/1
15 TABLET ORAL DAILY
Qty: 30 TABLET | Refills: 3 | Status: SHIPPED | OUTPATIENT
Start: 2024-01-24

## 2024-01-24 ASSESSMENT — PATIENT HEALTH QUESTIONNAIRE - PHQ9
10. IF YOU CHECKED OFF ANY PROBLEMS, HOW DIFFICULT HAVE THESE PROBLEMS MADE IT FOR YOU TO DO YOUR WORK, TAKE CARE OF THINGS AT HOME, OR GET ALONG WITH OTHER PEOPLE: 0
7. TROUBLE CONCENTRATING ON THINGS, SUCH AS READING THE NEWSPAPER OR WATCHING TELEVISION: 0
3. TROUBLE FALLING OR STAYING ASLEEP: 0
5. POOR APPETITE OR OVEREATING: 0
SUM OF ALL RESPONSES TO PHQ QUESTIONS 1-9: 0
SUM OF ALL RESPONSES TO PHQ QUESTIONS 1-9: 0
9. THOUGHTS THAT YOU WOULD BE BETTER OFF DEAD, OR OF HURTING YOURSELF: 0
2. FEELING DOWN, DEPRESSED OR HOPELESS: 0
8. MOVING OR SPEAKING SO SLOWLY THAT OTHER PEOPLE COULD HAVE NOTICED. OR THE OPPOSITE, BEING SO FIGETY OR RESTLESS THAT YOU HAVE BEEN MOVING AROUND A LOT MORE THAN USUAL: 0
SUM OF ALL RESPONSES TO PHQ9 QUESTIONS 1 & 2: 0
6. FEELING BAD ABOUT YOURSELF - OR THAT YOU ARE A FAILURE OR HAVE LET YOURSELF OR YOUR FAMILY DOWN: 0
SUM OF ALL RESPONSES TO PHQ QUESTIONS 1-9: 0
SUM OF ALL RESPONSES TO PHQ QUESTIONS 1-9: 0
4. FEELING TIRED OR HAVING LITTLE ENERGY: 0

## 2024-01-24 NOTE — PROGRESS NOTES
[facility]  [address] [address]  Dept: [phone]  Dept Fax: [phone]    Office Progress/Follow Up Note  Date of patient's visit: 1/24/2024  Patient's Name:  Chantal Pond YOB: 1975            Patient Care Team:  Augusta Fleming MD as PCP - General (Internal Medicine)  Augusta Fleming MD as PCP - Empaneled Provider    REASON FOR VISIT: Routine outpatient follow up/Same day visit/Post hospital/ED visit    HISTORY OF PRESENT ILLNESS:      Chief Complaint   Patient presents with    Referral - General     Discuss Referrals   Patient would like a referral to a endocrinologist dr alma rosa Alex to a dietitian at [facility]         History was obtained from the patient. Chantal Pond is a 48 y.o. is here for a follow-up, patient's medical comorbidities including diabetes, hyperlipidemia, GERD,    Not taking blood sugars at home, requesting a glucometer, treatment as well, advised diet and exercise,  Glipizide added, HbA1c 8.6    Patient complaining of severe hot flashes, going on for many months, wants to be started on medication, allergic to multiple antidepressants SSRI, stated that she has taken SSRI in the past with seem to help, described, was told if notices any side effects she should stop, patient voiced understand    Also complaining of rash on feet and ankles,    Questionable liver lesion , patient was seen by GI, was last seen a year ago,        CT was ordered but patient did not get it done, will order advised to follow-up with GI     8/8  Numbness and swelling in hand s  Going on from last many yrs, now getting worse, has tried gabapentin in the past did not tolerate it, was taking Cymbalta and Lyrica, does not remember what happened but it was stopped, said the Lyrica was likely not covered by insurance and Cymbalta made her too groggy  Swelling of the  proximal  interphalangeal joints    Complaining of morning stiffness  Also has

## 2024-01-24 NOTE — PROGRESS NOTES
Visit Information    Have you changed or started any medications since your last visit including any over-the-counter medicines, vitamins, or herbal medicines? no   Are you having any side effects from any of your medications? -  no  Have you stopped taking any of your medications? Is so, why? -  no    Have you seen any other physician or provider since your last visit? No  Have you had any other diagnostic tests since your last visit? No  Have you been seen in the emergency room and/or had an admission to a hospital since we last saw you? No  Have you had your routine dental cleaning in the past 6 months? no    Have you activated your [employer] account? If not, what are your barriers? Yes     Patient Care Team:  Augusta Fleming MD as PCP - General (Internal Medicine)  Augusta Fleming MD as PCP - Empaneled Provider    Medical History Review  Past Medical, Family, and Social History reviewed and does not contribute to the patient presenting condition    Health Maintenance   Topic Date Due    Hepatitis B vaccine (1 of 3 - 3-dose series) Never done    COVID-19 Vaccine (1) Never done    Pneumococcal 0-64 years Vaccine (1 - PCV) Never done    Diabetic foot exam  Never done    DTaP/Tdap/Td vaccine (1 - Tdap) Never done    Breast cancer screen  Never done    Diabetic Alb to Cr ratio (uACR) test  05/18/2022    Lipids  05/18/2022    Flu vaccine (1) Never done    Depression Monitoring  02/09/2024    GFR test (Diabetes, CKD 3-4, OR last GFR 15-59)  08/08/2024    A1C test (Diabetic or Prediabetic)  10/17/2024    Diabetic retinal exam  01/17/2025    Colorectal Cancer Screen  08/29/2026    Hepatitis C screen  Completed    HIV screen  Completed    Hepatitis A vaccine  Aged Out    Hib vaccine  Aged Out    Polio vaccine  Aged Out    Meningococcal (ACWY) vaccine  Aged Out

## 2024-02-08 ENCOUNTER — TELEPHONE (OUTPATIENT)
Dept: INTERNAL MEDICINE CLINIC | Age: 49
End: 2024-02-08

## 2024-02-08 DIAGNOSIS — G90.59 COMPLEX REGIONAL PAIN SYNDROME TYPE 1 AFFECTING OTHER SITE: ICD-10-CM

## 2024-02-08 DIAGNOSIS — M54.16 LUMBAR RADICULOPATHY: ICD-10-CM

## 2024-02-08 DIAGNOSIS — R21 RASH: ICD-10-CM

## 2024-02-08 DIAGNOSIS — E11.9 TYPE 2 DIABETES MELLITUS WITHOUT COMPLICATION, WITHOUT LONG-TERM CURRENT USE OF INSULIN (HCC): Primary | ICD-10-CM

## 2024-02-08 NOTE — TELEPHONE ENCOUNTER
Patient called informing that she was just in the office on 1/24 and she thought that Dr Fleming was going refer her to Dermatology a Dietician and a Neurologist , please advise

## 2024-03-13 ENCOUNTER — HOSPITAL ENCOUNTER (OUTPATIENT)
Dept: NUTRITION | Age: 49
Discharge: HOME | End: 2024-03-13
Payer: COMMERCIAL

## 2024-03-13 VITALS — HEIGHT: 62 IN | BODY MASS INDEX: 32.01 KG/M2

## 2024-03-13 PROCEDURE — 97802 MEDICAL NUTRITION INDIV IN: CPT

## 2024-03-13 NOTE — PROGRESS NOTES
OUTPATIENT NUTRITION COUNSELING       Chantal Pond is a 48 y.o.  female     Reason for Counseling: Diabetes    NUTRITION RECOMMENDATIONS / MONITORING / EVALUATION  1.  Distribute carbohydrate fairly evenly throughout the day with inclusion of meal or snack within 2 hrs of waking.   2.  Create a list of foods permitted (based on allergies and what has previously been advised by physicians) and well tolerated to assist in meal planning.  3.  Name and Office phone number given for reference.    Subjective/Current Data:  Pt states she is allergic to foods including turkey, bananas, coconut, pineapple, and avocados. Can only tolerate tomatoes if sugar is added and although told she was allergic to egg whites, she seems to be able to tolerate small amounts. Goes to homeopathic doctors and has been told to avoid most dairy, gluten, and table sugar; was given a short list of acceptable foods. Uses raw sugar. Has received past diet education regarding the diabetic diet, and is most concerned about how to expand food options with multiple restrictions. Pt states she has been told she has a micro biome imbalance, intestinal permeability/ leaky gut. She experiences issues with frequent bloating. States constipation has improved with probiotics.     Objective Data:    Past Medical History:  Past Medical History:   Diagnosis Date    Abdominal pain     Antinuclear factor positive     Anxiety 01/16/2020    Arthritis     osteoarthritis    Autoimmune disease (HCC)     unknown exactly, being worked up    Class 2 severe obesity due to excess calories with serious comorbidity and body mass index (BMI) of 36.0 to 36.9 in adult (HCC) 07/27/2020    Depression     Diabetes mellitus (HCC)     Edema     Environmental allergies     Epigastric pain     Fibromyalgia     Gastritis     Gastro-esophageal reflux disease without esophagitis 10/06/2017    Hepatic steatosis     Liver lesion     Lumbago     Other seasonal allergic rhinitis 10/06/2017

## 2024-05-01 DIAGNOSIS — E78.2 MIXED HYPERLIPIDEMIA: ICD-10-CM

## 2024-05-01 DIAGNOSIS — K21.9 GASTRO-ESOPHAGEAL REFLUX DISEASE WITHOUT ESOPHAGITIS: ICD-10-CM

## 2024-05-01 DIAGNOSIS — E11.9 TYPE 2 DIABETES MELLITUS WITHOUT COMPLICATION, WITHOUT LONG-TERM CURRENT USE OF INSULIN (HCC): ICD-10-CM

## 2024-05-01 RX ORDER — OMEPRAZOLE 20 MG/1
CAPSULE, DELAYED RELEASE ORAL
Qty: 90 CAPSULE | Refills: 2 | Status: SHIPPED | OUTPATIENT
Start: 2024-05-01

## 2024-05-01 RX ORDER — PRAVASTATIN SODIUM 20 MG
20 TABLET ORAL DAILY
Qty: 90 TABLET | Refills: 1 | Status: SHIPPED | OUTPATIENT
Start: 2024-05-01

## 2024-05-02 ENCOUNTER — OFFICE VISIT (OUTPATIENT)
Dept: NEUROLOGY | Age: 49
End: 2024-05-02
Payer: COMMERCIAL

## 2024-05-02 VITALS
DIASTOLIC BLOOD PRESSURE: 89 MMHG | BODY MASS INDEX: 33.42 KG/M2 | HEART RATE: 91 BPM | HEIGHT: 61 IN | WEIGHT: 177 LBS | SYSTOLIC BLOOD PRESSURE: 147 MMHG

## 2024-05-02 DIAGNOSIS — M79.18 DIFFUSE MYOFASCIAL PAIN SYNDROME: Primary | ICD-10-CM

## 2024-05-02 DIAGNOSIS — M54.40 LOW BACK PAIN WITH SCIATICA, SCIATICA LATERALITY UNSPECIFIED, UNSPECIFIED BACK PAIN LATERALITY, UNSPECIFIED CHRONICITY: ICD-10-CM

## 2024-05-02 DIAGNOSIS — R20.0 BILATERAL LEG NUMBNESS: ICD-10-CM

## 2024-05-02 DIAGNOSIS — R20.2 PARESTHESIAS: ICD-10-CM

## 2024-05-02 PROCEDURE — G8427 DOCREV CUR MEDS BY ELIG CLIN: HCPCS | Performed by: PSYCHIATRY & NEUROLOGY

## 2024-05-02 PROCEDURE — 99205 OFFICE O/P NEW HI 60 MIN: CPT | Performed by: PSYCHIATRY & NEUROLOGY

## 2024-05-02 PROCEDURE — 4004F PT TOBACCO SCREEN RCVD TLK: CPT | Performed by: PSYCHIATRY & NEUROLOGY

## 2024-05-02 PROCEDURE — G8417 CALC BMI ABV UP PARAM F/U: HCPCS | Performed by: PSYCHIATRY & NEUROLOGY

## 2024-05-02 RX ORDER — COLLAGEN, HYDROLYSATE (BOVINE) 100 %
POWDER (GRAM) MISCELLANEOUS
COMMUNITY

## 2024-05-02 NOTE — PROGRESS NOTES
on 10/17/2023)      Fexofenadine HCl (ALLEGRA PO) Take by mouth daily (Patient not taking: Reported on 10/17/2023)      blood glucose monitor strips Test 2-3 times a day & as needed for symptoms of irregular blood glucose.  BRAND OF CHOICE INSURANCE ALLOWS. 100 strip 11    Blood Glucose Monitoring Suppl (BLOOD GLUCOSE MONITOR SYSTEM) w/Device KIT Use as directed.  Check glucose levels tid and as needed 1 kit 0    Alcohol Swabs (ALCOHOL PREP) PADS Use as directed 100 each 11    Lancets MISC 1 each by Does not apply route 2 times daily 300 each 11    chlorhexidine (PERIDEX) 0.12 % solution TAKE 15 MLS BY MOUTH 2 TIMES DAILY (Patient not taking: Reported on 1/24/2024) 473 mL 2    Cholecalciferol (VITAMIN D3) 1.25 MG (64095 UT) CAPS Take by mouth (Patient not taking: Reported on 10/17/2023)      Handicap Placard MISC by Does not apply route Expires December 2025 1 each 0     No current facility-administered medications for this visit.       Allergies   Allergen Reactions    Morphine Swelling    Penicillins Swelling    Amoxicillin     Hydrocodone-Acetaminophen     Oxycodone-Acetaminophen     Percocet [Oxycodone-Acetaminophen]     Trazodone Hcl Other (See Comments)    Ultram [Tramadol]     Vicodin [Hydrocodone-Acetaminophen]          Review of Systems     Vitals:    05/02/24 1411   BP: (!) 147/89   Pulse: 91     weight: 80.3 kg (177 lb)      Review of Systems   Constitutional: Negative.    HENT: Negative.     Eyes: Negative.    Respiratory: Negative.     Cardiovascular: Negative.    Gastrointestinal: Negative.    Endocrine: Negative.    Genitourinary: Negative.    Musculoskeletal:  Positive for arthralgias, back pain and myalgias.   Skin: Negative.    Allergic/Immunologic: Negative.    Neurological:  Positive for numbness.   Hematological: Negative.    Psychiatric/Behavioral: Negative.          Neurological Examination  Constitutional .General exam well groomed   Head/Ears /Nose/Throat: external ear .Normal exam  Neck and

## 2024-07-02 NOTE — PROGRESS NOTES
Ultram [Tramadol]     Vicodin [Hydrocodone-Acetaminophen]        SOCIAL HISTORY:  Social History     Tobacco Use    Smoking status: Every Day     Current packs/day: 0.50     Average packs/day: 0.5 packs/day for 27.0 years (13.5 ttl pk-yrs)     Types: Cigarettes    Smokeless tobacco: Never   Substance Use Topics    Alcohol use: No       Pertinent ROS:  Review of Systems  Skin: Denies any new changing, growing or bleeding lesions or rashes except as described in the HPI   Constitutional: Denies fevers, chills, and malaise.    PHYSICAL EXAM:   BP (!) 154/83   Pulse 86   Temp 99.1 °F (37.3 °C)   Ht 1.549 m (5' 1\")   Wt 78.5 kg (173 lb)   SpO2 99%   BMI 32.69 kg/m²     The patient is generally well appearing, well nourished, alert and conversational. Affect is normal.    Cutaneous Exam:  Physical Exam  Focused exam of bilateral feet, legs, and arms was performed    Facial covering was removed during examination.    Diagnoses/exam findings/medical history pertinent to this visit are listed below:    Assessment:   Diagnosis Orders   1. Dermatitis, unspecified             Plan:  Very subtle petechial rash of ankles, \"salt and pepper\" appearance  Ddx includes capillaritis (favor), exercise induced vasculitis, immunologic LCV  - would like to see the rash when it is active   - advised her to send photo over MyChart at next flare, can overbook if needed    Photosensitive rash  Ddx includes PMLE, SCLE  - not present on exam today   - advised her to send photo over MyChart at next flare   - with her family history of lupus and ?possible history of sjogrens (all recent HECTOR and SSA negative), we discussed that this could be related and I would like to biopsy this at next flare     RTC she will call at next flare    Future Appointments   Date Time Provider Department Center   7/10/2024  2:30 PM [facility] DIAG [facility]   7/16/2024  8:30 AM [facility] RM 1 [place] [facility] [place]   7/16/2024  9:30 AM [facility] MRI

## 2024-07-03 ENCOUNTER — TELEPHONE (OUTPATIENT)
Dept: INTERNAL MEDICINE CLINIC | Age: 49
End: 2024-07-03

## 2024-07-03 DIAGNOSIS — M65.311 TRIGGER FINGER OF RIGHT THUMB: Primary | ICD-10-CM

## 2024-07-03 NOTE — TELEPHONE ENCOUNTER
Patient called and stated she is having pain in her right thumb. She believes its trigger finger. She would like to know if she can get a splint for it. She is scheduled to see PCP for 7/23/24. Please advise

## 2024-07-09 ENCOUNTER — OFFICE VISIT (OUTPATIENT)
Dept: ORTHOPEDIC SURGERY | Age: 49
End: 2024-07-09
Payer: COMMERCIAL

## 2024-07-09 VITALS — WEIGHT: 177 LBS | HEIGHT: 61 IN | BODY MASS INDEX: 33.42 KG/M2 | RESPIRATION RATE: 14 BRPM

## 2024-07-09 DIAGNOSIS — M65.311 TRIGGER FINGER OF RIGHT THUMB: Primary | ICD-10-CM

## 2024-07-09 PROCEDURE — G8417 CALC BMI ABV UP PARAM F/U: HCPCS | Performed by: PHYSICIAN ASSISTANT

## 2024-07-09 PROCEDURE — G8427 DOCREV CUR MEDS BY ELIG CLIN: HCPCS | Performed by: PHYSICIAN ASSISTANT

## 2024-07-09 PROCEDURE — 99203 OFFICE O/P NEW LOW 30 MIN: CPT | Performed by: PHYSICIAN ASSISTANT

## 2024-07-09 PROCEDURE — 4004F PT TOBACCO SCREEN RCVD TLK: CPT | Performed by: PHYSICIAN ASSISTANT

## 2024-07-09 NOTE — PROGRESS NOTES
[facility]ICINE  [address] [address]  [address] [place], [place] [id number]  Dept: [phone]    Ambulatory Orthopedic New Patient Visit      CHIEF COMPLAINT:    Chief Complaint   Patient presents with    Follow-Up from Hospital     Right hand pain       HISTORY OF PRESENT ILLNESS:      Chantal oPnd  is a Right dominant female with a  1 year history of generalized right hand pain.   The patient denies numbness and tingling to the fingers. Tiggering of the right thumb finger is  noted.   The patient's normal sleep patterns are affected.  Pain is made worse with gripping or grabbing with the right hand.     The patient has not had a previous corticosteroid injection.  The patient has not had previous physical therapy for this problem.  The patient has tried oral NSAIDs for this problem previously.   She takes naproxen for her discomfort. She notes that she does wear compression gloves intermittently, which does slightly help her hand pain.    The patient denies a history of rheumatoid arthritis, but notes that she is being worked up for autoimmune conditions.    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      The patient does have diabetes - with a last documented HgbA1c of 8.1 as of 1/24/2024.        Past Medical History:    Past Medical History:   Diagnosis Date    Abdominal pain     Antinuclear factor positive     Anxiety 01/16/2020    Arthritis     osteoarthritis    Autoimmune disease (HCC)     unknown exactly, being worked up    Class 2 severe obesity due to excess calories with serious comorbidity and body mass index (BMI) of 36.0 to 36.9 in adult (HCC) 07/27/2020    Depression     Diabetes mellitus (HCC)     Edema     Environmental allergies     Epigastric pain     Fibromyalgia     Gastritis     Gastro-esophageal reflux disease without esophagitis 10/06/2017    Hepatic steatosis

## 2024-07-10 ENCOUNTER — HOSPITAL ENCOUNTER (OUTPATIENT)
Dept: WOMENS IMAGING | Age: 49
Discharge: HOME | End: 2024-07-12
Payer: COMMERCIAL

## 2024-07-10 ENCOUNTER — OFFICE VISIT (OUTPATIENT)
Dept: DERMATOLOGY | Age: 49
End: 2024-07-10
Payer: COMMERCIAL

## 2024-07-10 VITALS
WEIGHT: 173 LBS | OXYGEN SATURATION: 99 % | HEART RATE: 86 BPM | TEMPERATURE: 99.1 F | DIASTOLIC BLOOD PRESSURE: 83 MMHG | HEIGHT: 61 IN | SYSTOLIC BLOOD PRESSURE: 154 MMHG | BODY MASS INDEX: 32.66 KG/M2

## 2024-07-10 DIAGNOSIS — Z12.31 ENCOUNTER FOR SCREENING MAMMOGRAM FOR BREAST CANCER: ICD-10-CM

## 2024-07-10 DIAGNOSIS — L30.9 DERMATITIS, UNSPECIFIED: Primary | ICD-10-CM

## 2024-07-10 DIAGNOSIS — L56.8 PHOTOSENSITIVE CONTACT DERMATITIS: ICD-10-CM

## 2024-07-10 PROCEDURE — G8417 CALC BMI ABV UP PARAM F/U: HCPCS | Performed by: DERMATOLOGY

## 2024-07-10 PROCEDURE — 4004F PT TOBACCO SCREEN RCVD TLK: CPT | Performed by: DERMATOLOGY

## 2024-07-10 PROCEDURE — G8427 DOCREV CUR MEDS BY ELIG CLIN: HCPCS | Performed by: DERMATOLOGY

## 2024-07-10 PROCEDURE — 77063 BREAST TOMOSYNTHESIS BI: CPT

## 2024-07-10 PROCEDURE — 99203 OFFICE O/P NEW LOW 30 MIN: CPT | Performed by: DERMATOLOGY

## 2024-07-10 NOTE — PATIENT INSTRUCTIONS
- would like to see the rashes when active   - advised her to send photo over MyChart at next flare

## 2024-07-12 ENCOUNTER — TELEPHONE (OUTPATIENT)
Dept: INTERNAL MEDICINE CLINIC | Age: 49
End: 2024-07-12

## 2024-07-12 DIAGNOSIS — N64.89 BREAST ASYMMETRY: Primary | ICD-10-CM

## 2024-07-12 ASSESSMENT — ENCOUNTER SYMPTOMS
COUGH: 0
SHORTNESS OF BREATH: 0
VOMITING: 0
COLOR CHANGE: 0

## 2024-07-12 NOTE — TELEPHONE ENCOUNTER
Patient called for results of recent testing. MAMMOGRAM  Please review all labs and/or testing ordered.       Please advise

## 2024-07-16 ENCOUNTER — HOSPITAL ENCOUNTER (OUTPATIENT)
Dept: MRI IMAGING | Facility: CLINIC | Age: 49
Discharge: HOME | End: 2024-07-18
Payer: COMMERCIAL

## 2024-07-16 ENCOUNTER — HOSPITAL ENCOUNTER (OUTPATIENT)
Age: 49
Setting detail: SPECIMEN
Discharge: HOME | End: 2024-07-16

## 2024-07-16 DIAGNOSIS — M54.40 LOW BACK PAIN WITH SCIATICA, SCIATICA LATERALITY UNSPECIFIED, UNSPECIFIED BACK PAIN LATERALITY, UNSPECIFIED CHRONICITY: ICD-10-CM

## 2024-07-16 DIAGNOSIS — R20.2 PARESTHESIAS: ICD-10-CM

## 2024-07-16 LAB — CREAT BLD-MCNC: 0.9 MG/DL (ref 0.6–1.4)

## 2024-07-16 PROCEDURE — A9579 GAD-BASE MR CONTRAST NOS,1ML: HCPCS | Performed by: PSYCHIATRY & NEUROLOGY

## 2024-07-16 PROCEDURE — 6360000004 HC RX CONTRAST MEDICATION: Performed by: PSYCHIATRY & NEUROLOGY

## 2024-07-16 PROCEDURE — 72148 MRI LUMBAR SPINE W/O DYE: CPT

## 2024-07-16 PROCEDURE — 70553 MRI BRAIN STEM W/O & W/DYE: CPT

## 2024-07-16 RX ORDER — SODIUM CHLORIDE 0.9 % (FLUSH) 0.9 %
10 SYRINGE (ML) INJECTION PRN
Status: DISCONTINUED | OUTPATIENT
Start: 2024-07-16 | End: 2024-07-19 | Stop reason: HOSPADM

## 2024-07-16 RX ADMIN — GADOTERIDOL 18 ML: 279.3 INJECTION, SOLUTION INTRAVENOUS at 10:49

## 2024-07-18 ENCOUNTER — TELEPHONE (OUTPATIENT)
Dept: NEUROLOGY | Age: 49
End: 2024-07-18

## 2024-07-19 ENCOUNTER — TELEPHONE (OUTPATIENT)
Dept: NEUROLOGY | Age: 49
End: 2024-07-19

## 2024-07-23 ENCOUNTER — OFFICE VISIT (OUTPATIENT)
Dept: INTERNAL MEDICINE CLINIC | Age: 49
End: 2024-07-23
Payer: COMMERCIAL

## 2024-07-23 ENCOUNTER — TELEPHONE (OUTPATIENT)
Dept: INTERNAL MEDICINE CLINIC | Age: 49
End: 2024-07-23

## 2024-07-23 VITALS
SYSTOLIC BLOOD PRESSURE: 134 MMHG | DIASTOLIC BLOOD PRESSURE: 78 MMHG | HEIGHT: 61 IN | HEART RATE: 94 BPM | OXYGEN SATURATION: 98 % | BODY MASS INDEX: 32.47 KG/M2 | WEIGHT: 172 LBS

## 2024-07-23 DIAGNOSIS — E11.9 TYPE 2 DIABETES MELLITUS WITHOUT COMPLICATION, WITHOUT LONG-TERM CURRENT USE OF INSULIN (HCC): ICD-10-CM

## 2024-07-23 DIAGNOSIS — G90.59 COMPLEX REGIONAL PAIN SYNDROME TYPE 1 AFFECTING OTHER SITE: ICD-10-CM

## 2024-07-23 DIAGNOSIS — K21.9 GASTRO-ESOPHAGEAL REFLUX DISEASE WITHOUT ESOPHAGITIS: ICD-10-CM

## 2024-07-23 DIAGNOSIS — M54.16 LUMBAR RADICULOPATHY: ICD-10-CM

## 2024-07-23 DIAGNOSIS — D32.9 MENINGIOMA (HCC): ICD-10-CM

## 2024-07-23 DIAGNOSIS — E78.2 MIXED HYPERLIPIDEMIA: ICD-10-CM

## 2024-07-23 DIAGNOSIS — E11.42 TYPE 2 DIABETES MELLITUS WITH DIABETIC POLYNEUROPATHY, WITHOUT LONG-TERM CURRENT USE OF INSULIN (HCC): Primary | ICD-10-CM

## 2024-07-23 DIAGNOSIS — D32.9 MENINGIOMA (HCC): Primary | ICD-10-CM

## 2024-07-23 PROCEDURE — G8427 DOCREV CUR MEDS BY ELIG CLIN: HCPCS | Performed by: INTERNAL MEDICINE

## 2024-07-23 PROCEDURE — 4004F PT TOBACCO SCREEN RCVD TLK: CPT | Performed by: INTERNAL MEDICINE

## 2024-07-23 PROCEDURE — 3052F HG A1C>EQUAL 8.0%<EQUAL 9.0%: CPT | Performed by: INTERNAL MEDICINE

## 2024-07-23 PROCEDURE — 2022F DILAT RTA XM EVC RTNOPTHY: CPT | Performed by: INTERNAL MEDICINE

## 2024-07-23 PROCEDURE — G8417 CALC BMI ABV UP PARAM F/U: HCPCS | Performed by: INTERNAL MEDICINE

## 2024-07-23 PROCEDURE — 99214 OFFICE O/P EST MOD 30 MIN: CPT | Performed by: INTERNAL MEDICINE

## 2024-07-23 RX ORDER — PRAVASTATIN SODIUM 20 MG
20 TABLET ORAL DAILY
Qty: 90 TABLET | Refills: 1 | Status: SHIPPED | OUTPATIENT
Start: 2024-07-23

## 2024-07-23 RX ORDER — PIOGLITAZONEHYDROCHLORIDE 15 MG/1
15 TABLET ORAL DAILY
Qty: 30 TABLET | Refills: 3 | Status: SHIPPED | OUTPATIENT
Start: 2024-07-23

## 2024-07-23 RX ORDER — ALBUTEROL SULFATE 90 UG/1
2 AEROSOL, METERED RESPIRATORY (INHALATION) 4 TIMES DAILY PRN
Qty: 18 G | Refills: 5 | Status: SHIPPED | OUTPATIENT
Start: 2024-07-23

## 2024-07-23 RX ORDER — GLIPIZIDE 2.5 MG/1
2.5 TABLET, EXTENDED RELEASE ORAL DAILY
Qty: 30 TABLET | Refills: 3 | Status: SHIPPED | OUTPATIENT
Start: 2024-07-23

## 2024-07-23 RX ORDER — OMEPRAZOLE 20 MG/1
CAPSULE, DELAYED RELEASE ORAL
Qty: 90 CAPSULE | Refills: 2 | Status: SHIPPED | OUTPATIENT
Start: 2024-07-23

## 2024-07-23 SDOH — ECONOMIC STABILITY: FOOD INSECURITY: WITHIN THE PAST 12 MONTHS, THE FOOD YOU BOUGHT JUST DIDN'T LAST AND YOU DIDN'T HAVE MONEY TO GET MORE.: SOMETIMES TRUE

## 2024-07-23 SDOH — ECONOMIC STABILITY: TRANSPORTATION INSECURITY
IN THE PAST 12 MONTHS, HAS LACK OF TRANSPORTATION KEPT YOU FROM MEETINGS, WORK, OR FROM GETTING THINGS NEEDED FOR DAILY LIVING?: NO

## 2024-07-23 SDOH — ECONOMIC STABILITY: INCOME INSECURITY: HOW HARD IS IT FOR YOU TO PAY FOR THE VERY BASICS LIKE FOOD, HOUSING, MEDICAL CARE, AND HEATING?: NOT VERY HARD

## 2024-07-23 SDOH — ECONOMIC STABILITY: FOOD INSECURITY: WITHIN THE PAST 12 MONTHS, YOU WORRIED THAT YOUR FOOD WOULD RUN OUT BEFORE YOU GOT MONEY TO BUY MORE.: SOMETIMES TRUE

## 2024-07-23 NOTE — PROGRESS NOTES
Visit Information    Have you changed or started any medications since your last visit including any over-the-counter medicines, vitamins, or herbal medicines? no   Are you having any side effects from any of your medications? -  no  Have you stopped taking any of your medications? Is so, why? -  no    Have you seen any other physician or provider since your last visit? No  Have you had any other diagnostic tests since your last visit? No  Have you been seen in the emergency room and/or had an admission to a hospital since we last saw you? No  Have you had your routine dental cleaning in the past 6 months? no    Have you activated your "[employer]" account? If not, what are your barriers? Yes     Patient Care Team:  Augusta Fleming MD as PCP - General (Internal Medicine)  Augusta Fleming MD as PCP - Empaneled Provider    Medical History Review  Past Medical, Family, and Social History reviewed and does contribute to the patient presenting condition    Health Maintenance   Topic Date Due    Hepatitis B vaccine (1 of 3 - 3-dose series) Never done    COVID-19 Vaccine (1) Never done    Pneumococcal 0-64 years Vaccine (1 of 2 - PCV) Never done    DTaP/Tdap/Td vaccine (1 - Tdap) Never done    Diabetic Alb to Cr ratio (uACR) test  05/18/2022    Lipids  05/18/2022    GFR test (Diabetes, CKD 3-4, OR last GFR 15-59)  08/08/2024    Flu vaccine (1) 08/01/2024    Diabetic retinal exam  01/17/2025    Diabetic foot exam  01/24/2025    A1C test (Diabetic or Prediabetic)  01/24/2025    Depression Monitoring  01/24/2025    Breast cancer screen  07/10/2026    Colorectal Cancer Screen  08/29/2026    Hepatitis C screen  Completed    HIV screen  Completed    Hepatitis A vaccine  Aged Out    Hib vaccine  Aged Out    Polio vaccine  Aged Out    Meningococcal (ACWY) vaccine  Aged Out       
mouth daily  Mixed hyperlipidemia  -     pravastatin (PRAVACHOL) 20 MG tablet; Take 1 tablet by mouth daily  Gastro-esophageal reflux disease without esophagitis  -     omeprazole (PRILOSEC) 20 MG delayed release capsule; TAKE 1 CAPSULE BY MOUTH ONCE DAILY AS NEEDED  Meningioma (HCC)  -     External Referral To Neurology  Other orders  -     albuterol sulfate HFA (VENTOLIN HFA) 108 (90 Base) MCG/ACT inhaler; Inhale 2 puffs into the lungs 4 times daily as needed for Wheezing     Tobacco abuse, strongly recommended to quit      FOLLOW UP AND INSTRUCTIONS:   No follow-ups on file.    Chantal received counseling on the following healthy behaviors: nutrition    Discussed use, benefit, and side effects of prescribed medications.  Barriers to medication compliance addressed.  All patient questions answered.  Pt voiced understanding.     Patient given educational materials - see patient instructions    Augusta Fleming MD  [facility]  7/23/2024, 3:22 PM    Please note that this chart was generated using voice recognition Dragon dictation software.  Although every effort was made to ensure the accuracy of this automatedtranscription, some errors in transcription may have occurred.

## 2024-07-23 NOTE — TELEPHONE ENCOUNTER
Message to nurse . Let patient know MRI of Head shows small left parietal meningioma benign tumor outside brain . MRI lumbar spine with only minor disc bulging L5-S1

## 2024-07-23 NOTE — TELEPHONE ENCOUNTER
Diagnosis code was not accepted at [facility] please sign pended order with corrected diagnosis code.

## 2024-07-26 ENCOUNTER — HOSPITAL ENCOUNTER (OUTPATIENT)
Dept: WOMENS IMAGING | Age: 49
End: 2024-07-26
Attending: INTERNAL MEDICINE
Payer: COMMERCIAL

## 2024-07-26 DIAGNOSIS — R92.8 ABNORMAL MAMMOGRAM: ICD-10-CM

## 2024-07-26 DIAGNOSIS — N64.89 BREAST ASYMMETRY: ICD-10-CM

## 2024-07-26 PROCEDURE — 76642 ULTRASOUND BREAST LIMITED: CPT

## 2024-07-26 PROCEDURE — G0279 TOMOSYNTHESIS, MAMMO: HCPCS

## 2024-08-29 ENCOUNTER — OFFICE VISIT (OUTPATIENT)
Dept: NEUROLOGY | Age: 49
End: 2024-08-29
Payer: COMMERCIAL

## 2024-08-29 VITALS
HEART RATE: 84 BPM | BODY MASS INDEX: 31.72 KG/M2 | OXYGEN SATURATION: 96 % | TEMPERATURE: 97.8 F | WEIGHT: 168 LBS | DIASTOLIC BLOOD PRESSURE: 84 MMHG | HEIGHT: 61 IN | SYSTOLIC BLOOD PRESSURE: 130 MMHG

## 2024-08-29 DIAGNOSIS — D32.9 MENINGIOMA (HCC): ICD-10-CM

## 2024-08-29 DIAGNOSIS — R20.2 PARESTHESIAS: ICD-10-CM

## 2024-08-29 DIAGNOSIS — M79.7 FIBROMYALGIA: Primary | ICD-10-CM

## 2024-08-29 PROCEDURE — 4004F PT TOBACCO SCREEN RCVD TLK: CPT | Performed by: PSYCHIATRY & NEUROLOGY

## 2024-08-29 PROCEDURE — G8417 CALC BMI ABV UP PARAM F/U: HCPCS | Performed by: PSYCHIATRY & NEUROLOGY

## 2024-08-29 PROCEDURE — 99214 OFFICE O/P EST MOD 30 MIN: CPT | Performed by: PSYCHIATRY & NEUROLOGY

## 2024-08-29 PROCEDURE — G8427 DOCREV CUR MEDS BY ELIG CLIN: HCPCS | Performed by: PSYCHIATRY & NEUROLOGY

## 2024-08-29 ASSESSMENT — ENCOUNTER SYMPTOMS
ALLERGIC/IMMUNOLOGIC NEGATIVE: 1
EYES NEGATIVE: 1
RESPIRATORY NEGATIVE: 1
BACK PAIN: 1
GASTROINTESTINAL NEGATIVE: 1

## 2024-08-29 NOTE — PROGRESS NOTES
Active problem myofacial pain syndrome although with cojoint arthritis there is suspicion of possible rheumatological condition . She is to undergo MRI of low back with low back pain  EMG of legs with foot numbness . She has concern of multiple sclerosis but feel less likely but will order MRI of Head . The condition is MRI of Head meningioma adjacent to high left frontal parietal region 1.4 x 1.3 cm . MRI lumbar spine L4-5 disc bulge . She continue seeing holistic doctor in [facility] who has her on L glutamine,   collagen powder , magnesium and probiotics which has helped with stomach problem with gastritis before when eating getting sick . She has been on neurontin having allegic reaction  . She went on lyrica making her nauseaous . She can not take elavil with concern from prior testing that she is intolerant . She has been on cycmbalta having had reaction . She has has tried PT and aquatic therapy . She is also seeing rheumatology at [facility].  49 yo lady with body pain. She reports she is here with help of nurse advocate for [employer] . She has gotten several diagnosis including fibromyalgia . She has diffuse body pain which has been there since she was 5 to 6 year old . She reports that her grandmother would tell her she would lay crying with pain .  Wisam reports pain in legs in anterior thighs and posterior calves.  There is ankle and knee pain . There is tingling numbness in her feet with with burning aching in soles of her feet that will fluctuate in intensity up to grade 6 over 10 . Pain in thighs and calves is intense feeling tight . There is pain in her arms , elbows , wrists , upper ams and foreams . There low back pain with lesser neck pain . There is no middle low back pain . Low back pain is therefor  the larger part in mid low back of sharp intensity grade 8 over 10 which may radiate down either leg right more than  left . There is joint pain in her hands awakening like her

## 2024-09-23 DIAGNOSIS — E11.42 TYPE 2 DIABETES MELLITUS WITH DIABETIC POLYNEUROPATHY, WITHOUT LONG-TERM CURRENT USE OF INSULIN (HCC): ICD-10-CM

## 2024-09-24 RX ORDER — LANCETS 30 GAUGE
1 EACH MISCELLANEOUS DAILY
Qty: 100 EACH | Refills: 11 | Status: SHIPPED | OUTPATIENT
Start: 2024-09-24

## 2024-09-24 RX ORDER — GLUCOSAMINE HCL/CHONDROITIN SU 500-400 MG
1 CAPSULE ORAL 4 TIMES DAILY
Qty: 100 STRIP | Refills: 11 | Status: SHIPPED | OUTPATIENT
Start: 2024-09-24

## 2024-09-26 ENCOUNTER — TELEPHONE (OUTPATIENT)
Dept: INTERNAL MEDICINE CLINIC | Age: 49
End: 2024-09-26

## 2024-09-26 NOTE — TELEPHONE ENCOUNTER
Patient called and would like to know if there is a form or letter stating she can not have the covid vaccine due to her allergies. Please advise.

## 2024-09-26 NOTE — TELEPHONE ENCOUNTER
Patient called stating that her last physician in 2022 Dr Rodriguez told her not to get it because she has so many allergies that she has to carry around 2 epi pens. Her allergy list is like 2 pages long.  Patient states she has never had one before.    Please advise if letter can be provided   No

## 2024-10-16 ENCOUNTER — TELEPHONE (OUTPATIENT)
Dept: NEUROLOGY | Age: 49
End: 2024-10-16

## 2024-10-16 NOTE — TELEPHONE ENCOUNTER
10 16 2024 I spoke with the patient regarding her follow up appointment and she stated that she has some things going on right now and when things settle down she will call the office back to schedule at that time.  KS

## 2024-12-20 ENCOUNTER — TELEPHONE (OUTPATIENT)
Dept: INTERNAL MEDICINE CLINIC | Age: 49
End: 2024-12-20

## 2024-12-30 ENCOUNTER — TELEPHONE (OUTPATIENT)
Dept: INTERNAL MEDICINE CLINIC | Age: 49
End: 2024-12-30

## 2024-12-30 NOTE — TELEPHONE ENCOUNTER
Patient states she has the Noro Virus she was vomiting yesterday  and had a bad headache.    Today she feels a little better and was able to eat a piece of toast.    She is wondering how long she will be contagious and how long until she can go back to work.    She has been drinking a lot of fluids. She wants to know if it is okay to take her diabetes medication. She has not had it since the day before yesterday. She has not checked her sugars since then.    Please advise

## 2025-02-18 ENCOUNTER — OFFICE VISIT (OUTPATIENT)
Dept: INTERNAL MEDICINE CLINIC | Age: 50
End: 2025-02-18
Payer: MEDICAID

## 2025-02-18 VITALS
WEIGHT: 173 LBS | DIASTOLIC BLOOD PRESSURE: 76 MMHG | SYSTOLIC BLOOD PRESSURE: 122 MMHG | BODY MASS INDEX: 32.66 KG/M2 | HEIGHT: 61 IN | OXYGEN SATURATION: 97 % | HEART RATE: 84 BPM

## 2025-02-18 DIAGNOSIS — M16.0 PRIMARY OSTEOARTHRITIS OF BOTH HIPS: ICD-10-CM

## 2025-02-18 DIAGNOSIS — E11.42 TYPE 2 DIABETES MELLITUS WITH DIABETIC POLYNEUROPATHY, WITHOUT LONG-TERM CURRENT USE OF INSULIN (HCC): Primary | ICD-10-CM

## 2025-02-18 DIAGNOSIS — E11.9 TYPE 2 DIABETES MELLITUS WITHOUT COMPLICATION, WITHOUT LONG-TERM CURRENT USE OF INSULIN (HCC): ICD-10-CM

## 2025-02-18 DIAGNOSIS — D32.9 MENINGIOMA (HCC): ICD-10-CM

## 2025-02-18 DIAGNOSIS — K76.0 HEPATIC STEATOSIS: ICD-10-CM

## 2025-02-18 LAB — HBA1C MFR BLD: 8.1 %

## 2025-02-18 PROCEDURE — 83036 HEMOGLOBIN GLYCOSYLATED A1C: CPT | Performed by: INTERNAL MEDICINE

## 2025-02-18 PROCEDURE — 3052F HG A1C>EQUAL 8.0%<EQUAL 9.0%: CPT | Performed by: INTERNAL MEDICINE

## 2025-02-18 PROCEDURE — 99214 OFFICE O/P EST MOD 30 MIN: CPT | Performed by: INTERNAL MEDICINE

## 2025-02-18 RX ORDER — GLIPIZIDE 2.5 MG/1
2.5 TABLET, EXTENDED RELEASE ORAL DAILY
Qty: 30 TABLET | Refills: 0 | Status: SHIPPED | OUTPATIENT
Start: 2025-02-18 | End: 2025-03-20

## 2025-02-18 RX ORDER — GLIPIZIDE 5 MG/1
5 TABLET, FILM COATED, EXTENDED RELEASE ORAL DAILY
Qty: 30 TABLET | Refills: 0 | Status: SHIPPED | OUTPATIENT
Start: 2025-02-18 | End: 2025-02-18

## 2025-02-18 RX ORDER — FEXOFENADINE HCL 180 MG/1
180 TABLET ORAL DAILY
Qty: 90 TABLET | Refills: 0 | Status: SHIPPED | OUTPATIENT
Start: 2025-02-18

## 2025-02-18 SDOH — ECONOMIC STABILITY: FOOD INSECURITY: WITHIN THE PAST 12 MONTHS, YOU WORRIED THAT YOUR FOOD WOULD RUN OUT BEFORE YOU GOT MONEY TO BUY MORE.: PATIENT DECLINED

## 2025-02-18 SDOH — ECONOMIC STABILITY: FOOD INSECURITY: WITHIN THE PAST 12 MONTHS, THE FOOD YOU BOUGHT JUST DIDN'T LAST AND YOU DIDN'T HAVE MONEY TO GET MORE.: PATIENT DECLINED

## 2025-02-18 ASSESSMENT — PATIENT HEALTH QUESTIONNAIRE - PHQ9
1. LITTLE INTEREST OR PLEASURE IN DOING THINGS: MORE THAN HALF THE DAYS
7. TROUBLE CONCENTRATING ON THINGS, SUCH AS READING THE NEWSPAPER OR WATCHING TELEVISION: NOT AT ALL
8. MOVING OR SPEAKING SO SLOWLY THAT OTHER PEOPLE COULD HAVE NOTICED. OR THE OPPOSITE, BEING SO FIGETY OR RESTLESS THAT YOU HAVE BEEN MOVING AROUND A LOT MORE THAN USUAL: NOT AT ALL
SUM OF ALL RESPONSES TO PHQ QUESTIONS 1-9: 10
SUM OF ALL RESPONSES TO PHQ QUESTIONS 1-9: 10
6. FEELING BAD ABOUT YOURSELF - OR THAT YOU ARE A FAILURE OR HAVE LET YOURSELF OR YOUR FAMILY DOWN: NOT AT ALL
2. FEELING DOWN, DEPRESSED OR HOPELESS: MORE THAN HALF THE DAYS
3. TROUBLE FALLING OR STAYING ASLEEP: NEARLY EVERY DAY
9. THOUGHTS THAT YOU WOULD BE BETTER OFF DEAD, OR OF HURTING YOURSELF: NOT AT ALL
SUM OF ALL RESPONSES TO PHQ9 QUESTIONS 1 & 2: 4
SUM OF ALL RESPONSES TO PHQ QUESTIONS 1-9: 10
SUM OF ALL RESPONSES TO PHQ QUESTIONS 1-9: 10
4. FEELING TIRED OR HAVING LITTLE ENERGY: MORE THAN HALF THE DAYS
10. IF YOU CHECKED OFF ANY PROBLEMS, HOW DIFFICULT HAVE THESE PROBLEMS MADE IT FOR YOU TO DO YOUR WORK, TAKE CARE OF THINGS AT HOME, OR GET ALONG WITH OTHER PEOPLE: SOMEWHAT DIFFICULT
5. POOR APPETITE OR OVEREATING: SEVERAL DAYS

## 2025-02-18 NOTE — PROGRESS NOTES
\"Have you been to the ER, urgent care clinic since your last visit?  Hospitalized since your last visit?\"    NO    “Have you seen or consulted any other health care providers outside our system since your last visit?”    Neuro 9/3      “Have you had a diabetic eye exam?”    NO     Date of last diabetic eye exam: 1/17/2024

## 2025-02-18 NOTE — PROGRESS NOTES
[facility]  [address] [address]  Dept: [phone]  Dept Fax: [phone]    Office Progress/Follow Up Note  Date of patient's visit: 2/18/2025  Patient's Name:  Chantal Pond YOB: 1975            Patient Care Team:  Augusta Fleming MD as PCP - General (Internal Medicine)  Augusta Fleming MD as PCP - Empaneled Provider    REASON FOR VISIT: Routine outpatient follow up/Same day visit/Post hospital/ED visit    HISTORY OF PRESENT ILLNESS:      Chief Complaint   Patient presents with    Diabetes     A1C- 1/24/24= 8.1        History was obtained from the patient. Chantal Pond is a 49 y.o. is here for a follow-up, patient's medical comorbidities including diabetes, hyperlipidemia, GERD,    DM2   8.1  STARTED TAKING MEDS 3 MONTHS BACK   WAS NOT TAKING EARLIER   DIDN'T WANT TO MAKE ANY CHANGES YET   HAD STRESSFUL LAST COUPLE OF MONTHS       Patient complaining of severe hot flashes, going on for many months, wants to be started on medication, allergic to multiple antidepressants SSRI, stated that she has taken SSRI in the past with seem to help, described, was told if notices any side effects she should stop, patient voiced understand    Questionable liver lesion , patient was seen by GI, was last seen a year ago,  WILL REFERRAL AGAIN         CT was ordered but patient did not get it done, will order advised to follow-up with GI     HIP PAIN , WILL DO XRAY   ALLERGIC TO MULTI PE MEDS   CAN'T TAKE TYLENOL   TAKING NAPROXEN AS NEEDED       meningoma   F/U NEUROLOGY AND NEUROSURGERY       Complaining of morning stiffness  Also has sjogren   EMG in 2014  consistent with carpal tunnel   HECTOR in 2021 was negaive   Has been several Rheuntologist in the past     Allergic to many medications,  F/U [facility]  RHEUM    POSSIBLE COMPLEX REGIONAL PAIN SYNDROME AS PER [facility]       F/U HOLISTIC PHYSICIAN AT [facility]       MAMMOGRAM IN 11/2024  WILL REPEAT IN 12 MONTHS     Patient

## 2025-05-15 ENCOUNTER — APPOINTMENT (OUTPATIENT)
Dept: GENERAL RADIOLOGY | Age: 50
End: 2025-05-15
Payer: MEDICAID

## 2025-05-15 ENCOUNTER — HOSPITAL ENCOUNTER (EMERGENCY)
Age: 50
Discharge: HOME | End: 2025-05-15
Attending: STUDENT IN AN ORGANIZED HEALTH CARE EDUCATION/TRAINING PROGRAM
Payer: MEDICAID

## 2025-05-15 VITALS
HEIGHT: 60 IN | RESPIRATION RATE: 18 BRPM | DIASTOLIC BLOOD PRESSURE: 86 MMHG | OXYGEN SATURATION: 99 % | HEART RATE: 91 BPM | WEIGHT: 169 LBS | BODY MASS INDEX: 33.18 KG/M2 | TEMPERATURE: 98.2 F | SYSTOLIC BLOOD PRESSURE: 150 MMHG

## 2025-05-15 DIAGNOSIS — G57.61 MORTON'S NEUROMA OF RIGHT FOOT: ICD-10-CM

## 2025-05-15 DIAGNOSIS — M79.671 RIGHT FOOT PAIN: Primary | ICD-10-CM

## 2025-05-15 PROCEDURE — 99283 EMERGENCY DEPT VISIT LOW MDM: CPT

## 2025-05-15 PROCEDURE — 73630 X-RAY EXAM OF FOOT: CPT

## 2025-05-15 ASSESSMENT — ENCOUNTER SYMPTOMS
RHINORRHEA: 0
ABDOMINAL PAIN: 0
SHORTNESS OF BREATH: 0
SORE THROAT: 0
VOMITING: 0
DIARRHEA: 0
NAUSEA: 0
EYE REDNESS: 0
EYE DISCHARGE: 0

## 2025-05-15 ASSESSMENT — PAIN - FUNCTIONAL ASSESSMENT: PAIN_FUNCTIONAL_ASSESSMENT: 0-10

## 2025-05-15 ASSESSMENT — PAIN SCALES - GENERAL: PAINLEVEL_OUTOF10: 8

## 2025-05-16 NOTE — ED PROVIDER NOTES
EMERGENCY DEPARTMENT ENCOUNTER    Pt Name: Chantal Pond  MRN: [id number]  Birthdate 1975  Date of evaluation: 5/15/25  CHIEF COMPLAINT       Chief Complaint   Patient presents with    Foot Pain     Right x10 days, reporting swelling and redness   Ambulatory, no notable injury     HISTORY OF PRESENT ILLNESS   49-year-old presenting with foot pain    Patient states there is some pain seems to be between the webspace of the 4th and 5th toes on the plantar surface of the foot occasional shooting pains going up the foot    No known trauma but does states she is developing some neuropathy so she is not sure if she could have done something new.  No redness warmth fevers chills drain              REVIEW OF SYSTEMS     Review of Systems   Constitutional:  Negative for chills and fever.   HENT:  Negative for rhinorrhea and sore throat.    Eyes:  Negative for discharge and redness.   Respiratory:  Negative for shortness of breath.    Cardiovascular:  Negative for chest pain.   Gastrointestinal:  Negative for abdominal pain, diarrhea, nausea and vomiting.   Genitourinary:  Negative for dysuria, frequency and urgency.   Musculoskeletal:  Negative for arthralgias and myalgias.        Foot pain   Skin:  Negative for rash.   Neurological:  Negative for weakness and numbness.   Psychiatric/Behavioral:  Negative for suicidal ideas.    All other systems reviewed and are negative.    PASTMEDICAL HISTORY     Past Medical History:   Diagnosis Date    Abdominal pain     Antinuclear factor positive     Anxiety 01/16/2020    Arthritis     osteoarthritis    Autoimmune disease     unknown exactly, being worked up    Class 2 severe obesity due to excess calories with serious comorbidity and body mass index (BMI) of 36.0 to 36.9 in adult (HCC) 07/27/2020    Depression     Diabetes mellitus ([facility])     Edema     Environmental allergies     Epigastric pain     Fibromyalgia     Gastritis     Gastro-esophageal reflux disease without esophagitis

## 2025-05-29 ENCOUNTER — OFFICE VISIT (OUTPATIENT)
Age: 50
End: 2025-05-29

## 2025-05-29 VITALS — WEIGHT: 169 LBS | BODY MASS INDEX: 31.91 KG/M2 | HEIGHT: 61 IN

## 2025-05-29 DIAGNOSIS — M79.671 PAIN IN RIGHT FOOT: ICD-10-CM

## 2025-05-29 DIAGNOSIS — G57.81 INTERDIGITAL NEUROMA OF RIGHT FOOT: Primary | ICD-10-CM

## 2025-05-29 NOTE — PROGRESS NOTES
Chantal Pond is a 49 y.o. female who presents to the office today with chief complaint of pain to the ball of the right foot.  Chief Complaint   Patient presents with    Foot Pain     Pain in the ball of the right foot for about 2 weeks     Other     Xrays in Epic     Diabetes     Blood sugar 160    Symptoms began about 2 week(s) ago. Patient denies injury to the right foot. Patient states that it feels as though something is under the foot. Pain is rated 9 out of 10 at it's worst and is described as intermittent. Treatments prior to today's visit include: Patient went to the ED and had xrays taken and was told that it is possibly a neuroma. Patient was not given any treatment, but referred to my office for care.      Allergies   Allergen Reactions    Morphine Swelling    Penicillins Swelling    Amoxicillin     Hydrocodone-Acetaminophen     Oxycodone-Acetaminophen     Percocet [Oxycodone-Acetaminophen]     Trazodone Hcl Other (See Comments)    Ultram [Tramadol]     Vicodin [Hydrocodone-Acetaminophen]        Past Medical History:   Diagnosis Date    Abdominal pain     Antinuclear factor positive     Anxiety 01/16/2020    Arthritis     osteoarthritis    Autoimmune disease     unknown exactly, being worked up    Class 2 severe obesity due to excess calories with serious comorbidity and body mass index (BMI) of 36.0 to 36.9 in adult (HCC) 07/27/2020    Depression     Diabetes mellitus ([facility])     Edema     Environmental allergies     Epigastric pain     Fibromyalgia     Gastritis     Gastro-esophageal reflux disease without esophagitis 10/06/2017    Hepatic steatosis     Liver lesion     Lumbago     Other seasonal allergic rhinitis 10/06/2017    Plantar fasciitis     PONV (postoperative nausea and vomiting)     Primary fibromyalgia syndrome     Prolonged emergence from general anesthesia     With Parotidectomy surgery and Shoulder Surgery    PTSD (post-traumatic stress disorder)     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

## 2025-06-24 NOTE — TELEPHONE ENCOUNTER
Please Approve or Refuse.   Send to Pharmacy per Pt's Request:      Next Visit Date:  10/6/2022   Last Visit Date: 6/1/2022    Hemoglobin A1C (%)   Date Value   06/01/2022 9.3   06/14/2021 8.7   03/18/2021 9.2 (H)             ( goal A1C is < 7)   BP Readings from Last 3 Encounters:   06/01/22 134/76   04/18/22 125/84   10/29/21 128/68          (goal 120/80)  BUN   Date Value Ref Range Status   05/18/2021 9 6 - 20 mg/dL Final     Creatinine   Date Value Ref Range Status   05/18/2021 0.88 0.50 - 0.90 mg/dL Final     Potassium   Date Value Ref Range Status   05/18/2021 4.4 3.7 - 5.3 mmol/L Final Sent zofran

## 2025-07-03 DIAGNOSIS — E11.9 TYPE 2 DIABETES MELLITUS WITHOUT COMPLICATION, WITHOUT LONG-TERM CURRENT USE OF INSULIN (HCC): ICD-10-CM

## 2025-07-03 RX ORDER — PIOGLITAZONE 15 MG/1
15 TABLET ORAL DAILY
Qty: 30 TABLET | Refills: 2 | Status: SHIPPED | OUTPATIENT
Start: 2025-07-03

## 2025-07-03 RX ORDER — GLIPIZIDE 2.5 MG/1
2.5 TABLET, EXTENDED RELEASE ORAL DAILY
Qty: 30 TABLET | Refills: 0 | Status: SHIPPED | OUTPATIENT
Start: 2025-07-03 | End: 2025-08-02

## 2025-07-10 DIAGNOSIS — B37.2 CANDIDAL INTERTRIGO: ICD-10-CM

## 2025-07-10 RX ORDER — LIDOCAINE 50 MG/G
1 PATCH TOPICAL DAILY PRN
Qty: 30 PATCH | Refills: 3 | Status: SHIPPED | OUTPATIENT
Start: 2025-07-10

## 2025-07-10 RX ORDER — NYSTATIN 100000 [USP'U]/G
POWDER TOPICAL
Qty: 1 EACH | Refills: 5 | Status: SHIPPED | OUTPATIENT
Start: 2025-07-10